# Patient Record
Sex: MALE | Race: WHITE | Employment: OTHER | URBAN - METROPOLITAN AREA
[De-identification: names, ages, dates, MRNs, and addresses within clinical notes are randomized per-mention and may not be internally consistent; named-entity substitution may affect disease eponyms.]

---

## 2017-01-11 PROCEDURE — 81003 URINALYSIS AUTO W/O SCOPE: CPT | Performed by: INTERNAL MEDICINE

## 2017-02-03 PROBLEM — K21.9 GASTROESOPHAGEAL REFLUX DISEASE WITHOUT ESOPHAGITIS: Status: ACTIVE | Noted: 2017-02-03

## 2017-02-03 PROBLEM — M54.9 UPPER BACK PAIN: Status: ACTIVE | Noted: 2017-02-03

## 2017-06-23 PROBLEM — E11.9 CONTROLLED TYPE 2 DIABETES MELLITUS WITHOUT COMPLICATION, WITHOUT LONG-TERM CURRENT USE OF INSULIN (HCC): Status: ACTIVE | Noted: 2017-06-23

## 2017-06-23 PROBLEM — I82.409 RECURRENT DEEP VEIN THROMBOSIS (DVT) (HCC): Status: ACTIVE | Noted: 2017-06-23

## 2017-06-23 PROBLEM — K21.9 GASTROESOPHAGEAL REFLUX DISEASE WITHOUT ESOPHAGITIS: Status: RESOLVED | Noted: 2017-02-03 | Resolved: 2017-06-23

## 2017-10-17 PROBLEM — M75.101 ROTATOR CUFF SYNDROME, RIGHT: Status: ACTIVE | Noted: 2017-10-17

## 2017-10-17 PROBLEM — M54.16 LUMBAR RADICULOPATHY: Status: ACTIVE | Noted: 2017-10-17

## 2017-10-23 PROBLEM — M75.102 ROTATOR CUFF SYNDROME OF BOTH SHOULDERS: Status: ACTIVE | Noted: 2017-10-17

## 2017-10-23 PROBLEM — M75.101 ROTATOR CUFF SYNDROME OF BOTH SHOULDERS: Status: ACTIVE | Noted: 2017-10-17

## 2017-10-24 PROBLEM — M48.062 NEUROGENIC CLAUDICATION DUE TO LUMBAR SPINAL STENOSIS: Status: ACTIVE | Noted: 2017-10-24

## 2017-10-24 PROBLEM — M54.42 CHRONIC BILATERAL LOW BACK PAIN WITH BILATERAL SCIATICA: Status: ACTIVE | Noted: 2017-10-24

## 2017-10-24 PROBLEM — M54.41 CHRONIC BILATERAL LOW BACK PAIN WITH BILATERAL SCIATICA: Status: ACTIVE | Noted: 2017-10-24

## 2017-10-24 PROBLEM — G89.29 CHRONIC BILATERAL LOW BACK PAIN WITH BILATERAL SCIATICA: Status: ACTIVE | Noted: 2017-10-24

## 2018-02-07 RX ORDER — SODIUM CHLORIDE, SODIUM LACTATE, POTASSIUM CHLORIDE, CALCIUM CHLORIDE 600; 310; 30; 20 MG/100ML; MG/100ML; MG/100ML; MG/100ML
INJECTION, SOLUTION INTRAVENOUS CONTINUOUS
Status: CANCELLED | OUTPATIENT
Start: 2018-02-07

## 2018-02-15 PROCEDURE — 81003 URINALYSIS AUTO W/O SCOPE: CPT | Performed by: INTERNAL MEDICINE

## 2018-02-26 PROBLEM — N18.30 TYPE 2 DIABETES MELLITUS WITH STAGE 3 CHRONIC KIDNEY DISEASE, WITHOUT LONG-TERM CURRENT USE OF INSULIN (HCC): Status: ACTIVE | Noted: 2017-06-23

## 2018-02-26 PROBLEM — E11.22 TYPE 2 DIABETES MELLITUS WITH STAGE 3 CHRONIC KIDNEY DISEASE, WITHOUT LONG-TERM CURRENT USE OF INSULIN (HCC): Status: ACTIVE | Noted: 2017-06-23

## 2018-02-26 PROCEDURE — 87081 CULTURE SCREEN ONLY: CPT | Performed by: INTERNAL MEDICINE

## 2018-03-02 ENCOUNTER — ANESTHESIA EVENT (OUTPATIENT)
Dept: SURGERY | Facility: HOSPITAL | Age: 73
DRG: 520 | End: 2018-03-02
Payer: MEDICARE

## 2018-03-15 ENCOUNTER — SURGERY (OUTPATIENT)
Age: 73
End: 2018-03-15

## 2018-03-15 ENCOUNTER — APPOINTMENT (OUTPATIENT)
Dept: GENERAL RADIOLOGY | Facility: HOSPITAL | Age: 73
DRG: 520 | End: 2018-03-15
Attending: ORTHOPAEDIC SURGERY
Payer: MEDICARE

## 2018-03-15 ENCOUNTER — ANESTHESIA (OUTPATIENT)
Dept: SURGERY | Facility: HOSPITAL | Age: 73
DRG: 520 | End: 2018-03-15
Payer: MEDICARE

## 2018-03-15 ENCOUNTER — HOSPITAL ENCOUNTER (INPATIENT)
Facility: HOSPITAL | Age: 73
LOS: 1 days | Discharge: HOME HEALTH | DRG: 520 | End: 2018-03-16
Attending: ORTHOPAEDIC SURGERY | Admitting: ORTHOPAEDIC SURGERY
Payer: MEDICARE

## 2018-03-15 DIAGNOSIS — M51.26 HNP (HERNIATED NUCLEUS PULPOSUS), LUMBAR: ICD-10-CM

## 2018-03-15 DIAGNOSIS — M48.062 SPINAL STENOSIS OF LUMBAR REGION WITH NEUROGENIC CLAUDICATION: ICD-10-CM

## 2018-03-15 LAB
ERYTHROCYTE [DISTWIDTH] IN BLOOD BY AUTOMATED COUNT: 14.3 % (ref 11.5–16)
EST. AVERAGE GLUCOSE BLD GHB EST-MCNC: 105 MG/DL (ref 68–126)
GLUCOSE BLD-MCNC: 125 MG/DL (ref 65–99)
GLUCOSE BLD-MCNC: 125 MG/DL (ref 65–99)
GLUCOSE BLD-MCNC: 162 MG/DL (ref 65–99)
GLUCOSE BLD-MCNC: 293 MG/DL (ref 65–99)
HBA1C MFR BLD HPLC: 5.3 % (ref ?–5.7)
HCT VFR BLD AUTO: 37.3 % (ref 37–53)
HGB BLD-MCNC: 13.5 G/DL (ref 13–17)
MCH RBC QN AUTO: 33.3 PG (ref 27–33.2)
MCHC RBC AUTO-ENTMCNC: 36.2 G/DL (ref 31–37)
MCV RBC AUTO: 92.1 FL (ref 80–99)
PLATELET # BLD AUTO: 80 10(3)UL (ref 150–450)
RBC # BLD AUTO: 4.05 X10(6)UL (ref 3.8–5.8)
RED CELL DISTRIBUTION WIDTH-SD: 48.3 FL (ref 35.1–46.3)
WBC # BLD AUTO: 5.6 X10(3) UL (ref 4–13)

## 2018-03-15 PROCEDURE — 82962 GLUCOSE BLOOD TEST: CPT

## 2018-03-15 PROCEDURE — 01NB0ZZ RELEASE LUMBAR NERVE, OPEN APPROACH: ICD-10-PCS | Performed by: ORTHOPAEDIC SURGERY

## 2018-03-15 PROCEDURE — 88304 TISSUE EXAM BY PATHOLOGIST: CPT | Performed by: ORTHOPAEDIC SURGERY

## 2018-03-15 PROCEDURE — 88311 DECALCIFY TISSUE: CPT | Performed by: ORTHOPAEDIC SURGERY

## 2018-03-15 PROCEDURE — 72020 X-RAY EXAM OF SPINE 1 VIEW: CPT | Performed by: ORTHOPAEDIC SURGERY

## 2018-03-15 PROCEDURE — 85027 COMPLETE CBC AUTOMATED: CPT | Performed by: PHYSICIAN ASSISTANT

## 2018-03-15 PROCEDURE — 00BT0ZZ EXCISION OF SPINAL MENINGES, OPEN APPROACH: ICD-10-PCS | Performed by: ORTHOPAEDIC SURGERY

## 2018-03-15 PROCEDURE — 83036 HEMOGLOBIN GLYCOSYLATED A1C: CPT | Performed by: HOSPITALIST

## 2018-03-15 PROCEDURE — 51701 INSERT BLADDER CATHETER: CPT

## 2018-03-15 PROCEDURE — 0SU00KZ SUPPLEMENT LUMBAR VERTEBRAL JOINT WITH NONAUTOLOGOUS TISSUE SUBSTITUTE, OPEN APPROACH: ICD-10-PCS | Performed by: ORTHOPAEDIC SURGERY

## 2018-03-15 DEVICE — DURAGENXS MATRIX DURAL REGENERATION MATRIX IS AN ABSORBABLE IMPLANT FOR REPAIR OF DURAL DEFECTS. DURAGENXS MATRIX IS AN EASY TO HANDLE, SOFT, WHITE, PLIABLE, NONFRIABLE, POROUS COLLAGEN MATRIX. DURAGENXS MATRIX IS SUPPLIED STERILE, NONPYROGENIC, FOR SINGLE USE IN DOUBLE PEEL PACKAGES IN A VARIETY OF SIZES.
Type: IMPLANTABLE DEVICE | Site: BACK | Status: FUNCTIONAL
Brand: DURAGEN XS™ DURAL REGENERATION MATRIX

## 2018-03-15 RX ORDER — DIPHENHYDRAMINE HCL 25 MG
25 CAPSULE ORAL EVERY 4 HOURS PRN
Status: DISCONTINUED | OUTPATIENT
Start: 2018-03-15 | End: 2018-03-16

## 2018-03-15 RX ORDER — DEXAMETHASONE SODIUM PHOSPHATE 4 MG/ML
4 VIAL (ML) INJECTION AS NEEDED
Status: DISCONTINUED | OUTPATIENT
Start: 2018-03-15 | End: 2018-03-15 | Stop reason: HOSPADM

## 2018-03-15 RX ORDER — ASPIRIN 81 MG/1
81 TABLET ORAL DAILY
COMMUNITY
End: 2018-03-16

## 2018-03-15 RX ORDER — ACETAMINOPHEN 500 MG
1000 TABLET ORAL ONCE
COMMUNITY
End: 2018-05-07 | Stop reason: CLARIF

## 2018-03-15 RX ORDER — DEXTROSE MONOHYDRATE 25 G/50ML
50 INJECTION, SOLUTION INTRAVENOUS
Status: DISCONTINUED | OUTPATIENT
Start: 2018-03-15 | End: 2018-03-15 | Stop reason: HOSPADM

## 2018-03-15 RX ORDER — ONDANSETRON 2 MG/ML
4 INJECTION INTRAMUSCULAR; INTRAVENOUS AS NEEDED
Status: DISCONTINUED | OUTPATIENT
Start: 2018-03-15 | End: 2018-03-15 | Stop reason: HOSPADM

## 2018-03-15 RX ORDER — LOSARTAN POTASSIUM AND HYDROCHLOROTHIAZIDE 12.5; 1 MG/1; MG/1
1 TABLET ORAL DAILY
COMMUNITY
End: 2019-01-23

## 2018-03-15 RX ORDER — CEFAZOLIN SODIUM 1 G/3ML
INJECTION, POWDER, FOR SOLUTION INTRAMUSCULAR; INTRAVENOUS
Status: DISCONTINUED | OUTPATIENT
Start: 2018-03-15 | End: 2018-03-15

## 2018-03-15 RX ORDER — ALLOPURINOL 100 MG/1
100 TABLET ORAL
Status: DISCONTINUED | OUTPATIENT
Start: 2018-03-15 | End: 2018-03-16

## 2018-03-15 RX ORDER — TIZANIDINE 4 MG/1
4 TABLET ORAL EVERY 6 HOURS PRN
Status: DISCONTINUED | OUTPATIENT
Start: 2018-03-15 | End: 2018-03-16

## 2018-03-15 RX ORDER — CEFAZOLIN SODIUM/WATER 2 G/20 ML
SYRINGE (ML) INTRAVENOUS
Status: DISPENSED
Start: 2018-03-15 | End: 2018-03-15

## 2018-03-15 RX ORDER — GABAPENTIN 600 MG/1
600 TABLET ORAL ONCE
Status: COMPLETED | OUTPATIENT
Start: 2018-03-15 | End: 2018-03-15

## 2018-03-15 RX ORDER — SODIUM CHLORIDE, SODIUM LACTATE, POTASSIUM CHLORIDE, CALCIUM CHLORIDE 600; 310; 30; 20 MG/100ML; MG/100ML; MG/100ML; MG/100ML
INJECTION, SOLUTION INTRAVENOUS CONTINUOUS
Status: DISCONTINUED | OUTPATIENT
Start: 2018-03-15 | End: 2018-03-15 | Stop reason: HOSPADM

## 2018-03-15 RX ORDER — LOSARTAN POTASSIUM 100 MG/1
100 TABLET ORAL DAILY
Status: DISCONTINUED | OUTPATIENT
Start: 2018-03-15 | End: 2018-03-16

## 2018-03-15 RX ORDER — HYDROCODONE BITARTRATE AND ACETAMINOPHEN 5; 325 MG/1; MG/1
2 TABLET ORAL AS NEEDED
Status: DISCONTINUED | OUTPATIENT
Start: 2018-03-15 | End: 2018-03-15 | Stop reason: HOSPADM

## 2018-03-15 RX ORDER — METOCLOPRAMIDE HYDROCHLORIDE 5 MG/ML
10 INJECTION INTRAMUSCULAR; INTRAVENOUS EVERY 6 HOURS PRN
Status: DISCONTINUED | OUTPATIENT
Start: 2018-03-15 | End: 2018-03-16

## 2018-03-15 RX ORDER — ONDANSETRON 2 MG/ML
4 INJECTION INTRAMUSCULAR; INTRAVENOUS EVERY 4 HOURS PRN
Status: ACTIVE | OUTPATIENT
Start: 2018-03-15 | End: 2018-03-16

## 2018-03-15 RX ORDER — CELECOXIB 200 MG/1
CAPSULE ORAL
Status: COMPLETED
Start: 2018-03-15 | End: 2018-03-15

## 2018-03-15 RX ORDER — BISACODYL 10 MG
10 SUPPOSITORY, RECTAL RECTAL
Status: DISCONTINUED | OUTPATIENT
Start: 2018-03-15 | End: 2018-03-16

## 2018-03-15 RX ORDER — HYDROCODONE BITARTRATE AND ACETAMINOPHEN 5; 325 MG/1; MG/1
1 TABLET ORAL AS NEEDED
Status: DISCONTINUED | OUTPATIENT
Start: 2018-03-15 | End: 2018-03-15 | Stop reason: HOSPADM

## 2018-03-15 RX ORDER — GABAPENTIN 600 MG/1
TABLET ORAL
Status: COMPLETED
Start: 2018-03-15 | End: 2018-03-15

## 2018-03-15 RX ORDER — MORPHINE SULFATE 4 MG/ML
2 INJECTION, SOLUTION INTRAMUSCULAR; INTRAVENOUS EVERY 2 HOUR PRN
Status: DISCONTINUED | OUTPATIENT
Start: 2018-03-15 | End: 2018-03-16

## 2018-03-15 RX ORDER — SODIUM CHLORIDE 9 MG/ML
INJECTION, SOLUTION INTRAVENOUS CONTINUOUS
Status: DISCONTINUED | OUTPATIENT
Start: 2018-03-15 | End: 2018-03-16

## 2018-03-15 RX ORDER — LABETALOL HYDROCHLORIDE 5 MG/ML
5 INJECTION, SOLUTION INTRAVENOUS EVERY 5 MIN PRN
Status: DISCONTINUED | OUTPATIENT
Start: 2018-03-15 | End: 2018-03-15 | Stop reason: HOSPADM

## 2018-03-15 RX ORDER — ONDANSETRON 2 MG/ML
4 INJECTION INTRAMUSCULAR; INTRAVENOUS ONCE
Status: COMPLETED | OUTPATIENT
Start: 2018-03-15 | End: 2018-03-15

## 2018-03-15 RX ORDER — ONDANSETRON 2 MG/ML
INJECTION INTRAMUSCULAR; INTRAVENOUS
Status: COMPLETED
Start: 2018-03-15 | End: 2018-03-15

## 2018-03-15 RX ORDER — CEFAZOLIN SODIUM/WATER 2 G/20 ML
2 SYRINGE (ML) INTRAVENOUS EVERY 8 HOURS
Status: COMPLETED | OUTPATIENT
Start: 2018-03-15 | End: 2018-03-16

## 2018-03-15 RX ORDER — INSULIN ASPART 100 [IU]/ML
INJECTION, SOLUTION INTRAVENOUS; SUBCUTANEOUS ONCE
Status: DISCONTINUED | OUTPATIENT
Start: 2018-03-15 | End: 2018-03-15 | Stop reason: HOSPADM

## 2018-03-15 RX ORDER — ACETAMINOPHEN 500 MG
1000 TABLET ORAL ONCE AS NEEDED
Status: DISCONTINUED | OUTPATIENT
Start: 2018-03-15 | End: 2018-03-15 | Stop reason: HOSPADM

## 2018-03-15 RX ORDER — SODIUM CHLORIDE, SODIUM LACTATE, POTASSIUM CHLORIDE, CALCIUM CHLORIDE 600; 310; 30; 20 MG/100ML; MG/100ML; MG/100ML; MG/100ML
INJECTION, SOLUTION INTRAVENOUS CONTINUOUS
Status: DISCONTINUED | OUTPATIENT
Start: 2018-03-15 | End: 2018-03-16

## 2018-03-15 RX ORDER — AMLODIPINE BESYLATE 5 MG/1
5 TABLET ORAL DAILY
Status: DISCONTINUED | OUTPATIENT
Start: 2018-03-15 | End: 2018-03-16

## 2018-03-15 RX ORDER — GLIMEPIRIDE 2 MG/1
2 TABLET ORAL 2 TIMES DAILY
COMMUNITY
End: 2018-12-04

## 2018-03-15 RX ORDER — CEFAZOLIN SODIUM/WATER 2 G/20 ML
2 SYRINGE (ML) INTRAVENOUS ONCE
Status: DISCONTINUED | OUTPATIENT
Start: 2018-03-15 | End: 2018-03-15 | Stop reason: HOSPADM

## 2018-03-15 RX ORDER — DOCUSATE SODIUM 100 MG/1
100 CAPSULE, LIQUID FILLED ORAL 2 TIMES DAILY
Status: DISCONTINUED | OUTPATIENT
Start: 2018-03-15 | End: 2018-03-16

## 2018-03-15 RX ORDER — BUPIVACAINE HYDROCHLORIDE AND EPINEPHRINE 5; 5 MG/ML; UG/ML
INJECTION, SOLUTION EPIDURAL; INTRACAUDAL; PERINEURAL AS NEEDED
Status: DISCONTINUED | OUTPATIENT
Start: 2018-03-15 | End: 2018-03-15

## 2018-03-15 RX ORDER — DEXTROSE MONOHYDRATE 25 G/50ML
50 INJECTION, SOLUTION INTRAVENOUS
Status: DISCONTINUED | OUTPATIENT
Start: 2018-03-15 | End: 2018-03-16

## 2018-03-15 RX ORDER — MORPHINE SULFATE 2 MG/ML
2 INJECTION, SOLUTION INTRAMUSCULAR; INTRAVENOUS EVERY 5 MIN PRN
Status: DISCONTINUED | OUTPATIENT
Start: 2018-03-15 | End: 2018-03-15 | Stop reason: HOSPADM

## 2018-03-15 RX ORDER — SODIUM PHOSPHATE, DIBASIC AND SODIUM PHOSPHATE, MONOBASIC 7; 19 G/133ML; G/133ML
1 ENEMA RECTAL ONCE AS NEEDED
Status: DISCONTINUED | OUTPATIENT
Start: 2018-03-15 | End: 2018-03-16

## 2018-03-15 RX ORDER — MIDAZOLAM HYDROCHLORIDE 1 MG/ML
1 INJECTION INTRAMUSCULAR; INTRAVENOUS EVERY 5 MIN PRN
Status: DISCONTINUED | OUTPATIENT
Start: 2018-03-15 | End: 2018-03-15 | Stop reason: HOSPADM

## 2018-03-15 RX ORDER — NALOXONE HYDROCHLORIDE 0.4 MG/ML
80 INJECTION, SOLUTION INTRAMUSCULAR; INTRAVENOUS; SUBCUTANEOUS AS NEEDED
Status: DISCONTINUED | OUTPATIENT
Start: 2018-03-15 | End: 2018-03-15 | Stop reason: HOSPADM

## 2018-03-15 RX ORDER — MORPHINE SULFATE 15 MG/1
15 TABLET ORAL EVERY 4 HOURS PRN
Status: DISCONTINUED | OUTPATIENT
Start: 2018-03-15 | End: 2018-03-16

## 2018-03-15 RX ORDER — METOCLOPRAMIDE HYDROCHLORIDE 5 MG/ML
10 INJECTION INTRAMUSCULAR; INTRAVENOUS AS NEEDED
Status: DISCONTINUED | OUTPATIENT
Start: 2018-03-15 | End: 2018-03-15 | Stop reason: HOSPADM

## 2018-03-15 RX ORDER — DIPHENHYDRAMINE HYDROCHLORIDE 50 MG/ML
25 INJECTION INTRAMUSCULAR; INTRAVENOUS EVERY 4 HOURS PRN
Status: DISCONTINUED | OUTPATIENT
Start: 2018-03-15 | End: 2018-03-16

## 2018-03-15 RX ORDER — MEPERIDINE HYDROCHLORIDE 25 MG/ML
12.5 INJECTION INTRAMUSCULAR; INTRAVENOUS; SUBCUTANEOUS AS NEEDED
Status: DISCONTINUED | OUTPATIENT
Start: 2018-03-15 | End: 2018-03-15 | Stop reason: HOSPADM

## 2018-03-15 RX ORDER — POLYETHYLENE GLYCOL 3350 17 G/17G
17 POWDER, FOR SOLUTION ORAL DAILY PRN
Status: DISCONTINUED | OUTPATIENT
Start: 2018-03-15 | End: 2018-03-16

## 2018-03-15 RX ORDER — CELECOXIB 200 MG/1
200 CAPSULE ORAL ONCE
Status: COMPLETED | OUTPATIENT
Start: 2018-03-15 | End: 2018-03-15

## 2018-03-15 RX ORDER — OXYCODONE HYDROCHLORIDE 10 MG/1
10 TABLET ORAL EVERY 4 HOURS PRN
Status: DISCONTINUED | OUTPATIENT
Start: 2018-03-15 | End: 2018-03-16

## 2018-03-15 RX ORDER — MORPHINE SULFATE 4 MG/ML
4 INJECTION, SOLUTION INTRAMUSCULAR; INTRAVENOUS EVERY 2 HOUR PRN
Status: DISCONTINUED | OUTPATIENT
Start: 2018-03-15 | End: 2018-03-16

## 2018-03-15 RX ORDER — BACITRACIN 50000 [USP'U]/1
INJECTION, POWDER, LYOPHILIZED, FOR SOLUTION INTRAMUSCULAR AS NEEDED
Status: DISCONTINUED | OUTPATIENT
Start: 2018-03-15 | End: 2018-03-15

## 2018-03-15 RX ORDER — MORPHINE SULFATE 4 MG/ML
1 INJECTION, SOLUTION INTRAMUSCULAR; INTRAVENOUS EVERY 2 HOUR PRN
Status: DISCONTINUED | OUTPATIENT
Start: 2018-03-15 | End: 2018-03-16

## 2018-03-15 RX ORDER — OXYCODONE HYDROCHLORIDE 5 MG/1
5 TABLET ORAL EVERY 4 HOURS PRN
Status: DISCONTINUED | OUTPATIENT
Start: 2018-03-15 | End: 2018-03-16

## 2018-03-15 NOTE — ANESTHESIA POSTPROCEDURE EVALUATION
[address] [facility],[address] Patient Status:  Inpatient   Age/Gender 71year old male MRN [id number]   [facility] 3SW-A Attending Madisyn Martin MD   Hosp Day # 0 PCP Louie Crum MD       Anesthesia Post-op Note    Procedure(s):  lum

## 2018-03-15 NOTE — BRIEF OP NOTE
Pre-Operative Diagnosis: Spinal stenosis of lumbar region with neurogenic claudication [M48.062]  HNP (herniated nucleus pulposus), lumbar [M51.26]     Post-Operative Diagnosis: Spinal stenosis of lumbar region with neurogenic claudication [M48.062]HNP

## 2018-03-15 NOTE — PLAN OF CARE
Pt dressing leaking on L side. Reinforced dressing with foam tape twice. Paged Pool SALMERON, awaiting response. Will monitor. 1808: Discussed with Holly SALMERON.  If dressing leaks for 3rd time, change dressing and replace with 4x4s, abd pad, and foam t

## 2018-03-15 NOTE — PAYOR COMM NOTE
--------------  Order Requisition   Admit to inpatient Once (Order #[id number]) on 3/15/18        ADMISSION REVIEW     Payor: Hebert Stephen [place] #:  [id number]  Authorization Number: N/A    Admit date: 3/15/18  Admit time: 1014       Dire MD Shaheen      CeFAZolin Sodium (ANCEF) 1 g injection     Date Action Dose Route User    3/15/2018 0745 Given 2 g Intravenous eLslie Shanks MD      celecoxib (CeleBREX) cap 200 mg     Date Action Dose Route User    3/15/2018 7836 Given 200 mg Oral Labak

## 2018-03-15 NOTE — H&P
Uyen Bauer is a 71year old male. Patient presents with:  Pre-Op Exam: back surgery on 3/15/18 @ [facility] by Dr. Lisa Toledo      HPI:  He hAs had low back pain and will have surgery by Lisa Toledo. No hx of heart disease and had a stress test last year.   He ORDER FOR IV ANT*      Comment: Procedure: ESOPHAGOGASTRODUODENOSCOPY,                COLONOSCOPY, POSSIBLE BIOPSY, POSSIBLE                POLYPECTOMY 21845,84605;  Surgeon: Walter Silva MD;  Location: [address] [place] [address] date: T Rfl:       ROS:  GENERAL HEALTH: otherwise feels well  SKIN: denies any unusual skin lesions or rashes  EYES: no visual complaints or deficits  HEENT: denies nasal congestion or sore throat   RESPIRATORY: denies shortness of breath, wheezing or cough   CAR above referenced H&P was reviewed by Soledad Best MD on 3/15/2018, and no significant changes have occurred in the patient's condition since the H&P was performed. Risks and benefits discussed in detail.   Risks including but not limited to bleeding

## 2018-03-15 NOTE — ANESTHESIA PREPROCEDURE EVALUATION
PRE-OP EVALUATION    Patient Name: Karena Kennedy    Pre-op Diagnosis: Spinal stenosis of lumbar region with neurogenic claudication [M48.062]  HNP (herniated nucleus pulposus), lumbar [M51.26]    Procedure(s):  lumbar 3- lumbar 4, lumbar 4- lumbar 5 decompr daily.   Disp:  Rfl:    allopurinol 100 MG Oral Tab Take 1 tablet (100 mg total) by mouth once daily. Disp: 90 tablet Rfl: 3   Probiotic Product (ALIGN) 4 MG Oral Cap Take  by mouth. Disp:  Rfl:        Allergies: Patient has no known allergies.       Anesth POSSIBLE                POLYPECTOMY 37511,55677;  Surgeon: Amira Madrid MD;  Location: [address] [place] [address]  No date: TONSILLECTOMY  3/8/13= Gastritis: UPPER GI ENDOSCOPY PERFORMED      Comment: Normal gastric/duodenal Bx  3/5/2013: UP

## 2018-03-15 NOTE — PROGRESS NOTES
S: Minimal back pain with no leg pain. Patient has no numbness. No weakness. No headaches. No other concerns. Inspection:  Awake alert No acute distress.  No difficulty breathing     Blood pressure 150/77, pulse 80, temperature 97.6 °F (36.4 °C), t

## 2018-03-15 NOTE — PLAN OF CARE
Pt arrived from PACU on bed. A/Ox4. VSS. Denies pain or nausea. Due to void. Oriented to room and call light system. Educated on fall precautions. Paged Dr. Trent Foster for new consult. Will monitor. 1105: Dr. Trent Foster will see pt.

## 2018-03-15 NOTE — PLAN OF CARE
Pt reports that it feels like he is not emptying bladder completely. Bladder scan completed = 740cc. Straight cath'd per protocol = 750cc out. Will monitor.

## 2018-03-15 NOTE — CONSULTS
[facility] hospitalist initial consult note  PCP;Naldo Torre MD  Consulted at the request of Dr. Kay Ruth  Reason for consult medical co-management  HPi 72 yo male with hx of HTn, HL, DM2, gastritis, Spinal stenosis of lumbar region with neurogenic claudication Adria Fabry, MD;  Location: [address] [place] [address]  3/5/2013: [facility] PREOPERATIVE ORDER FOR IV ANT*      Comment: Procedure: ESOPHAGOGASTRODUODENOSCOPY,                COLONOSCOPY, POSSIBLE BIOPSY, POSSIBLE                POLYPECTOMY 4 POTASSIUM-HCTZ 100-12.5 MG Oral Tab TAKE 1 TABLET BY MOUTH  DAILY IN THE MORNING Disp: 90 tablet Rfl: 3   ACCU-CHEK FASTCLIX LANCETS Does not apply Misc Test once daily Disp: 100 each Rfl: 4   ACCU-CHEK SMARTVIEW In Vitro Strip Test once daily Disp: [address] st

## 2018-03-16 VITALS
TEMPERATURE: 98 F | SYSTOLIC BLOOD PRESSURE: 123 MMHG | RESPIRATION RATE: 20 BRPM | WEIGHT: 203 LBS | HEART RATE: 80 BPM | HEIGHT: 70 IN | OXYGEN SATURATION: 98 % | BODY MASS INDEX: 29.06 KG/M2 | DIASTOLIC BLOOD PRESSURE: 77 MMHG

## 2018-03-16 LAB
BASOPHILS # BLD AUTO: 0.01 X10(3) UL (ref 0–0.1)
BASOPHILS NFR BLD AUTO: 0.1 %
BUN BLD-MCNC: 14 MG/DL (ref 8–20)
CALCIUM BLD-MCNC: 8.9 MG/DL (ref 8.3–10.3)
CHLORIDE: 102 MMOL/L (ref 101–111)
CO2: 29 MMOL/L (ref 22–32)
CREAT BLD-MCNC: 1.35 MG/DL (ref 0.7–1.3)
EOSINOPHIL # BLD AUTO: 0.01 X10(3) UL (ref 0–0.3)
EOSINOPHIL NFR BLD AUTO: 0.1 %
ERYTHROCYTE [DISTWIDTH] IN BLOOD BY AUTOMATED COUNT: 14.3 % (ref 11.5–16)
GLUCOSE BLD-MCNC: 118 MG/DL (ref 65–99)
GLUCOSE BLD-MCNC: 119 MG/DL (ref 70–99)
GLUCOSE BLD-MCNC: 214 MG/DL (ref 65–99)
HCT VFR BLD AUTO: 36.4 % (ref 37–53)
HGB BLD-MCNC: 12.7 G/DL (ref 13–17)
IMMATURE GRANULOCYTE COUNT: 0.05 X10(3) UL (ref 0–1)
IMMATURE GRANULOCYTE RATIO %: 0.7 %
LYMPHOCYTES # BLD AUTO: 1.02 X10(3) UL (ref 0.9–4)
LYMPHOCYTES NFR BLD AUTO: 14.4 %
MCH RBC QN AUTO: 32.5 PG (ref 27–33.2)
MCHC RBC AUTO-ENTMCNC: 34.9 G/DL (ref 31–37)
MCV RBC AUTO: 93.1 FL (ref 80–99)
MONOCYTES # BLD AUTO: 0.66 X10(3) UL (ref 0.1–1)
MONOCYTES NFR BLD AUTO: 9.3 %
NEUTROPHIL ABS PRELIM: 5.31 X10 (3) UL (ref 1.3–6.7)
NEUTROPHILS # BLD AUTO: 5.31 X10(3) UL (ref 1.3–6.7)
NEUTROPHILS NFR BLD AUTO: 75.4 %
PLATELET # BLD AUTO: 92 10(3)UL (ref 150–450)
POTASSIUM SERPL-SCNC: 4.5 MMOL/L (ref 3.6–5.1)
RBC # BLD AUTO: 3.91 X10(6)UL (ref 3.8–5.8)
RED CELL DISTRIBUTION WIDTH-SD: 49 FL (ref 35.1–46.3)
SODIUM SERPL-SCNC: 137 MMOL/L (ref 136–144)
WBC # BLD AUTO: 7.1 X10(3) UL (ref 4–13)

## 2018-03-16 PROCEDURE — 85025 COMPLETE CBC W/AUTO DIFF WBC: CPT | Performed by: HOSPITALIST

## 2018-03-16 PROCEDURE — 97161 PT EVAL LOW COMPLEX 20 MIN: CPT

## 2018-03-16 PROCEDURE — 82962 GLUCOSE BLOOD TEST: CPT

## 2018-03-16 PROCEDURE — 80048 BASIC METABOLIC PNL TOTAL CA: CPT | Performed by: HOSPITALIST

## 2018-03-16 PROCEDURE — 97165 OT EVAL LOW COMPLEX 30 MIN: CPT

## 2018-03-16 PROCEDURE — 97530 THERAPEUTIC ACTIVITIES: CPT

## 2018-03-16 PROCEDURE — 97535 SELF CARE MNGMENT TRAINING: CPT

## 2018-03-16 RX ORDER — HYDROCODONE BITARTRATE AND ACETAMINOPHEN 10; 325 MG/1; MG/1
1 TABLET ORAL EVERY 6 HOURS PRN
Status: DISCONTINUED | OUTPATIENT
Start: 2018-03-16 | End: 2018-03-16

## 2018-03-16 NOTE — HOME CARE LIAISON
MET WITH PTNT TO DISCUSS HOME HEALTH SERVICES AND COVERAGE CRITERIA. PTNT AGREEABLE TO Andreas Espana. PTNT GIVEN RESIDENTIAL BROCHURE. RESIDENTIAL WITH PROVIDE SN/PT ON DISCHARGE.     Thank you for this referral,   Galindo Weaver

## 2018-03-16 NOTE — PLAN OF CARE
Pt reports that home health care will not be there to change dressing until Sunday- Per Kennedy SALMERON- every other day dressing changes should be done by home health care. Will change dressing prior to DC.    1615: Old dressing removed.  Still small amount of lakshmi

## 2018-03-16 NOTE — OCCUPATIONAL THERAPY NOTE
OCCUPATIONAL THERAPY QUICK EVALUATION - INPATIENT    Room Number: 380/380-A  Evaluation Date: 3/16/2018     Type of Evaluation: Quick Eval  Presenting Problem: s/p L3-L5 decompression fusion 3/15/18    Physician Order: IP Consult to Occupational Therapy  R DOCUMENTED NOT TO HAVE EXPERIENCED ANY*      Comment: Procedure: ESOPHAGOGASTRODUODENOSCOPY,                COLONOSCOPY, POSSIBLE BIOPSY, POSSIBLE                POLYPECTOMY 32152,61533;  Surgeon: Gloria Mcbride MD;  Location: [facility] techniques;Relaxation;Repositioning    COGNITION  WFL    RANGE OF MOTION AND STRENGTH ASSESSMENT  Upper extremity ROM is within functional limits     Upper extremity strength is within functional limits     NEUROLOGICAL FINDINGS  Neurological Findings: Non concerns addressed; Ice applied; Other (Comment) (refusing SCDs)    ASSESSMENT   Patient is a 71year old male admitted 3/15/2018 for L3-L5 decompression fusion 3/15/18. Patient seen this am for OT evauation.  In this OT eval patient performed all ADL tasks,

## 2018-03-16 NOTE — PHYSICAL THERAPY NOTE
PHYSICAL THERAPY QUICK EVALUATION - INPATIENT    Room Number: 380/380-A  Evaluation Date: 3/16/2018  Presenting Problem: S/p L3-L4,L4-L5 Decompression, Uninstrumented  Physician Order: PT Eval and Treat    Problem List  Active Problems:    HNP (herniated COLONOSCOPY, POSSIBLE BIOPSY, POSSIBLE                POLYPECTOMY 38162,78380;  Surgeon: Donya Edgar MD;  Location: [address] [place] [address]  No date: TONSILLECTOMY  3/8/13= Gastritis: UPPER GI ENDOSCOPY PERFORMED      Comment: Normal g left with end range limitation of ROM.     Lower extremity ROM is within functional limits     Lower extremity strength is within functional limits     NEUROLOGICAL FINDINGS  Neurological Findings: None                   AM-PAC '6-Clicks' INPATIENT SHORT FO year old male admitted on 3/15/2018 for S/p L3-L4,L4-L5 Decompression, Uninstrumented. Pertinent comorbidities and personal factors impacting therapy include H/o Right RC syndrome, HTN, DM type II,Gout.   Functional outcome measures completed include DEREK SALMERON

## 2018-03-16 NOTE — CERTIFICATION
**Certification    PHYSICIAN Certification of Need for Inpatient Hospitalization    Based on the his current state of illness, Opal Bradshaw requires inpatient hospitalization for his surgery.  Please see EPIC

## 2018-03-16 NOTE — PROGRESS NOTES
S: He has controlled back pain no leg pain. He had his dressing re-enforced    Inspection:  Awake alert No acute distress. No difficulty breathing     Blood pressure 136/73, pulse 70, temperature 98.6 °F (37 °C), resp.  rate 20, height 5' 10\" (1.778 m), w

## 2018-03-16 NOTE — PLAN OF CARE
Pt informs RN that he will be using [facility] for dressing changes and hiring [facility] staff to help change dressings on the alternate days. First visit will still be on Sunday but pt should be able to have daily dressing changes from then on.     Pt discharg

## 2018-03-16 NOTE — CM/SW NOTE
03/16/18 1600   CM/SW Referral Data   Referral Source Physician   Reason for Referral Discharge planning   Informant Patient;Edward Staff   Pertinent Medical Hx   Primary Care Physician Name KATHERIN Torre MD   Patient Info   Patient's Mental Status Aler

## 2018-03-16 NOTE — PLAN OF CARE
Diabetes/Glucose Control    • Glucose maintained within prescribed range Adequate for Discharge        Impaired Activities of Daily Living    • Achieve highest/safest level of independence in self care Adequate for Discharge        PAIN - ADULT    • Verbal

## 2018-03-16 NOTE — PROGRESS NOTES
[facility] hospitalist daily note  Patient was seen/examined on 3/16/18    S; no chest pain, no SOB, no abd pain, no nausea, patient feels ready for discharge.  I touched base with social  Work and they are coming to speak with the patient regarding health service

## 2018-03-19 NOTE — CM/SW NOTE
03/19/18 0800   Discharge disposition   Discharged to: Home-Health   Name of Facillity/Home Care/Hospice Residential   Discharge transportation Private car   3/16/18

## 2018-03-22 NOTE — OPERATIVE REPORT
[facility]    PATIENT'S NAME: Abhay Roldan   ATTENDING PHYSICIAN: Jenny Reynolds M.D. OPERATING PHYSICIAN: Jenny Reynolds M.D.    PATIENT ACCOUNT#:   [de-identified]    LOCATION:  [address] [place] [address]  MEDICAL RECORD #:   [id number]       DATE OF BIRTH:  [birth year] level of the fascia. Subperiosteal dissection was taken at the level of the facet joints but keeping the facet capsules entirely intact. Self-retaining retractors were applied.     We first began at the proximal level of our decompression and removed port sharp curettes, Kerrisons, and a pituitary. It was sent to Pathology for analysis. At the conclusion of the decompression, all areas were free of neurologic compression.     Attention then turned to the L4-L5 level where the cranial and caudal nerve roots irrigated away. Valsalva maneuver confirmed that there was no spinal leak. Marea Roland was applied, subfascial drain was applied.   The fascia was closed in water-tight fashion with figure-of-eight 1-0 Vicryl; 2-0 Vicryl was used for the subcutaneous tissues,

## 2018-04-23 PROBLEM — Z98.890 S/P LUMBAR LAMINECTOMY: Status: ACTIVE | Noted: 2018-04-23

## 2018-04-23 PROBLEM — Z98.1 S/P LUMBAR FUSION: Status: ACTIVE | Noted: 2018-04-23

## 2018-04-23 PROBLEM — M48.02 SPINAL STENOSIS IN CERVICAL REGION: Status: ACTIVE | Noted: 2018-04-23

## 2018-04-23 PROBLEM — M54.12 BRACHIAL (CERVICAL) NEURITIS: Status: ACTIVE | Noted: 2018-04-23

## 2018-05-07 RX ORDER — PREDNISONE 20 MG/1
20 TABLET ORAL NIGHTLY PRN
Status: ON HOLD | COMMUNITY
End: 2018-05-29

## 2018-05-12 PROCEDURE — 81003 URINALYSIS AUTO W/O SCOPE: CPT | Performed by: INTERNAL MEDICINE

## 2018-05-16 PROCEDURE — 87081 CULTURE SCREEN ONLY: CPT | Performed by: INTERNAL MEDICINE

## 2018-05-22 ENCOUNTER — APPOINTMENT (OUTPATIENT)
Dept: LAB | Facility: HOSPITAL | Age: 73
DRG: 472 | End: 2018-05-22
Payer: MEDICARE

## 2018-05-22 DIAGNOSIS — M48.02 SPINAL STENOSIS IN CERVICAL REGION: ICD-10-CM

## 2018-05-22 PROCEDURE — 93010 ELECTROCARDIOGRAM REPORT: CPT | Performed by: INTERNAL MEDICINE

## 2018-05-22 PROCEDURE — 93005 ELECTROCARDIOGRAM TRACING: CPT

## 2018-05-23 ENCOUNTER — ANESTHESIA EVENT (OUTPATIENT)
Dept: SURGERY | Facility: HOSPITAL | Age: 73
DRG: 472 | End: 2018-05-23
Payer: MEDICARE

## 2018-05-24 ENCOUNTER — HOSPITAL ENCOUNTER (INPATIENT)
Facility: HOSPITAL | Age: 73
LOS: 1 days | Discharge: HOME | DRG: 472 | End: 2018-05-25
Attending: ORTHOPAEDIC SURGERY | Admitting: ORTHOPAEDIC SURGERY
Payer: MEDICARE

## 2018-05-24 ENCOUNTER — ANESTHESIA (OUTPATIENT)
Dept: SURGERY | Facility: HOSPITAL | Age: 73
DRG: 472 | End: 2018-05-24
Payer: MEDICARE

## 2018-05-24 ENCOUNTER — APPOINTMENT (OUTPATIENT)
Dept: GENERAL RADIOLOGY | Facility: HOSPITAL | Age: 73
DRG: 472 | End: 2018-05-24
Attending: ORTHOPAEDIC SURGERY
Payer: MEDICARE

## 2018-05-24 ENCOUNTER — SURGERY (OUTPATIENT)
Age: 73
End: 2018-05-24

## 2018-05-24 DIAGNOSIS — M48.02 SPINAL STENOSIS IN CERVICAL REGION: Primary | ICD-10-CM

## 2018-05-24 DIAGNOSIS — M48.02 CERVICAL SPINAL STENOSIS: ICD-10-CM

## 2018-05-24 PROCEDURE — 4A11X4G MONITORING OF PERIPHERAL NERVOUS ELECTRICAL ACTIVITY, INTRAOPERATIVE, EXTERNAL APPROACH: ICD-10-PCS | Performed by: ORTHOPAEDIC SURGERY

## 2018-05-24 PROCEDURE — 95940 IONM IN OPERATNG ROOM 15 MIN: CPT | Performed by: ORTHOPAEDIC SURGERY

## 2018-05-24 PROCEDURE — 0RG20A0 FUSION OF 2 OR MORE CERVICAL VERTEBRAL JOINTS WITH INTERBODY FUSION DEVICE, ANTERIOR APPROACH, ANTERIOR COLUMN, OPEN APPROACH: ICD-10-PCS | Performed by: ORTHOPAEDIC SURGERY

## 2018-05-24 PROCEDURE — 82962 GLUCOSE BLOOD TEST: CPT

## 2018-05-24 PROCEDURE — 83036 HEMOGLOBIN GLYCOSYLATED A1C: CPT | Performed by: INTERNAL MEDICINE

## 2018-05-24 PROCEDURE — 95939 C MOTOR EVOKED UPR&LWR LIMBS: CPT | Performed by: ORTHOPAEDIC SURGERY

## 2018-05-24 PROCEDURE — 0RB30ZZ EXCISION OF CERVICAL VERTEBRAL DISC, OPEN APPROACH: ICD-10-PCS | Performed by: ORTHOPAEDIC SURGERY

## 2018-05-24 PROCEDURE — 00NW0ZZ RELEASE CERVICAL SPINAL CORD, OPEN APPROACH: ICD-10-PCS | Performed by: ORTHOPAEDIC SURGERY

## 2018-05-24 PROCEDURE — 76001 XR FLUOROSCOPE EXAM >1 HR EXTENSIVE (CPT=76001): CPT | Performed by: ORTHOPAEDIC SURGERY

## 2018-05-24 PROCEDURE — 88304 TISSUE EXAM BY PATHOLOGIST: CPT | Performed by: ORTHOPAEDIC SURGERY

## 2018-05-24 PROCEDURE — 95938 SOMATOSENSORY TESTING: CPT | Performed by: ORTHOPAEDIC SURGERY

## 2018-05-24 PROCEDURE — 00BT0ZZ EXCISION OF SPINAL MENINGES, OPEN APPROACH: ICD-10-PCS | Performed by: ORTHOPAEDIC SURGERY

## 2018-05-24 PROCEDURE — 85027 COMPLETE CBC AUTOMATED: CPT | Performed by: PHYSICIAN ASSISTANT

## 2018-05-24 PROCEDURE — 88311 DECALCIFY TISSUE: CPT | Performed by: ORTHOPAEDIC SURGERY

## 2018-05-24 PROCEDURE — 95861 NEEDLE EMG 2 EXTREMITIES: CPT | Performed by: ORTHOPAEDIC SURGERY

## 2018-05-24 DEVICE — ARCHON PLATE 42NN 2-LEVEL: Type: IMPLANTABLE DEVICE | Site: NECK | Status: FUNCTIONAL

## 2018-05-24 DEVICE — ARCHON SCRW 4.5X15 SLF-TP VAR: Type: IMPLANTABLE DEVICE | Site: NECK | Status: FUNCTIONAL

## 2018-05-24 DEVICE — COROENT ACR MRKR 8X17X14 10DEG: Type: IMPLANTABLE DEVICE | Site: NECK | Status: FUNCTIONAL

## 2018-05-24 DEVICE — ARCHON SCRW 4.0X15 SLF-TP VAR: Type: IMPLANTABLE DEVICE | Site: NECK | Status: FUNCTIONAL

## 2018-05-24 DEVICE — OSTEOCEL PRO BONE MATRIX SM: Type: IMPLANTABLE DEVICE | Site: NECK | Status: FUNCTIONAL

## 2018-05-24 RX ORDER — SODIUM CHLORIDE, SODIUM LACTATE, POTASSIUM CHLORIDE, CALCIUM CHLORIDE 600; 310; 30; 20 MG/100ML; MG/100ML; MG/100ML; MG/100ML
INJECTION, SOLUTION INTRAVENOUS CONTINUOUS
Status: DISCONTINUED | OUTPATIENT
Start: 2018-05-24 | End: 2018-05-24 | Stop reason: HOSPADM

## 2018-05-24 RX ORDER — BISACODYL 10 MG
10 SUPPOSITORY, RECTAL RECTAL
Status: DISCONTINUED | OUTPATIENT
Start: 2018-05-24 | End: 2018-05-25

## 2018-05-24 RX ORDER — ONDANSETRON 2 MG/ML
4 INJECTION INTRAMUSCULAR; INTRAVENOUS AS NEEDED
Status: DISCONTINUED | OUTPATIENT
Start: 2018-05-24 | End: 2018-05-24 | Stop reason: HOSPADM

## 2018-05-24 RX ORDER — DOCUSATE SODIUM 100 MG/1
100 CAPSULE, LIQUID FILLED ORAL 2 TIMES DAILY
Status: DISCONTINUED | OUTPATIENT
Start: 2018-05-24 | End: 2018-05-25

## 2018-05-24 RX ORDER — CEFAZOLIN SODIUM/WATER 2 G/20 ML
2 SYRINGE (ML) INTRAVENOUS ONCE
Status: COMPLETED | OUTPATIENT
Start: 2018-05-24 | End: 2018-05-24

## 2018-05-24 RX ORDER — OXYCODONE HYDROCHLORIDE 5 MG/1
5 TABLET ORAL EVERY 4 HOURS PRN
Status: DISCONTINUED | OUTPATIENT
Start: 2018-05-24 | End: 2018-05-25

## 2018-05-24 RX ORDER — MIDAZOLAM HYDROCHLORIDE 1 MG/ML
1 INJECTION INTRAMUSCULAR; INTRAVENOUS EVERY 5 MIN PRN
Status: DISCONTINUED | OUTPATIENT
Start: 2018-05-24 | End: 2018-05-24 | Stop reason: HOSPADM

## 2018-05-24 RX ORDER — HYDROCODONE BITARTRATE AND ACETAMINOPHEN 5; 325 MG/1; MG/1
1 TABLET ORAL AS NEEDED
Status: DISCONTINUED | OUTPATIENT
Start: 2018-05-24 | End: 2018-05-24 | Stop reason: HOSPADM

## 2018-05-24 RX ORDER — OXYCODONE HYDROCHLORIDE 10 MG/1
10 TABLET ORAL EVERY 4 HOURS PRN
Status: DISCONTINUED | OUTPATIENT
Start: 2018-05-24 | End: 2018-05-25

## 2018-05-24 RX ORDER — HYDROCODONE BITARTRATE AND ACETAMINOPHEN 5; 325 MG/1; MG/1
2 TABLET ORAL AS NEEDED
Status: DISCONTINUED | OUTPATIENT
Start: 2018-05-24 | End: 2018-05-24 | Stop reason: HOSPADM

## 2018-05-24 RX ORDER — DEXTROSE MONOHYDRATE 25 G/50ML
50 INJECTION, SOLUTION INTRAVENOUS
Status: DISCONTINUED | OUTPATIENT
Start: 2018-05-24 | End: 2018-05-24 | Stop reason: HOSPADM

## 2018-05-24 RX ORDER — MEPERIDINE HYDROCHLORIDE 25 MG/ML
12.5 INJECTION INTRAMUSCULAR; INTRAVENOUS; SUBCUTANEOUS AS NEEDED
Status: DISCONTINUED | OUTPATIENT
Start: 2018-05-24 | End: 2018-05-24 | Stop reason: HOSPADM

## 2018-05-24 RX ORDER — CEFAZOLIN SODIUM/WATER 2 G/20 ML
SYRINGE (ML) INTRAVENOUS
Status: DISPENSED
Start: 2018-05-24 | End: 2018-05-24

## 2018-05-24 RX ORDER — ONDANSETRON 2 MG/ML
4 INJECTION INTRAMUSCULAR; INTRAVENOUS EVERY 4 HOURS PRN
Status: DISCONTINUED | OUTPATIENT
Start: 2018-05-24 | End: 2018-05-25

## 2018-05-24 RX ORDER — GABAPENTIN 600 MG/1
TABLET ORAL
Status: COMPLETED
Start: 2018-05-24 | End: 2018-05-24

## 2018-05-24 RX ORDER — HYDROMORPHONE HYDROCHLORIDE 1 MG/ML
0.4 INJECTION, SOLUTION INTRAMUSCULAR; INTRAVENOUS; SUBCUTANEOUS EVERY 5 MIN PRN
Status: DISCONTINUED | OUTPATIENT
Start: 2018-05-24 | End: 2018-05-24

## 2018-05-24 RX ORDER — HYDROMORPHONE HYDROCHLORIDE 1 MG/ML
0.4 INJECTION, SOLUTION INTRAMUSCULAR; INTRAVENOUS; SUBCUTANEOUS EVERY 2 HOUR PRN
Status: DISCONTINUED | OUTPATIENT
Start: 2018-05-24 | End: 2018-05-25

## 2018-05-24 RX ORDER — DIAZEPAM 5 MG/1
5 TABLET ORAL EVERY 6 HOURS PRN
Status: DISCONTINUED | OUTPATIENT
Start: 2018-05-24 | End: 2018-05-25

## 2018-05-24 RX ORDER — INSULIN ASPART 100 [IU]/ML
INJECTION, SOLUTION INTRAVENOUS; SUBCUTANEOUS ONCE
Status: DISCONTINUED | OUTPATIENT
Start: 2018-05-24 | End: 2018-05-24 | Stop reason: HOSPADM

## 2018-05-24 RX ORDER — GABAPENTIN 600 MG/1
600 TABLET ORAL ONCE
Status: COMPLETED | OUTPATIENT
Start: 2018-05-24 | End: 2018-05-24

## 2018-05-24 RX ORDER — MORPHINE SULFATE 15 MG/1
15 TABLET ORAL EVERY 4 HOURS PRN
Status: DISCONTINUED | OUTPATIENT
Start: 2018-05-24 | End: 2018-05-25

## 2018-05-24 RX ORDER — HYDROMORPHONE HYDROCHLORIDE 1 MG/ML
0.2 INJECTION, SOLUTION INTRAMUSCULAR; INTRAVENOUS; SUBCUTANEOUS EVERY 2 HOUR PRN
Status: DISCONTINUED | OUTPATIENT
Start: 2018-05-24 | End: 2018-05-25

## 2018-05-24 RX ORDER — DIPHENHYDRAMINE HYDROCHLORIDE 50 MG/ML
25 INJECTION INTRAMUSCULAR; INTRAVENOUS EVERY 4 HOURS PRN
Status: DISCONTINUED | OUTPATIENT
Start: 2018-05-24 | End: 2018-05-25

## 2018-05-24 RX ORDER — ONDANSETRON 2 MG/ML
INJECTION INTRAMUSCULAR; INTRAVENOUS
Status: COMPLETED
Start: 2018-05-24 | End: 2018-05-24

## 2018-05-24 RX ORDER — CYCLOBENZAPRINE HCL 5 MG
5 TABLET ORAL 3 TIMES DAILY PRN
Status: DISCONTINUED | OUTPATIENT
Start: 2018-05-24 | End: 2018-05-25

## 2018-05-24 RX ORDER — AMLODIPINE BESYLATE 5 MG/1
5 TABLET ORAL DAILY
Status: DISCONTINUED | OUTPATIENT
Start: 2018-05-24 | End: 2018-05-25

## 2018-05-24 RX ORDER — NALOXONE HYDROCHLORIDE 0.4 MG/ML
80 INJECTION, SOLUTION INTRAMUSCULAR; INTRAVENOUS; SUBCUTANEOUS AS NEEDED
Status: DISCONTINUED | OUTPATIENT
Start: 2018-05-24 | End: 2018-05-24 | Stop reason: HOSPADM

## 2018-05-24 RX ORDER — CEFAZOLIN SODIUM/WATER 2 G/20 ML
2 SYRINGE (ML) INTRAVENOUS EVERY 8 HOURS
Status: COMPLETED | OUTPATIENT
Start: 2018-05-24 | End: 2018-05-25

## 2018-05-24 RX ORDER — METOCLOPRAMIDE HYDROCHLORIDE 5 MG/ML
10 INJECTION INTRAMUSCULAR; INTRAVENOUS EVERY 6 HOURS PRN
Status: DISCONTINUED | OUTPATIENT
Start: 2018-05-24 | End: 2018-05-25

## 2018-05-24 RX ORDER — DEXTROSE MONOHYDRATE 25 G/50ML
50 INJECTION, SOLUTION INTRAVENOUS
Status: DISCONTINUED | OUTPATIENT
Start: 2018-05-24 | End: 2018-05-25

## 2018-05-24 RX ORDER — HYDROMORPHONE HYDROCHLORIDE 1 MG/ML
0.8 INJECTION, SOLUTION INTRAMUSCULAR; INTRAVENOUS; SUBCUTANEOUS EVERY 2 HOUR PRN
Status: DISCONTINUED | OUTPATIENT
Start: 2018-05-24 | End: 2018-05-25

## 2018-05-24 RX ORDER — BACITRACIN 50000 [USP'U]/1
INJECTION, POWDER, LYOPHILIZED, FOR SOLUTION INTRAMUSCULAR AS NEEDED
Status: DISCONTINUED | OUTPATIENT
Start: 2018-05-24 | End: 2018-05-24 | Stop reason: HOSPADM

## 2018-05-24 RX ORDER — DEXTROSE MONOHYDRATE 25 G/50ML
50 INJECTION, SOLUTION INTRAVENOUS
Status: DISCONTINUED | OUTPATIENT
Start: 2018-05-24 | End: 2018-05-24

## 2018-05-24 RX ORDER — SENNOSIDES 8.6 MG
17.2 TABLET ORAL NIGHTLY
Status: DISCONTINUED | OUTPATIENT
Start: 2018-05-24 | End: 2018-05-25

## 2018-05-24 RX ORDER — DIPHENHYDRAMINE HCL 25 MG
25 CAPSULE ORAL EVERY 4 HOURS PRN
Status: DISCONTINUED | OUTPATIENT
Start: 2018-05-24 | End: 2018-05-25

## 2018-05-24 RX ORDER — CELECOXIB 200 MG/1
200 CAPSULE ORAL ONCE
Status: COMPLETED | OUTPATIENT
Start: 2018-05-24 | End: 2018-05-24

## 2018-05-24 RX ORDER — HYDROMORPHONE HYDROCHLORIDE 1 MG/ML
0.5 INJECTION, SOLUTION INTRAMUSCULAR; INTRAVENOUS; SUBCUTANEOUS EVERY 5 MIN PRN
Status: DISCONTINUED | OUTPATIENT
Start: 2018-05-24 | End: 2018-05-24 | Stop reason: HOSPADM

## 2018-05-24 RX ORDER — ONDANSETRON 2 MG/ML
4 INJECTION INTRAMUSCULAR; INTRAVENOUS ONCE
Status: COMPLETED | OUTPATIENT
Start: 2018-05-24 | End: 2018-05-24

## 2018-05-24 RX ORDER — DEXAMETHASONE SODIUM PHOSPHATE 10 MG/ML
10 INJECTION, SOLUTION INTRAMUSCULAR; INTRAVENOUS EVERY 8 HOURS
Status: COMPLETED | OUTPATIENT
Start: 2018-05-24 | End: 2018-05-25

## 2018-05-24 RX ORDER — CELECOXIB 200 MG/1
CAPSULE ORAL
Status: COMPLETED
Start: 2018-05-24 | End: 2018-05-24

## 2018-05-24 RX ORDER — SODIUM CHLORIDE 9 MG/ML
INJECTION, SOLUTION INTRAVENOUS CONTINUOUS
Status: DISCONTINUED | OUTPATIENT
Start: 2018-05-24 | End: 2018-05-25

## 2018-05-24 RX ORDER — SODIUM CHLORIDE, SODIUM LACTATE, POTASSIUM CHLORIDE, CALCIUM CHLORIDE 600; 310; 30; 20 MG/100ML; MG/100ML; MG/100ML; MG/100ML
INJECTION, SOLUTION INTRAVENOUS CONTINUOUS
Status: DISCONTINUED | OUTPATIENT
Start: 2018-05-24 | End: 2018-05-25

## 2018-05-24 RX ORDER — SODIUM PHOSPHATE, DIBASIC AND SODIUM PHOSPHATE, MONOBASIC 7; 19 G/133ML; G/133ML
1 ENEMA RECTAL ONCE AS NEEDED
Status: DISCONTINUED | OUTPATIENT
Start: 2018-05-24 | End: 2018-05-25

## 2018-05-24 RX ORDER — POLYETHYLENE GLYCOL 3350 17 G/17G
17 POWDER, FOR SOLUTION ORAL DAILY PRN
Status: DISCONTINUED | OUTPATIENT
Start: 2018-05-24 | End: 2018-05-25

## 2018-05-24 RX ORDER — MEPERIDINE HYDROCHLORIDE 50 MG/ML
INJECTION INTRAMUSCULAR; INTRAVENOUS; SUBCUTANEOUS
Status: COMPLETED
Start: 2018-05-24 | End: 2018-05-24

## 2018-05-24 NOTE — PROGRESS NOTES
S:   Denies difficulty breathing or swallowing  He denies numbness or arm pain    Inspection: Awake Alert  No acute distress. Blood pressure 152/86, pulse 63, temperature 98.3 °F (36.8 °C), temperature source Oral, resp.  rate 14, height 5' 10\" (1.778

## 2018-05-24 NOTE — INTERVAL H&P NOTE
Pre-op Diagnosis: Cervical spinal stenosis [M48.02]    The above referenced H&P was reviewed by Isabel Love MD on 5/24/2018, the patient was examined and no significant changes have occurred in the patient's condition since the H&P was performed.   I dis

## 2018-05-24 NOTE — PROGRESS NOTES
NURSING ADMISSION NOTE      Patient admitted via bed from PACU post ACDF C5-7  Oriented to room. Patient received awake, alert and oriented x 4. Safety precautions initiated. Bed in low position.   Call light in reach, instructed to call for assistan

## 2018-05-24 NOTE — ANESTHESIA PREPROCEDURE EVALUATION
PRE-OP EVALUATION    Patient Name: Kari Serna    Pre-op Diagnosis: Cervical spinal stenosis [M48.02]    Procedure(s):  cervical 5-cervical 6, cervical 6-cervical 7 anterior cervical discectomy fusion       Surgeon(s) and Role:     Lisbet Pena MD - Surgeon: Marvin Pulliam MD;  Location: [address] [place] [address]  03/15/2018: BACK SURGERY      Comment: L3-L5 Decompression, L4-L5 Discectomy                uninstrumented fusion   3/10: COLONOSCOPY      Comment: polyps, due Results  Component Value Date   WBC 7.20 05/12/2018   RBC 4.39 05/12/2018   HGB 14.3 05/12/2018   HCT 40.9 05/12/2018   MCV 93.2 05/12/2018   MCH 32.6 05/12/2018   MCHC 35.0 05/12/2018   RDW 14.8 05/12/2018    (L) 05/12/2018       Lab Results  [place]

## 2018-05-24 NOTE — BRIEF OP NOTE
Pre-Operative Diagnosis: Cervical spinal stenosis [M48.02]     Post-Operative Diagnosis: Cervical spinal stenosis [M48.02]      Procedure Performed:   Procedure(s):  cervical 5-cervical 6, cervical 6-cervical 7 anterior cervical discectomy fusion       Jacqueline

## 2018-05-24 NOTE — ANESTHESIA POSTPROCEDURE EVALUATION
[address] [facility],[address] Patient Status:  Surgery Admit   Age/Gender 71year old male MRN [id number]   Location [address] [facility] Attending Ishan Robison MD   Hosp Day # 0 PCP Lizeth Pringle MD       Anesthesia Post-op No

## 2018-05-24 NOTE — CONSULTS
No chief complaint on file. PCP: Noy Saucedo MD      History of Present Illness: Patient is a 71year old male with PMH sig for DM, hx of DVT (no longer on AC), HTN, HLD, here for scheduled surgery. Pt is s/p ACDF C5-7. Post op doing well. ANESTHESIA, RECTAL;                 Surgeon: Atilio Manzano MD;  Location: [address] [place] [address]  03/15/2018: BACK SURGERY      Comment: L3-L5 Decompression, L4-L5 Discectomy                uninstrumented fusion   3/10: COLONOSCOPY of Onset   • Other [OTHER] Mother      scleroderma   • Diabetes Neg    • Cancer Neg    • Heart Disorder Neg    • Hypertension Neg    • Lipids Neg        Review of Systems  Comprehensive ROS reviewed and negative except for what is stated in HPI.       Randall Delcid

## 2018-05-25 VITALS
BODY MASS INDEX: 28.92 KG/M2 | RESPIRATION RATE: 18 BRPM | HEIGHT: 70 IN | HEART RATE: 75 BPM | TEMPERATURE: 98 F | SYSTOLIC BLOOD PRESSURE: 148 MMHG | OXYGEN SATURATION: 97 % | WEIGHT: 202 LBS | DIASTOLIC BLOOD PRESSURE: 83 MMHG

## 2018-05-25 PROCEDURE — 97535 SELF CARE MNGMENT TRAINING: CPT

## 2018-05-25 PROCEDURE — 97161 PT EVAL LOW COMPLEX 20 MIN: CPT

## 2018-05-25 PROCEDURE — 97165 OT EVAL LOW COMPLEX 30 MIN: CPT

## 2018-05-25 PROCEDURE — 82962 GLUCOSE BLOOD TEST: CPT

## 2018-05-25 PROCEDURE — 85027 COMPLETE CBC AUTOMATED: CPT | Performed by: PHYSICIAN ASSISTANT

## 2018-05-25 RX ORDER — HYDROCODONE BITARTRATE AND ACETAMINOPHEN 10; 325 MG/1; MG/1
1 TABLET ORAL EVERY 4 HOURS PRN
Status: DISCONTINUED | OUTPATIENT
Start: 2018-05-25 | End: 2018-05-25

## 2018-05-25 RX ORDER — HYDROCODONE BITARTRATE AND ACETAMINOPHEN 10; 325 MG/1; MG/1
2 TABLET ORAL EVERY 6 HOURS PRN
Status: DISCONTINUED | OUTPATIENT
Start: 2018-05-25 | End: 2018-05-25

## 2018-05-25 NOTE — PHYSICAL THERAPY NOTE
PHYSICAL THERAPY QUICK EVALUATION - INPATIENT    Room Number: 366/366-A  Evaluation Date: 5/25/2018  Presenting Problem: s/p C5-7 ACDF 5/24/18  Physician Order: PT Eval and Treat  History Related to Present Admit:  Pt admitted 5/24/18 for elective C5-7 A ESOPHAGOGASTRODUODENOSCOPY,                COLONOSCOPY, POSSIBLE BIOPSY, POSSIBLE                POLYPECTOMY 70082,10433;  Surgeon: Beltran Lawson MD;  Location: [address] [place] [address]  3/5/2013: PATIENT [place] PREOPERATIVE ORDER FOR IV (including adjusting bedclothes, sheets and blankets)?: None   -   Sitting down on and standing up from a chair with arms (e.g., wheelchair, bedside commode, etc.): None   -   Moving from lying on back to sitting on the side of the bed?: None   How much he Results of the AM-PAC \"6 clicks\" Inpatient Daily Mobility Short Form for the patient is 0% degree of basic mobility impairment. Research supports that patients with this level of impairment often benefit from home.     Based on this evaluation, patient's

## 2018-05-25 NOTE — OCCUPATIONAL THERAPY NOTE
OCCUPATIONAL THERAPY QUICK EVALUATION - INPATIENT    Room Number: 366/366-A  Evaluation Date: 5/25/2018     Type of Evaluation: Quick Eval  Presenting Problem: s/p C5-C7 ACDF 5/24/18    Physician Order: Diana Sampson Consult to Occupational Therapy  Reason for [employer] Leonidas Hand MD;  Location: [address] [place] [address]  No date: OTHER SURGICAL HISTORY      Comment: pilonidal cyst  No date: OTHER SURGICAL HISTORY      Comment: hammertoes and bunionb/l   3/5/2013: PATIENT DOCUMENTED NOT TO HAVE [address]  Location: \"sore throat\"  Management Techniques: Activity promotion; Body mechanics;Breathing techniques;Relaxation;Repositioning    COGNITION  WFL    RANGE OF MOTION AND STRENGTH ASSESSMENT  Upper extremity ROM is within functional limits     Upper ext transfers (simulated Mod I) with good verbal understanding and reports no further questions. Patient End of Session: Up in chair;Needs met;Call light within reach;RN aware of session/findings; All patient questions and concerns addressed    ASSESSMENT

## 2018-05-25 NOTE — DISCHARGE SUMMARY
General Medicine Discharge Summary     Patient ID:  Tone Williamson  71year old  7/15/1945    Admit date: 5/24/2018    Discharge date and time: No discharge date for patient encounter.      Attending Physic EXTRA STRENGTH) 500 MG Oral Cap  Take 1,000 mg by mouth one time. Cyanocobalamin (B-12 OR)  Take by mouth daily. CINNAMON OR  Take by mouth daily. glimepiride 2 MG Oral Tab  Take 2 mg by mouth 2 (two) times daily.     Losartan Potassium-HCTZ 100-12

## 2018-05-25 NOTE — PROGRESS NOTES
S:   Denies difficulty breathing or swallowing  He denies numbness he feels very good. He has no significant pain. He has been eating w/o issues. He wants to go home    Inspection: Awake Alert  No acute distress.      Blood pressure 148/83, pulse 75, tem

## 2018-05-25 NOTE — CM/SW NOTE
Pt is sp ACDF. Post op order for discharge planning. PT is recommending pt is safe for home discharge. Patient was screened during rounds and no home nursing needs are identified at this time. RN to contact SW/CM if needs arise.

## 2018-05-25 NOTE — PROGRESS NOTES
Patient discharge to home this afternoon patient to follow up with his PCP in 1 week and with Dr Salvador Clemons in 2 weeks. Reviewed all discharge materials at bedside with patient, verbalizes understanding.  All questions answered at this time   Provided Cdiff infor

## 2018-05-27 ENCOUNTER — HOSPITAL ENCOUNTER (INPATIENT)
Facility: HOSPITAL | Age: 73
LOS: 2 days | Discharge: HOME | DRG: 356 | End: 2018-05-29
Attending: INTERNAL MEDICINE | Admitting: INTERNAL MEDICINE
Payer: MEDICARE

## 2018-05-27 ENCOUNTER — APPOINTMENT (OUTPATIENT)
Dept: INTERVENTIONAL RADIOLOGY/VASCULAR | Facility: HOSPITAL | Age: 73
DRG: 356 | End: 2018-05-27
Attending: ORTHOPAEDIC SURGERY
Payer: MEDICARE

## 2018-05-27 PROBLEM — I26.99 PULMONARY EMBOLISM (HCC): Status: ACTIVE | Noted: 2018-05-27

## 2018-05-27 PROCEDURE — 06H03DZ INSERTION OF INTRALUMINAL DEVICE INTO INFERIOR VENA CAVA, PERCUTANEOUS APPROACH: ICD-10-PCS | Performed by: RADIOLOGY

## 2018-05-27 PROCEDURE — 82962 GLUCOSE BLOOD TEST: CPT

## 2018-05-27 PROCEDURE — 37191 INS ENDOVAS VENA CAVA FILTR: CPT

## 2018-05-27 PROCEDURE — B519YZZ FLUOROSCOPY OF INFERIOR VENA CAVA USING OTHER CONTRAST: ICD-10-PCS | Performed by: RADIOLOGY

## 2018-05-27 RX ORDER — DEXTROSE MONOHYDRATE 25 G/50ML
50 INJECTION, SOLUTION INTRAVENOUS
Status: DISCONTINUED | OUTPATIENT
Start: 2018-05-27 | End: 2018-05-29

## 2018-05-27 RX ORDER — ONDANSETRON 2 MG/ML
4 INJECTION INTRAMUSCULAR; INTRAVENOUS EVERY 6 HOURS PRN
Status: DISCONTINUED | OUTPATIENT
Start: 2018-05-27 | End: 2018-05-29

## 2018-05-27 RX ORDER — SODIUM CHLORIDE 9 MG/ML
INJECTION, SOLUTION INTRAVENOUS CONTINUOUS
Status: DISCONTINUED | OUTPATIENT
Start: 2018-05-27 | End: 2018-05-27

## 2018-05-27 RX ORDER — DOCUSATE SODIUM 100 MG/1
100 CAPSULE, LIQUID FILLED ORAL 2 TIMES DAILY
Status: DISCONTINUED | OUTPATIENT
Start: 2018-05-27 | End: 2018-05-29

## 2018-05-27 RX ORDER — DEXTROSE, SODIUM CHLORIDE, SODIUM LACTATE, POTASSIUM CHLORIDE, AND CALCIUM CHLORIDE 5; .6; .31; .03; .02 G/100ML; G/100ML; G/100ML; G/100ML; G/100ML
INJECTION, SOLUTION INTRAVENOUS CONTINUOUS
Status: DISCONTINUED | OUTPATIENT
Start: 2018-05-27 | End: 2018-05-28

## 2018-05-27 RX ORDER — HYDROMORPHONE HYDROCHLORIDE 1 MG/ML
0.5 INJECTION, SOLUTION INTRAMUSCULAR; INTRAVENOUS; SUBCUTANEOUS EVERY 2 HOUR PRN
Status: DISCONTINUED | OUTPATIENT
Start: 2018-05-27 | End: 2018-05-29

## 2018-05-27 RX ORDER — METOCLOPRAMIDE HYDROCHLORIDE 5 MG/ML
10 INJECTION INTRAMUSCULAR; INTRAVENOUS EVERY 8 HOURS PRN
Status: DISCONTINUED | OUTPATIENT
Start: 2018-05-27 | End: 2018-05-29

## 2018-05-27 RX ORDER — HYDRALAZINE HYDROCHLORIDE 20 MG/ML
10 INJECTION INTRAMUSCULAR; INTRAVENOUS EVERY 6 HOURS PRN
Status: DISCONTINUED | OUTPATIENT
Start: 2018-05-27 | End: 2018-05-29

## 2018-05-27 RX ORDER — HYDROMORPHONE HYDROCHLORIDE 1 MG/ML
1 INJECTION, SOLUTION INTRAMUSCULAR; INTRAVENOUS; SUBCUTANEOUS EVERY 2 HOUR PRN
Status: DISCONTINUED | OUTPATIENT
Start: 2018-05-27 | End: 2018-05-29

## 2018-05-27 RX ORDER — HYDROCODONE BITARTRATE AND ACETAMINOPHEN 5; 325 MG/1; MG/1
1 TABLET ORAL EVERY 4 HOURS PRN
Status: DISCONTINUED | OUTPATIENT
Start: 2018-05-27 | End: 2018-05-29

## 2018-05-27 RX ORDER — LIDOCAINE HYDROCHLORIDE 10 MG/ML
INJECTION, SOLUTION EPIDURAL; INFILTRATION; INTRACAUDAL; PERINEURAL
Status: COMPLETED
Start: 2018-05-27 | End: 2018-05-27

## 2018-05-27 RX ORDER — HEPARIN SODIUM 5000 [USP'U]/ML
INJECTION, SOLUTION INTRAVENOUS; SUBCUTANEOUS
Status: COMPLETED
Start: 2018-05-27 | End: 2018-05-27

## 2018-05-27 RX ORDER — MIDAZOLAM HYDROCHLORIDE 1 MG/ML
INJECTION INTRAMUSCULAR; INTRAVENOUS
Status: DISCONTINUED
Start: 2018-05-27 | End: 2018-05-27 | Stop reason: WASHOUT

## 2018-05-27 RX ORDER — HYDROCODONE BITARTRATE AND ACETAMINOPHEN 5; 325 MG/1; MG/1
2 TABLET ORAL EVERY 4 HOURS PRN
Status: DISCONTINUED | OUTPATIENT
Start: 2018-05-27 | End: 2018-05-29

## 2018-05-27 RX ORDER — ACETAMINOPHEN 325 MG/1
650 TABLET ORAL EVERY 4 HOURS PRN
Status: DISCONTINUED | OUTPATIENT
Start: 2018-05-27 | End: 2018-05-29

## 2018-05-27 NOTE — PROGRESS NOTES
NURSING ADMISSION NOTE      Patient direct admit from [facility]. Patient admitted for large PE. Recent spinal surgery with Dr. Fabiola Pérez. [facility] due to recent surgery. IVC filter to be placed tonight. Patient alert and oriented x4.  [place]ter [place]

## 2018-05-27 NOTE — H&P
[facility] Hospitalist History and Physical      No chief complaint on file. PCP: Sloane Peraza MD      History of Present Illness: Patient is a 71year old male with PMH sig for DM2, HL, HTN, h/o DVT who presents to ER with trouble swallowing.  Pt had a Comment: pilonidal cyst  No date: OTHER SURGICAL HISTORY      Comment: hammertoes and bunionb/l   3/5/2013: PATIENT DOCUMENTED NOT TO HAVE EXPERIENCED ANY*      Comment: Procedure: ESOPHAGOGASTRODUODENOSCOPY,                COLONOSCOPY, POSSIBLE BIOPSY, PO or nails. NEUROLOGIC:  No paresthesias, weakness, or numbness. PSYCHIATRIC:  No disorder of thought or mood. ENDOCRINE:  No heat or cold intolerance, polyuria or polydipsia. HEMATOLOGICAL:  No easy bruising or bleeding.      OBJECTIVE:  /82 (BP Lo Patent valleculae and piriform sinuses. Intact preglottic fat, epiglottis, and aryepiglottic folds. No glottic or supraglottic mass seen. Intact laryngeal and cricoid cartilage. NO mass seen in the visualized portions of the aerodigestive tract.  Extensive characteristics are identified. VASCULAR STRUCTURES: Mild mass effect on the LEFT common carotid artery, with lateral displacement. Near effacement of the LEFT internal jugular vein, withOUT evidence of thrombosis.  Mild bilateral carotid bifurcation calc the possibility of a viscous perforation of the proximal esophagus, but this could still represent recent postoperative changes. Recommend esophagram for further evaluation.  3. Thin small nondrainable rim-enhancing retropharyngeal fluid collection is seen provided during cervical spine fusion. Anterior cervical spine fusion of C5 through  C7 noted with interbody disc graft at C5-6 and C6-7.       CONCLUSION:  The images obtained and the fluoroscopy which was provided was for planning purposes at the time of patients current state of illness, I anticipate that, after discharge, patient will require TBD.

## 2018-05-27 NOTE — PROCEDURES
[address] [facility],[address] Patient Status:  Inpatient    7/15/1945 MRN [id number]   [facility] 7NE-A Attending Ofelia Duarte, DO   Hosp Day # 0 PCP Al Anthony MD         Brief Procedure Report    Pre-Operative Diagnosis: PE

## 2018-05-27 NOTE — PROCEDURES
[facility]  Pre-Procedure Note    Name: Roberto Carlos Blevins  MRN#: [id number]  : 7/15/1945    Procedure:  IVC filter    Indication: PE.  Recent postop cervical spine surgery. Difficulty swallowing secretions.   Contraindication for anticoagulation currentl

## 2018-05-28 ENCOUNTER — APPOINTMENT (OUTPATIENT)
Dept: ULTRASOUND IMAGING | Facility: HOSPITAL | Age: 73
DRG: 356 | End: 2018-05-28
Attending: INTERNAL MEDICINE
Payer: MEDICARE

## 2018-05-28 PROCEDURE — 93970 EXTREMITY STUDY: CPT | Performed by: INTERNAL MEDICINE

## 2018-05-28 PROCEDURE — 80048 BASIC METABOLIC PNL TOTAL CA: CPT | Performed by: INTERNAL MEDICINE

## 2018-05-28 PROCEDURE — 92610 EVALUATE SWALLOWING FUNCTION: CPT

## 2018-05-28 PROCEDURE — 82962 GLUCOSE BLOOD TEST: CPT

## 2018-05-28 PROCEDURE — 85025 COMPLETE CBC W/AUTO DIFF WBC: CPT | Performed by: INTERNAL MEDICINE

## 2018-05-28 RX ORDER — SODIUM CHLORIDE 9 MG/ML
INJECTION, SOLUTION INTRAVENOUS CONTINUOUS
Status: DISCONTINUED | OUTPATIENT
Start: 2018-05-28 | End: 2018-05-29

## 2018-05-28 RX ORDER — DEXAMETHASONE SODIUM PHOSPHATE 4 MG/ML
8 VIAL (ML) INJECTION EVERY 8 HOURS
Status: COMPLETED | OUTPATIENT
Start: 2018-05-28 | End: 2018-05-29

## 2018-05-28 RX ORDER — BISACODYL 10 MG
10 SUPPOSITORY, RECTAL RECTAL
Status: DISCONTINUED | OUTPATIENT
Start: 2018-05-28 | End: 2018-05-29

## 2018-05-28 NOTE — PROGRESS NOTES
S:  He was re-admitted after difficulty swallowing and increased phlegm production. He told me yesterday that he was having difficulty swallowing his secretions. I sent him to urgent care.   He was found to have a pulmonary emboli as well as his dysphagia

## 2018-05-28 NOTE — PLAN OF CARE
Assumed care at 1900. Alert and oriented x4. Telemetry monitor reading SR. IVF infusing assessed and maintained. Anterior neck dressing with small old drainage dry and intact. Rt groin dressing dry and intact, no bleeding, no hematoma.  Fall precautions ma

## 2018-05-28 NOTE — PROGRESS NOTES
[facility] Hospitalist Progress Note                                                                   [address] [facility],[address]  7/15/1945    SUBJECTIVE: pt feels swallowing much improved today. Denies pain.  Jose Angel Burt **OR** HYDROcodone-acetaminophen, ondansetron HCl, Metoclopramide HCl, HYDROmorphone HCl **OR** HYDROmorphone HCl, hydrALAzine HCl    Assessment/Plan:  Active Problems:    Pulmonary embolism (HCC)         #Post op dysphagia/odynophagia  -pain control with

## 2018-05-28 NOTE — OPERATIVE REPORT
[facility]    PATIENT'S NAME: Demi Lee   ATTENDING PHYSICIAN: Gurpreet London M.D. OPERATING PHYSICIAN: Gurpreet London M.D.    PATIENT ACCOUNT#:   [de-identified]    LOCATION:  [address] [place] [address]  MEDICAL RECORD #:   [id number]       DATE OF BIRTH:  [birth year] head was placed in the [place] headholder in neutral position. Fluoroscopy was wheeled in to leonarda an oblique incision at the medial border of the left sternocleidomastoid muscle. The neck was sterilely prepped and draped.     An incision was made with a joints. The appropriate hyperlordotic cage was subsequently utilized. A cystic mass adherent to the dura was identified and removed with sharp curettes, Kerrisons, and a pituitary. It was sent to Pathology for analysis.  The foramen were cleared bilatera Hemostasis was maintained. We contoured and applied a cervical plate and applied screws, which had appropriate purchase. The plate was deemed as flush, and then we applied locking caps to all screws as well.   X-ray confirmed appropriate reduction of lord

## 2018-05-28 NOTE — SLP NOTE
ADULT SWALLOWING EVALUATION    ASSESSMENT    ASSESSMENT/OVERALL IMPRESSION:  Patient seen for swallowing assessment as he developed difficulty swallowing post ACDF completed 5/24/18. Patient also with incidental finding of PE on imaging.   He has had a Gre SLP to reassess  Discharge Recommendations/Plan: Home    HISTORY   MEDICAL HISTORY  Reason for Referral: R/O aspiration (admitted with difficulty swallowing post ACDF 5/24/18)    Problem List  Active Problems:    Pulmonary embolism [facility])      Past Medical nondrainable rim-enhancing retropharyngeal fluid collection is seen on image 79,  series 4, measuring 3 x 28 x 30 mm, possibly representing postoperative hematoma or a developing  retropharyngeal abscess.  NO additional rim-enhancing fluid collections are s to reassess  Number of Visits to Meet Established Goals: 3  Follow Up Needed: Yes  SLP Follow-up Date: 05/29/18    Thank you for your referral.   If you have any questions, please contact Vida Ernst [phone]  Pager [phone]

## 2018-05-28 NOTE — PROGRESS NOTES
Hematology/Oncology Consult Note        NAME: Kvng Tomasz - ROOM: 6171/0611-[id number] - MRN: [id number] - Age: 71year old - : 7/15/1945    Reason for Consult:  DVT      Patient is a 71 y.o male who presents on this admission with history of recently noted inci ANY*      Comment: Procedure: ESOPHAGOGASTRODUODENOSCOPY,                COLONOSCOPY, POSSIBLE BIOPSY, POSSIBLE                POLYPECTOMY 79702,61874;  Surgeon: Sarah Beach MD;  Location: [address] [place] [address]  3/5/2013: Cindy Majano Neuro: denies headaches, no strokes or seizure history  Psyche: denies depression or anxiety  Hematologic: per HPI  Endocrine: denies thyroid history      Physical Exam:  /71 (BP Location: Right arm)   Pulse 69   Temp 97.9 °F (36.6 °C) (Oral)   Resp seen on image 79,  series 4, measuring 3 x 28 x 30 mm, possibly representing postoperative hematoma or a developing  retropharyngeal abscess. NO additional rim-enhancing fluid collections are seen.   4. Extensive thickening of the proximal esophagus suggest

## 2018-05-28 NOTE — PLAN OF CARE
Problem: Impaired Swallowing  Goal: Minimize aspiration risk  Interventions:  - NPO except ice chips  - Oral Care  Outcome: Progressing

## 2018-05-29 ENCOUNTER — APPOINTMENT (OUTPATIENT)
Dept: CV DIAGNOSTICS | Facility: HOSPITAL | Age: 73
DRG: 356 | End: 2018-05-29
Attending: INTERNAL MEDICINE
Payer: MEDICARE

## 2018-05-29 VITALS
HEART RATE: 85 BPM | RESPIRATION RATE: 18 BRPM | TEMPERATURE: 98 F | OXYGEN SATURATION: 98 % | DIASTOLIC BLOOD PRESSURE: 89 MMHG | HEIGHT: 70 IN | WEIGHT: 205 LBS | SYSTOLIC BLOOD PRESSURE: 153 MMHG | BODY MASS INDEX: 29.35 KG/M2

## 2018-05-29 PROCEDURE — 92610 EVALUATE SWALLOWING FUNCTION: CPT

## 2018-05-29 PROCEDURE — 93306 TTE W/DOPPLER COMPLETE: CPT | Performed by: INTERNAL MEDICINE

## 2018-05-29 PROCEDURE — 82962 GLUCOSE BLOOD TEST: CPT

## 2018-05-29 RX ORDER — METHYLPREDNISOLONE 4 MG/1
TABLET ORAL
Qty: 21 TABLET | Refills: 0 | Status: SHIPPED | OUTPATIENT
Start: 2018-05-29 | End: 2018-06-25 | Stop reason: ALTCHOICE

## 2018-05-29 NOTE — PLAN OF CARE
NURSING DISCHARGE NOTE    Discharged Home via Wheelchair. Accompanied by Support staff  Belongings Taken by patient/family.     Received pt A/Ox4, JUAN ANTONIO, NSR on tele at 0700  Pt had speech eval and cleared for regular diet, pt ate and felt comfortable a

## 2018-05-29 NOTE — PROGRESS NOTES
Pt currently undergoing U/S    He states is swallowing is much better. He states having eaten and not having difficulty.     He wants to go home    Exam limitted due to him actively undergoing a study    If cleared by all other services OK to d/c home fro

## 2018-05-29 NOTE — PLAN OF CARE
Assumed care at 1900. Pt A&Ox4, VSS on RA. SR/SB on tele, HR drops into the mid 40's with sleep. Denies any chest pain or sob. Tolerating ice chips, otherwise NPO. Speech therapy to reevaluate today. IVF infusing. Rt groin soft, dressing intact.  Neck incis

## 2018-05-29 NOTE — DISCHARGE SUMMARY
General Medicine Discharge Summary     Patient ID:  Ilana Wharton  71year old  7/15/1945    Admit date: 5/27/2018    Discharge date and time: 5/29/18    Attending Physician: DO Cassidy Hernandez PATHOLOGY    Operative Procedures:        Patient instructions:      Current Discharge Medication List    START taking these medications    methylPREDNISolone 4 MG Oral Tablet Therapy Pack  Use as directed for 6 days      CONTINUE these medications which h

## 2018-05-29 NOTE — PAYOR COMM NOTE
--------------  Order Requisition   Admit to inpatient Once (Order #[id number]) on 5/27/18        ADMISSION REVIEW     Payor: [facility] [place] #:  [id number]  Authorization Number: N/A    Admit date: 5/27/18  Admit time: 8849       VFGI multiple small foci of soft tissue air. Differential considerations would include postoperative changes versus soft tissue infection.  The presence of air raises the possibility of a viscous  perforation of the proximal esophagus, but this could still repre noted. Found to have PE on imaging incidentally.      Past Medical History:   • DIABETES    • DVT (deep venous thrombosis) ([facility] [address]     bilateral legs- different times though   • Gastritis 3/13    H pylori negative   • GOUT    • HYPERLIPIDEMIA    • HYPE Evaluation of the oral cavity is limited by dental artifact. The visualized portions of the oral tongue and floor of mouth are intact. No sublingual fluid collections.  SALIVARY GLANDS: The parotid and submandibular salivary glands have a symmetric appeara The skull base is intact. Minimal inferior maxillary sinus mucosal thickening. OSSEOUS STRUCTURES: Postoperative changes seen from recent anterior discectomy and cervical fusion C5, C6 and C7.  Interbody fusion devices are seen at C5-C6 and C6-C7, withOUT infectious esophagitis. Correlate with esophagram. 5. NO significant cervical lymphadenopathy is detected at this time. 6. Significant narrowing of the LEFT internal jugular vein, withOUT evidence of venous thrombosis at this time.  Notification of importan history DM, htn, hl, and recent cervical fusion on 5/24 now with PE noticed on workup for trouble swallowing.  No chest pain or shortness of breath      Lab  05/28/18   0511   RBC  3.44*   HGB  11.1*   HCT  31.4*   MCV  91.3   MCH  32.3   MCHC  35.4   RDW IMPRESSION:  Patient seen for swallowing assessment as he developed difficulty swallowing post ACDF completed 5/24/18. Patient also with incidental finding of PE on imaging. He has had a Santos filter placed. Patient provided history.   He reported h reassess swallow post medical treatment for dysphagia. Patient alert and up in bed, quite  and reports he feels he is swallowing much better.   He reports not needing suction anymore to aid in secretion management.      His oral mechanism exam was W status per guidelines for severe dysphagia requiring IVF to maintain hydration and IV steroids, Also with acute PE and DVT requiring emergent IVC filter placement.

## 2018-05-29 NOTE — PLAN OF CARE
Dr Anirudh Ramirez notified of bilateral le dopplers showing dvt in right leg. No further orders at this time. Will cont to monitor.

## 2018-05-29 NOTE — PROGRESS NOTES
Hematology/Oncology Consult Note        NAME: Nick Mendez - ROOM: 0195/6089-[id number] - MRN: [id number] - Age: 71year old - : 7/15/1945    Reason for Consult:  DEEP VEIN THROMBOSIS/PULMONARY EMBOLISM      He is doing well today      Past Medical History:   [employer]  3/5/2013: PATIENT [place] PREOPERATIVE ORDER FOR IV ANT*      Comment: Procedure: ESOPHAGOGASTRODUODENOSCOPY,                COLONOSCOPY, POSSIBLE BIOPSY, POSSIBLE                POLYPECTOMY 29115,13088;  Surgeon: Donya Edgar MD;  L (36.6 °C) (Oral)   Resp 18   Ht 1.778 m (5' 10\")   Wt 93 kg (205 lb 0.4 oz)   SpO2 98%   BMI 29.42 kg/m²   Physical Exam:   General: alert, cooperative, no respiratory distress. Head: Normocephalic, without obvious abnormality, atraumatic.    Throat: Lip inflammatory or infectious  esophagitis. Correlate with esophagram.  5. NO significant cervical lymphadenopathy is detected at this time. 6. Significant narrowing of the LEFT internal jugular vein, withOUT evidence of venous thrombosis at  this time.   Not

## 2018-05-29 NOTE — PLAN OF CARE
After multiple consults decision is clear for pt that he needs to stay for steriods and have swallow eval again in am. Pt agreeable to plan of care. Ok for ice chips. Will cont to monitor.

## 2018-05-29 NOTE — SLP NOTE
ADULT SWALLOWING EVALUATION    ASSESSMENT    ASSESSMENT/OVERALL IMPRESSION:  Patient seen to reassess swallow post medical treatment for dysphagia. Patient alert and up in bed, quite  and reports he feels he is swallowing much better.   He reports n thrombosis) ([facility]     bilateral legs- different times though   • Gastritis 3/13    H pylori negative   • GOUT    • High blood pressure    • HYPERLIPIDEMIA    • HYPERTENSION    • Neuropathy     shoulders arms and into the hands   • Other and unspecif to choose softer food items) consistency and thin liquids without overt signs or symptoms of aspiration with 100 % accuracy over 1-2 session(s).   In Progress   Goal #2 The patient/family/caregiver will demonstrate understanding and implementation of aspira

## 2018-06-08 PROCEDURE — 82607 VITAMIN B-12: CPT | Performed by: INTERNAL MEDICINE

## 2018-06-08 PROCEDURE — 82746 ASSAY OF FOLIC ACID SERUM: CPT | Performed by: INTERNAL MEDICINE

## 2018-06-20 PROCEDURE — 82570 ASSAY OF URINE CREATININE: CPT | Performed by: INTERNAL MEDICINE

## 2018-06-20 PROCEDURE — 82043 UR ALBUMIN QUANTITATIVE: CPT | Performed by: INTERNAL MEDICINE

## 2018-06-22 PROBLEM — I77.9 CAROTID DISEASE, BILATERAL: Status: ACTIVE | Noted: 2018-06-22

## 2018-06-22 PROBLEM — I77.9 CAROTID DISEASE, BILATERAL (HCC): Status: ACTIVE | Noted: 2018-06-22

## 2018-07-24 PROBLEM — M06.4 INFLAMMATORY POLYARTHRITIS (HCC): Status: ACTIVE | Noted: 2018-07-24

## 2018-07-24 PROCEDURE — 82085 ASSAY OF ALDOLASE: CPT | Performed by: INTERNAL MEDICINE

## 2018-07-24 PROCEDURE — 86480 TB TEST CELL IMMUN MEASURE: CPT | Performed by: INTERNAL MEDICINE

## 2018-07-24 PROCEDURE — 85610 PROTHROMBIN TIME: CPT | Performed by: INTERNAL MEDICINE

## 2018-07-24 PROCEDURE — 86704 HEP B CORE ANTIBODY TOTAL: CPT | Performed by: INTERNAL MEDICINE

## 2018-07-24 PROCEDURE — 85613 RUSSELL VIPER VENOM DILUTED: CPT | Performed by: INTERNAL MEDICINE

## 2018-07-24 PROCEDURE — 86334 IMMUNOFIX E-PHORESIS SERUM: CPT | Performed by: INTERNAL MEDICINE

## 2018-07-24 PROCEDURE — 82955 ASSAY OF G6PD ENZYME: CPT | Performed by: INTERNAL MEDICINE

## 2018-07-24 PROCEDURE — 83516 IMMUNOASSAY NONANTIBODY: CPT | Performed by: INTERNAL MEDICINE

## 2018-07-24 PROCEDURE — 85705 THROMBOPLASTIN INHIBITION: CPT | Performed by: INTERNAL MEDICINE

## 2018-07-24 PROCEDURE — 80074 ACUTE HEPATITIS PANEL: CPT | Performed by: INTERNAL MEDICINE

## 2018-07-24 PROCEDURE — 86038 ANTINUCLEAR ANTIBODIES: CPT | Performed by: INTERNAL MEDICINE

## 2018-07-24 PROCEDURE — 84165 PROTEIN E-PHORESIS SERUM: CPT | Performed by: INTERNAL MEDICINE

## 2018-07-24 PROCEDURE — 86200 CCP ANTIBODY: CPT | Performed by: INTERNAL MEDICINE

## 2018-07-24 PROCEDURE — 85732 THROMBOPLASTIN TIME PARTIAL: CPT | Performed by: INTERNAL MEDICINE

## 2018-07-24 PROCEDURE — 86812 HLA TYPING A B OR C: CPT | Performed by: INTERNAL MEDICINE

## 2018-07-24 PROCEDURE — 86147 CARDIOLIPIN ANTIBODY EA IG: CPT | Performed by: INTERNAL MEDICINE

## 2018-07-24 PROCEDURE — 86146 BETA-2 GLYCOPROTEIN ANTIBODY: CPT | Performed by: INTERNAL MEDICINE

## 2018-07-24 PROCEDURE — 86255 FLUORESCENT ANTIBODY SCREEN: CPT | Performed by: INTERNAL MEDICINE

## 2018-07-24 PROCEDURE — 83876 ASSAY MYELOPEROXIDASE: CPT | Performed by: INTERNAL MEDICINE

## 2018-07-24 PROCEDURE — 83883 ASSAY NEPHELOMETRY NOT SPEC: CPT | Performed by: INTERNAL MEDICINE

## 2018-09-12 DIAGNOSIS — I82.409 DVT (DEEP VENOUS THROMBOSIS) (HCC): Primary | ICD-10-CM

## 2018-09-12 PROCEDURE — 86235 NUCLEAR ANTIGEN ANTIBODY: CPT | Performed by: INTERNAL MEDICINE

## 2018-09-12 PROCEDURE — 82330 ASSAY OF CALCIUM: CPT | Performed by: INTERNAL MEDICINE

## 2018-09-18 PROBLEM — M11.20 CHONDROCALCINOSIS: Status: ACTIVE | Noted: 2018-09-18

## 2018-09-19 ENCOUNTER — HOSPITAL ENCOUNTER (OUTPATIENT)
Dept: INTERVENTIONAL RADIOLOGY/VASCULAR | Facility: HOSPITAL | Age: 73
Discharge: HOME | End: 2018-09-19
Attending: INTERNAL MEDICINE | Admitting: INTERNAL MEDICINE
Payer: MEDICARE

## 2018-09-19 VITALS
DIASTOLIC BLOOD PRESSURE: 74 MMHG | RESPIRATION RATE: 16 BRPM | HEART RATE: 83 BPM | OXYGEN SATURATION: 97 % | SYSTOLIC BLOOD PRESSURE: 135 MMHG

## 2018-09-19 DIAGNOSIS — I26.99 PULMONARY EMBOLISM WITHOUT ACUTE COR PULMONALE (HCC): ICD-10-CM

## 2018-09-19 DIAGNOSIS — Z86.718 HISTORY OF RECURRENT DEEP VEIN THROMBOSIS (DVT): ICD-10-CM

## 2018-09-19 LAB
GLUCOSE BLD-MCNC: 130 MG/DL (ref 65–99)
INR: 1 (ref 0.8–1.3)

## 2018-09-19 PROCEDURE — 37193 REM ENDOVAS VENA CAVA FILTER: CPT

## 2018-09-19 PROCEDURE — 82962 GLUCOSE BLOOD TEST: CPT

## 2018-09-19 PROCEDURE — 06PY3DZ REMOVAL OF INTRALUMINAL DEVICE FROM LOWER VEIN, PERCUTANEOUS APPROACH: ICD-10-PCS | Performed by: RADIOLOGY

## 2018-09-19 PROCEDURE — 99152 MOD SED SAME PHYS/QHP 5/>YRS: CPT

## 2018-09-19 RX ORDER — SODIUM CHLORIDE 9 MG/ML
INJECTION, SOLUTION INTRAVENOUS CONTINUOUS
Status: DISCONTINUED | OUTPATIENT
Start: 2018-09-19 | End: 2018-09-19

## 2018-09-19 RX ORDER — HEPARIN SODIUM 5000 [USP'U]/ML
INJECTION, SOLUTION INTRAVENOUS; SUBCUTANEOUS
Status: COMPLETED
Start: 2018-09-19 | End: 2018-09-19

## 2018-09-19 RX ORDER — MIDAZOLAM HYDROCHLORIDE 1 MG/ML
INJECTION INTRAMUSCULAR; INTRAVENOUS
Status: COMPLETED
Start: 2018-09-19 | End: 2018-09-19

## 2018-09-19 RX ORDER — LIDOCAINE HYDROCHLORIDE 10 MG/ML
INJECTION, SOLUTION EPIDURAL; INFILTRATION; INTRACAUDAL; PERINEURAL
Status: COMPLETED
Start: 2018-09-19 | End: 2018-09-19

## 2018-09-19 RX ADMIN — SODIUM CHLORIDE: 9 INJECTION, SOLUTION INTRAVENOUS at 10:30:00

## 2018-09-19 NOTE — H&P
[facility]   History & Physical    Uyen Mean Patient Status:  Outpatient in a Bed    7/15/1945 MRN [id number]   Location [address] [facility] Attending Jolanda Schilder, [address] [facility] Day # 0 PCP Louie Crum MD     Admitting POSSIBLE BIOPSY, POSSIBLE POLYPECTOMY 35154,37337;                 Surgeon: Karen Blackmon MD;  Location: [address] [place] [address],[address]  3/5/2013: ESOPHAGOGASTRODUODENOSCOPY, COLONOSCOPY, POSSIBLE BIOPSY,   POSSIBLE POLYPECTOMY 39 PERFORMED      Comment:  Normal gastric/duodenal Bx  3/5/2013: UPPER GI ENDOSCOPY,BIOPSY      Comment:  Procedure: ESOPHAGOGASTRODUODENOSCOPY, COLONOSCOPY,                POSSIBLE BIOPSY, POSSIBLE POLYPECTOMY 50348,29957;                 Surgeon: Nancy Michelle including risks, benefits and side effects related to the alternatives, and risks related to not receiving this procedure.       Recommendations: IVC Filter retrieval    Darrin Nascimento MD  9/19/2018  10:55 AM

## 2018-09-19 NOTE — PROGRESS NOTES
Rc'd pt from IR in stable condition. VSS. Manual dressing to right neck is soft, clean and dry. No bleeding or hematoma. Pt sleepy, but easy to arouse. Denies c/o pain or discomfort. 13:30: Pt awake and eating, tolerating well.  Dressing to right neck

## 2018-09-19 NOTE — PROCEDURES
[facility]  Procedure Note    Betsey Edmondson Patient Status:  Outpatient in a Bed    7/15/1945 MRN [id number]   Location [address] [facility] Attending Geovanna Del Rosario MD   Hosp Day # 0 PCP Isa Beck MD     Procedure: IVC

## 2018-11-05 PROCEDURE — 82330 ASSAY OF CALCIUM: CPT | Performed by: INTERNAL MEDICINE

## 2018-12-12 ENCOUNTER — HOSPITAL (OUTPATIENT)
Dept: OTHER | Age: 73
End: 2018-12-12
Attending: INTERNAL MEDICINE

## 2018-12-12 LAB
GLUCOSE BLDC GLUCOMTR-MCNC: 49 MG/DL (ref 65–99)
GLUCOSE BLDC GLUCOMTR-MCNC: 49 MG/DL (ref 65–99)
GLUCOSE BLDC GLUCOMTR-MCNC: 54 MG/DL (ref 65–99)
GLUCOSE BLDC GLUCOMTR-MCNC: 54 MG/DL (ref 65–99)
GLUCOSE BLDC GLUCOMTR-MCNC: 81 MG/DL (ref 65–99)
GLUCOSE BLDC GLUCOMTR-MCNC: 81 MG/DL (ref 65–99)
GLUCOSE BLDC GLUCOMTR-MCNC: ABNORMAL MG/DL
GLUCOSE BLDC GLUCOMTR-MCNC: NORMAL MG/DL
GLUCOSE BLDC GLUCOMTR-MCNC: NORMAL MG/DL

## 2018-12-13 ENCOUNTER — DIAGNOSTIC TRANS (OUTPATIENT)
Dept: OTHER | Age: 73
End: 2018-12-13

## 2018-12-13 LAB — PATHOLOGIST NAME: NORMAL

## 2018-12-18 PROBLEM — I73.9 PAD (PERIPHERAL ARTERY DISEASE): Status: ACTIVE | Noted: 2018-12-18

## 2018-12-18 PROBLEM — I73.9 PAD (PERIPHERAL ARTERY DISEASE) (HCC): Status: ACTIVE | Noted: 2018-12-18

## 2019-01-24 PROCEDURE — 87045 FECES CULTURE AEROBIC BACT: CPT | Performed by: INTERNAL MEDICINE

## 2019-01-24 PROCEDURE — 87329 GIARDIA AG IA: CPT | Performed by: INTERNAL MEDICINE

## 2019-01-24 PROCEDURE — 87046 STOOL CULTR AEROBIC BACT EA: CPT | Performed by: INTERNAL MEDICINE

## 2019-01-24 PROCEDURE — 87493 C DIFF AMPLIFIED PROBE: CPT | Performed by: INTERNAL MEDICINE

## 2019-01-24 PROCEDURE — 87272 CRYPTOSPORIDIUM AG IF: CPT | Performed by: INTERNAL MEDICINE

## 2019-01-24 PROCEDURE — 87427 SHIGA-LIKE TOXIN AG IA: CPT | Performed by: INTERNAL MEDICINE

## 2019-01-24 PROCEDURE — 87177 OVA AND PARASITES SMEARS: CPT | Performed by: INTERNAL MEDICINE

## 2019-01-24 PROCEDURE — 87209 SMEAR COMPLEX STAIN: CPT | Performed by: INTERNAL MEDICINE

## 2019-03-11 PROCEDURE — 88305 TISSUE EXAM BY PATHOLOGIST: CPT | Performed by: INTERNAL MEDICINE

## 2019-03-19 PROBLEM — N18.30 CKD (CHRONIC KIDNEY DISEASE) STAGE 3, GFR 30-59 ML/MIN (HCC): Status: ACTIVE | Noted: 2019-03-19

## 2019-05-29 PROBLEM — I48.0 PAROXYSMAL ATRIAL FIBRILLATION (HCC): Status: ACTIVE | Noted: 2019-05-29

## 2019-05-30 PROCEDURE — 81003 URINALYSIS AUTO W/O SCOPE: CPT | Performed by: INTERNAL MEDICINE

## 2019-05-31 PROBLEM — I48.0 PAROXYSMAL ATRIAL FIBRILLATION (HCC): Status: RESOLVED | Noted: 2019-05-29 | Resolved: 2019-05-31

## 2019-06-03 PROCEDURE — 83010 ASSAY OF HAPTOGLOBIN QUANT: CPT | Performed by: INTERNAL MEDICINE

## 2019-06-05 ENCOUNTER — HOSPITAL (OUTPATIENT)
Dept: OTHER | Age: 74
End: 2019-06-05

## 2019-06-05 LAB
ANALYZER ANC (IANC): ABNORMAL
ANION GAP SERPL CALC-SCNC: 14 MMOL/L (ref 10–20)
BASOPHILS # BLD: 0 K/MCL (ref 0–0.3)
BASOPHILS NFR BLD: 0 %
BUN SERPL-MCNC: 25 MG/DL (ref 6–20)
BUN/CREAT SERPL: 12 (ref 7–25)
CALCIUM SERPL-MCNC: 9.1 MG/DL (ref 8.4–10.2)
CHLORIDE SERPL-SCNC: 104 MMOL/L (ref 98–107)
CO2 SERPL-SCNC: 22 MMOL/L (ref 21–32)
CREAT SERPL-MCNC: 2.12 MG/DL (ref 0.67–1.17)
D DIMER PPP FEU-MCNC: 0.31 MG/L (FEU)
D DIMER PPP FEU-MCNC: NORMAL
DIFFERENTIAL METHOD BLD: ABNORMAL
EOSINOPHIL # BLD: 0 K/MCL (ref 0.1–0.5)
EOSINOPHIL NFR BLD: 0 %
ERYTHROCYTE [DISTWIDTH] IN BLOOD: 15.9 % (ref 11–15)
GLUCOSE SERPL-MCNC: 208 MG/DL (ref 65–99)
HCT VFR BLD CALC: 28.3 % (ref 39–51)
HGB BLD-MCNC: 8.7 G/DL (ref 13–17)
IMM GRANULOCYTES # BLD AUTO: 0.1 K/MCL (ref 0–0.2)
IMM GRANULOCYTES NFR BLD: 1 %
LYMPHOCYTES # BLD: 0.7 K/MCL (ref 1–4)
LYMPHOCYTES NFR BLD: 10 %
MAGNESIUM SERPL-MCNC: 1.9 MG/DL (ref 1.7–2.4)
MCH RBC QN AUTO: 24.8 PG (ref 26–34)
MCHC RBC AUTO-ENTMCNC: 30.7 G/DL (ref 32–36.5)
MCV RBC AUTO: 80.6 FL (ref 78–100)
MONOCYTES # BLD: 0.6 K/MCL (ref 0.3–0.9)
MONOCYTES NFR BLD: 9 %
NEUTROPHILS # BLD: 5.3 K/MCL (ref 1.8–7.7)
NEUTROPHILS NFR BLD: 80 %
NEUTS SEG NFR BLD: ABNORMAL %
NRBC (NRBCRE): 0 /100 WBC
PLATELET # BLD: 146 K/MCL (ref 140–450)
POTASSIUM SERPL-SCNC: 4.3 MMOL/L (ref 3.4–5.1)
RBC # BLD: 3.51 MIL/MCL (ref 4.5–5.9)
SODIUM SERPL-SCNC: 136 MMOL/L (ref 135–145)
TROPONIN I SERPL HS-MCNC: <0.02 NG/ML
WBC # BLD: 6.6 K/MCL (ref 4.2–11)

## 2019-06-20 PROBLEM — D50.0 IRON DEFICIENCY ANEMIA DUE TO CHRONIC BLOOD LOSS: Status: ACTIVE | Noted: 2019-06-20

## 2019-06-21 PROBLEM — D50.9 IRON DEFICIENCY ANEMIA: Status: ACTIVE | Noted: 2019-06-21

## 2019-06-25 ENCOUNTER — HOSPITAL (OUTPATIENT)
Dept: OTHER | Age: 74
End: 2019-06-25
Attending: INTERNAL MEDICINE

## 2019-06-26 LAB — PATHOLOGIST NAME: NORMAL

## 2019-08-02 PROBLEM — G95.9 DISEASE OF SPINAL CORD (HCC): Status: ACTIVE | Noted: 2019-08-02

## 2020-01-18 ENCOUNTER — HOSPITAL (OUTPATIENT)
Dept: OTHER | Age: 75
End: 2020-01-18

## 2020-01-18 ENCOUNTER — DIAGNOSTIC TRANS (OUTPATIENT)
Dept: OTHER | Age: 75
End: 2020-01-18

## 2020-01-18 LAB
ANALYZER ANC (IANC): ABNORMAL
ANION GAP SERPL CALC-SCNC: 13 MMOL/L (ref 10–20)
BASOPHILS # BLD: 0 K/MCL (ref 0–0.3)
BASOPHILS NFR BLD: 0 %
BUN SERPL-MCNC: 20 MG/DL (ref 6–20)
BUN/CREAT SERPL: 12 (ref 7–25)
CALCIUM SERPL-MCNC: 9.3 MG/DL (ref 8.4–10.2)
CHLORIDE SERPL-SCNC: 105 MMOL/L (ref 98–107)
CO2 SERPL-SCNC: 22 MMOL/L (ref 21–32)
CREAT SERPL-MCNC: 1.67 MG/DL (ref 0.67–1.17)
D DIMER PPP FEU-MCNC: 0.27 MG/L (FEU)
D DIMER PPP FEU-MCNC: NORMAL
DIFFERENTIAL METHOD BLD: ABNORMAL
EOSINOPHIL # BLD: 0 K/MCL (ref 0.1–0.5)
EOSINOPHIL NFR BLD: 0 %
ERYTHROCYTE [DISTWIDTH] IN BLOOD: 13.2 % (ref 11–15)
GLUCOSE BLDC GLUCOMTR-MCNC: 159 MG/DL (ref 70–99)
GLUCOSE BLDC GLUCOMTR-MCNC: ABNORMAL MG/DL
GLUCOSE SERPL-MCNC: 169 MG/DL (ref 65–99)
HCT VFR BLD CALC: 34.8 % (ref 39–51)
HGB BLD-MCNC: 11.7 G/DL (ref 13–17)
IMM GRANULOCYTES # BLD AUTO: 0.1 K/MCL (ref 0–0.2)
IMM GRANULOCYTES NFR BLD: 1 %
LYMPHOCYTES # BLD: 0.7 K/MCL (ref 1–4)
LYMPHOCYTES NFR BLD: 13 %
MCH RBC QN AUTO: 31.6 PG (ref 26–34)
MCHC RBC AUTO-ENTMCNC: 33.6 G/DL (ref 32–36.5)
MCV RBC AUTO: 94.1 FL (ref 78–100)
MONOCYTES # BLD: 0.5 K/MCL (ref 0.3–0.9)
MONOCYTES NFR BLD: 9 %
NEUTROPHILS # BLD: 4.4 K/MCL (ref 1.8–7.7)
NEUTROPHILS NFR BLD: 77 %
NEUTS SEG NFR BLD: ABNORMAL %
NRBC (NRBCRE): 0 /100 WBC
NT-PROBNP SERPL-MCNC: 130 PG/ML
PLATELET # BLD: 121 K/MCL (ref 140–450)
POTASSIUM SERPL-SCNC: 4.2 MMOL/L (ref 3.4–5.1)
RBC # BLD: 3.7 MIL/MCL (ref 4.5–5.9)
SODIUM SERPL-SCNC: 136 MMOL/L (ref 135–145)
TROPONIN I SERPL HS-MCNC: <0.02 NG/ML
WBC # BLD: 5.7 K/MCL (ref 4.2–11)

## 2020-01-23 PROBLEM — Z15.89 HLA B27 (HLA B27 POSITIVE): Status: ACTIVE | Noted: 2020-01-23

## 2020-06-16 PROBLEM — E11.40 DIABETIC NEUROPATHY WITH NEUROLOGIC COMPLICATION (HCC): Status: ACTIVE | Noted: 2020-06-16

## 2020-06-16 PROBLEM — G31.9 BRAIN ATROPHY: Status: ACTIVE | Noted: 2020-06-16

## 2020-06-16 PROBLEM — E11.49 DIABETIC NEUROPATHY WITH NEUROLOGIC COMPLICATION (HCC): Status: ACTIVE | Noted: 2020-06-16

## 2020-06-16 PROBLEM — G31.9 BRAIN ATROPHY (HCC): Status: ACTIVE | Noted: 2020-06-16

## 2021-01-13 PROBLEM — M79.671 FOOT PAIN, RIGHT: Status: ACTIVE | Noted: 2021-01-13

## 2021-02-08 PROBLEM — M79.671 RIGHT FOOT PAIN: Status: ACTIVE | Noted: 2021-01-13

## 2021-03-15 DIAGNOSIS — Z23 NEED FOR VACCINATION: ICD-10-CM

## 2021-03-16 PROBLEM — M17.0 PRIMARY OSTEOARTHRITIS OF BOTH KNEES: Status: ACTIVE | Noted: 2021-03-16

## 2021-04-11 DIAGNOSIS — Z23 NEED FOR VACCINATION: ICD-10-CM

## 2021-08-16 PROBLEM — Z79.899 IMMUNOCOMPROMISED STATE DUE TO DRUG THERAPY (HCC): Status: ACTIVE | Noted: 2021-08-16

## 2021-08-16 PROBLEM — G31.9 BRAIN ATROPHY (HCC): Status: RESOLVED | Noted: 2020-06-16 | Resolved: 2021-08-16

## 2021-08-16 PROBLEM — I65.23 ATHEROSCLEROSIS OF BOTH CAROTID ARTERIES: Status: ACTIVE | Noted: 2018-06-22

## 2021-08-16 PROBLEM — G31.9 BRAIN ATROPHY: Status: RESOLVED | Noted: 2020-06-16 | Resolved: 2021-08-16

## 2021-08-16 PROBLEM — Z79.899 IMMUNOCOMPROMISED STATE DUE TO DRUG THERAPY  (HCC): Status: ACTIVE | Noted: 2021-08-16

## 2021-08-16 PROBLEM — Z79.899 IMMUNOCOMPROMISED STATE DUE TO DRUG THERAPY: Status: ACTIVE | Noted: 2021-08-16

## 2021-08-16 PROBLEM — D84.821 IMMUNOCOMPROMISED STATE DUE TO DRUG THERAPY (HCC): Status: ACTIVE | Noted: 2021-08-16

## 2021-08-16 PROBLEM — G95.9 DISEASE OF SPINAL CORD (HCC): Status: RESOLVED | Noted: 2019-08-02 | Resolved: 2021-08-16

## 2021-08-16 PROBLEM — D84.821 IMMUNOCOMPROMISED STATE DUE TO DRUG THERAPY  (HCC): Status: ACTIVE | Noted: 2021-08-16

## 2021-08-16 PROBLEM — D84.821 IMMUNOCOMPROMISED STATE DUE TO DRUG THERAPY: Status: ACTIVE | Noted: 2021-08-16

## 2021-12-28 ENCOUNTER — OFFICE VISIT (OUTPATIENT)
Dept: PODIATRY CLINIC | Facility: CLINIC | Age: 76
End: 2021-12-28
Payer: COMMERCIAL

## 2021-12-28 DIAGNOSIS — L84 CALLUS OF FOOT: ICD-10-CM

## 2021-12-28 DIAGNOSIS — M21.6X1 PLANTAR FLEXED METATARSAL BONE OF RIGHT FOOT: ICD-10-CM

## 2021-12-28 DIAGNOSIS — Q72.891 BRACHYMETATARSIA OF RIGHT FOOT: Primary | ICD-10-CM

## 2021-12-28 DIAGNOSIS — M77.41 METATARSALGIA OF RIGHT FOOT: ICD-10-CM

## 2021-12-28 PROCEDURE — 99203 OFFICE O/P NEW LOW 30 MIN: CPT | Performed by: PODIATRIST

## 2021-12-28 RX ORDER — GABAPENTIN 300 MG/1
300 CAPSULE ORAL
COMMUNITY

## 2021-12-30 ENCOUNTER — TELEPHONE (OUTPATIENT)
Dept: PODIATRY CLINIC | Facility: CLINIC | Age: 76
End: 2021-12-30

## 2021-12-30 DIAGNOSIS — M77.41 METATARSALGIA OF RIGHT FOOT: ICD-10-CM

## 2021-12-30 DIAGNOSIS — Q72.891 BRACHYMETATARSIA OF RIGHT FOOT: Primary | ICD-10-CM

## 2021-12-30 DIAGNOSIS — L84 CALLUS OF FOOT: ICD-10-CM

## 2021-12-30 DIAGNOSIS — M21.6X1 PLANTAR FLEXED METATARSAL BONE OF RIGHT FOOT: ICD-10-CM

## 2021-12-30 NOTE — TELEPHONE ENCOUNTER
Called patient this date and discussed scheduling surgery. He is thinking to do this in April and requests a call back in about a month.

## 2021-12-30 NOTE — PROGRESS NOTES
Umberto Chacon is a 77year old male. Patient presents with:  Consult: Pt is here for DM last A1C 5.4 on 12/15/21, and also for callus under foot, pt states he is having pain with walking. rates pain 4/10 at this time.         HPI:   This pleasant gentleman p bilateral legs- different times though   • Gastritis 3/13    H pylori negative   • GOUT    • Gout    • High blood pressure    • High cholesterol    • HLA B27 (HLA B27 positive)    • HYPERLIPIDEMIA    • HYPERTENSION    • Neuropathy     shoulders arms and in ORDER FOR IV ANTIBIOTIC SURGICAL SITE INFECTION PROPHYLAXIS.  3/5/2013    Procedure: ESOPHAGOGASTRODUODENOSCOPY, COLONOSCOPY, POSSIBLE BIOPSY, POSSIBLE POLYPECTOMY 22310,48701;  Surgeon: Bre Rankin MD;  Location: [facility] pulses but weakened posterior tibial with good cap return to the pedal digits   3. Neurologic: Patient has intact sensorium   4.  Musculoskeletal: Patient has a brachiyametatarsis of the right foot and evidently this joint must be dislocated somewhat becsavi permanent numbness around the surgical site, guarantees or assurances were not offered. Questions were invited and answered to the best of my ability.   At this time the patient wished to proceed with the surgical procedure and we will try to get that sche

## 2021-12-30 NOTE — TELEPHONE ENCOUNTER
Procedure: Partial fourth metatarsal head resection right foot  CPT code: 94206  Length of Surgery: 45 minutes  Any Instruments: Mini power, mini fluoroscopy  Call patient: within 24 hours  Anesthesia: MAC  Location: [facility]  Assistance: none  Pacemaker: No

## 2021-12-30 NOTE — TELEPHONE ENCOUNTER
Procedure: Partial fourth metatarsal head resection, right foot  CPT code: 31015  Length of Surgery: 45-minute  Any Instruments: Mini power, mini fluoroscopy  Call patient: within 24 hours  Anesthesia: MAC  Location: [facility]  Assistance: none  Pacemaker: No

## 2022-01-03 NOTE — TELEPHONE ENCOUNTER
Called patient on 12/30/21 and he is thinking of surgery in a few months. Requests a call back end of January/beginning of February.

## 2022-01-10 ENCOUNTER — TELEPHONE (OUTPATIENT)
Dept: PODIATRY CLINIC | Facility: CLINIC | Age: 77
End: 2022-01-10

## 2022-01-10 NOTE — TELEPHONE ENCOUNTER
Patient had a callus filed and is feeling a shooting pain on occasion. Wondering if this could be related to the callus treatment and/or if there is a cream that could possibly help with the pain.

## 2022-01-11 NOTE — TELEPHONE ENCOUNTER
S/w pt and he states on 1/9 and 1/10 morning he had pain in the right foot, like a spasm sensation on the calluses area. He denies any injury, numbness tingling or swelling.  He states that this issue has resolved itself and didn't have any pain last night

## 2022-03-01 ENCOUNTER — OFFICE VISIT (OUTPATIENT)
Dept: PODIATRY CLINIC | Facility: CLINIC | Age: 77
End: 2022-03-01
Payer: COMMERCIAL

## 2022-03-01 DIAGNOSIS — L60.0 OC (ONYCHOCRYPTOSIS): ICD-10-CM

## 2022-03-01 DIAGNOSIS — M21.6X1 PLANTAR FLEXED METATARSAL BONE OF RIGHT FOOT: ICD-10-CM

## 2022-03-01 DIAGNOSIS — N18.30 STAGE 3 CHRONIC KIDNEY DISEASE, UNSPECIFIED WHETHER STAGE 3A OR 3B CKD (HCC): ICD-10-CM

## 2022-03-01 DIAGNOSIS — L84 CALLUS OF FOOT: ICD-10-CM

## 2022-03-01 DIAGNOSIS — B35.1 OM (ONYCHOMYCOSIS): ICD-10-CM

## 2022-03-01 DIAGNOSIS — M77.41 METATARSALGIA OF RIGHT FOOT: ICD-10-CM

## 2022-03-01 DIAGNOSIS — M79.671 RIGHT FOOT PAIN: ICD-10-CM

## 2022-03-01 DIAGNOSIS — E11.49 DIABETIC NEUROPATHY WITH NEUROLOGIC COMPLICATION (HCC): ICD-10-CM

## 2022-03-01 DIAGNOSIS — Q72.891 BRACHYMETATARSIA OF RIGHT FOOT: ICD-10-CM

## 2022-03-01 DIAGNOSIS — M72.2 PLANTAR FASCIITIS, BILATERAL: ICD-10-CM

## 2022-03-01 DIAGNOSIS — E11.40 DIABETIC NEUROPATHY WITH NEUROLOGIC COMPLICATION (HCC): ICD-10-CM

## 2022-03-01 PROCEDURE — 11721 DEBRIDE NAIL 6 OR MORE: CPT | Performed by: PODIATRIST

## 2022-05-03 ENCOUNTER — OFFICE VISIT (OUTPATIENT)
Dept: PODIATRY CLINIC | Facility: CLINIC | Age: 77
End: 2022-05-03
Payer: COMMERCIAL

## 2022-05-03 DIAGNOSIS — M77.41 METATARSALGIA OF RIGHT FOOT: ICD-10-CM

## 2022-05-03 DIAGNOSIS — L84 CALLUS OF FOOT: ICD-10-CM

## 2022-05-03 DIAGNOSIS — N18.30 STAGE 3 CHRONIC KIDNEY DISEASE, UNSPECIFIED WHETHER STAGE 3A OR 3B CKD (HCC): ICD-10-CM

## 2022-05-03 DIAGNOSIS — E11.49 DIABETIC NEUROPATHY WITH NEUROLOGIC COMPLICATION (HCC): ICD-10-CM

## 2022-05-03 DIAGNOSIS — M21.6X1 PLANTAR FLEXED METATARSAL BONE OF RIGHT FOOT: ICD-10-CM

## 2022-05-03 DIAGNOSIS — E11.40 DIABETIC NEUROPATHY WITH NEUROLOGIC COMPLICATION (HCC): ICD-10-CM

## 2022-05-03 DIAGNOSIS — L60.0 OC (ONYCHOCRYPTOSIS): ICD-10-CM

## 2022-05-03 DIAGNOSIS — B35.1 OM (ONYCHOMYCOSIS): ICD-10-CM

## 2022-05-03 PROCEDURE — 99213 OFFICE O/P EST LOW 20 MIN: CPT | Performed by: PODIATRIST

## 2022-07-05 ENCOUNTER — OFFICE VISIT (OUTPATIENT)
Dept: PODIATRY CLINIC | Facility: CLINIC | Age: 77
End: 2022-07-05
Payer: COMMERCIAL

## 2022-07-05 DIAGNOSIS — L60.0 OC (ONYCHOCRYPTOSIS): ICD-10-CM

## 2022-07-05 DIAGNOSIS — Q72.891 BRACHYMETATARSIA OF RIGHT FOOT: ICD-10-CM

## 2022-07-05 DIAGNOSIS — M79.671 RIGHT FOOT PAIN: ICD-10-CM

## 2022-07-05 DIAGNOSIS — M77.41 METATARSALGIA OF RIGHT FOOT: ICD-10-CM

## 2022-07-05 DIAGNOSIS — N18.30 STAGE 3 CHRONIC KIDNEY DISEASE, UNSPECIFIED WHETHER STAGE 3A OR 3B CKD (HCC): Primary | ICD-10-CM

## 2022-07-05 DIAGNOSIS — M21.6X1 PLANTAR FLEXED METATARSAL BONE OF RIGHT FOOT: ICD-10-CM

## 2022-07-05 DIAGNOSIS — L84 CALLUS OF FOOT: ICD-10-CM

## 2022-07-05 DIAGNOSIS — B35.1 OM (ONYCHOMYCOSIS): ICD-10-CM

## 2022-07-05 PROCEDURE — 11055 PARING/CUTG B9 HYPRKER LES 1: CPT | Performed by: PODIATRIST

## 2022-07-05 PROCEDURE — 11721 DEBRIDE NAIL 6 OR MORE: CPT | Performed by: PODIATRIST

## 2022-07-14 ENCOUNTER — TELEPHONE (OUTPATIENT)
Dept: PODIATRY CLINIC | Facility: CLINIC | Age: 77
End: 2022-07-14

## 2022-07-14 ENCOUNTER — OFFICE VISIT (OUTPATIENT)
Dept: PODIATRY CLINIC | Facility: CLINIC | Age: 77
End: 2022-07-14
Payer: COMMERCIAL

## 2022-07-14 DIAGNOSIS — L97.511 ULCER OF FOOT, RIGHT, LIMITED TO BREAKDOWN OF SKIN (HCC): Primary | ICD-10-CM

## 2022-07-14 DIAGNOSIS — N18.30 STAGE 3 CHRONIC KIDNEY DISEASE, UNSPECIFIED WHETHER STAGE 3A OR 3B CKD (HCC): ICD-10-CM

## 2022-07-14 DIAGNOSIS — L03.119 CELLULITIS OF FOOT: ICD-10-CM

## 2022-07-14 DIAGNOSIS — L84 CALLUS OF FOOT: ICD-10-CM

## 2022-07-14 PROCEDURE — 1125F AMNT PAIN NOTED PAIN PRSNT: CPT | Performed by: PODIATRIST

## 2022-07-14 PROCEDURE — 99213 OFFICE O/P EST LOW 20 MIN: CPT | Performed by: PODIATRIST

## 2022-07-14 PROCEDURE — L3260 AMBULATORY SURGICAL BOOT EAC: HCPCS | Performed by: PODIATRIST

## 2022-07-14 RX ORDER — AMOXICILLIN AND CLAVULANATE POTASSIUM 875; 125 MG/1; MG/1
1 TABLET, FILM COATED ORAL 2 TIMES DAILY
Qty: 14 TABLET | Refills: 0 | Status: SHIPPED | OUTPATIENT
Start: 2022-07-14

## 2022-07-14 NOTE — TELEPHONE ENCOUNTER
I spoke with patient. He underwent nail trim and callus trim on 7/5. He is now experiencing generalized swelling of foot and increased pain of toe where callus was debrided. Denies open wounds, bleeding or drainage. Skin is cool and dry. Difficulty stepping down on foot. Acute follow up booked today. Dr. Shadia Roman.

## 2022-07-14 NOTE — TELEPHONE ENCOUNTER
Pt called stating pt had an appointment on 7-5-22. Pt's right foot is now swollen and sore.   Please call pt

## 2022-07-16 ENCOUNTER — TELEPHONE (OUTPATIENT)
Dept: PODIATRY CLINIC | Facility: CLINIC | Age: 77
End: 2022-07-16

## 2022-07-16 RX ORDER — SULFAMETHOXAZOLE AND TRIMETHOPRIM 800; 160 MG/1; MG/1
1 TABLET ORAL 2 TIMES DAILY
Qty: 20 TABLET | Refills: 0 | Status: SHIPPED | OUTPATIENT
Start: 2022-07-16

## 2022-07-20 ENCOUNTER — OFFICE VISIT (OUTPATIENT)
Dept: PODIATRY CLINIC | Facility: CLINIC | Age: 77
End: 2022-07-20
Payer: COMMERCIAL

## 2022-07-20 DIAGNOSIS — L97.511 ULCER OF FOOT, RIGHT, LIMITED TO BREAKDOWN OF SKIN (HCC): Primary | ICD-10-CM

## 2022-07-20 DIAGNOSIS — L03.119 CELLULITIS OF FOOT: ICD-10-CM

## 2022-07-20 PROCEDURE — 1125F AMNT PAIN NOTED PAIN PRSNT: CPT | Performed by: PODIATRIST

## 2022-07-20 PROCEDURE — 99213 OFFICE O/P EST LOW 20 MIN: CPT | Performed by: PODIATRIST

## 2022-07-27 ENCOUNTER — OFFICE VISIT (OUTPATIENT)
Dept: PODIATRY CLINIC | Facility: CLINIC | Age: 77
End: 2022-07-27
Payer: COMMERCIAL

## 2022-07-27 DIAGNOSIS — L97.511 ULCER OF FOOT, RIGHT, LIMITED TO BREAKDOWN OF SKIN (HCC): ICD-10-CM

## 2022-07-27 DIAGNOSIS — M71.071 ABSCESS OF BURSA OF RIGHT FOOT: Primary | ICD-10-CM

## 2022-07-27 DIAGNOSIS — M86.9 OSTEOMYELITIS OF METATARSAL (HCC): ICD-10-CM

## 2022-07-27 PROCEDURE — 99213 OFFICE O/P EST LOW 20 MIN: CPT | Performed by: PODIATRIST

## 2022-07-27 RX ORDER — ASPIRIN 81 MG/1
81 TABLET ORAL DAILY
COMMUNITY
Start: 2022-07-12

## 2022-07-27 RX ORDER — VIT A/VIT C/VIT E/ZINC/COPPER 2148-113
TABLET ORAL
COMMUNITY

## 2022-07-27 RX ORDER — GLIPIZIDE 2.5 MG/1
TABLET, EXTENDED RELEASE ORAL
COMMUNITY
Start: 2022-06-28

## 2022-07-28 ENCOUNTER — TELEPHONE (OUTPATIENT)
Dept: PODIATRY CLINIC | Facility: CLINIC | Age: 77
End: 2022-07-28

## 2022-07-28 NOTE — TELEPHONE ENCOUNTER
Patient states doctor told him to follow up 4-5 days after MRI which is scheduled for 08/03, Next available is 08/15 and patient refused.  Please advise

## 2022-08-03 ENCOUNTER — HOSPITAL ENCOUNTER (OUTPATIENT)
Dept: MRI IMAGING | Age: 77
Discharge: HOME | End: 2022-08-03
Attending: PODIATRIST
Payer: MEDICARE

## 2022-08-03 DIAGNOSIS — L97.511 ULCER OF FOOT, RIGHT, LIMITED TO BREAKDOWN OF SKIN (HCC): ICD-10-CM

## 2022-08-03 DIAGNOSIS — M71.071 ABSCESS OF BURSA OF RIGHT FOOT: ICD-10-CM

## 2022-08-03 DIAGNOSIS — M86.9 OSTEOMYELITIS OF METATARSAL (HCC): ICD-10-CM

## 2022-08-03 PROCEDURE — 73718 MRI LOWER EXTREMITY W/O DYE: CPT | Performed by: PODIATRIST

## 2022-08-04 ENCOUNTER — OFFICE VISIT (OUTPATIENT)
Dept: PODIATRY CLINIC | Facility: CLINIC | Age: 77
End: 2022-08-04
Payer: COMMERCIAL

## 2022-08-04 ENCOUNTER — TELEPHONE (OUTPATIENT)
Dept: PODIATRY CLINIC | Facility: CLINIC | Age: 77
End: 2022-08-04

## 2022-08-04 DIAGNOSIS — M86.9 OSTEOMYELITIS OF METATARSAL (HCC): ICD-10-CM

## 2022-08-04 DIAGNOSIS — N18.30 STAGE 3 CHRONIC KIDNEY DISEASE, UNSPECIFIED WHETHER STAGE 3A OR 3B CKD (HCC): ICD-10-CM

## 2022-08-04 DIAGNOSIS — L97.511 ULCER OF FOOT, RIGHT, LIMITED TO BREAKDOWN OF SKIN (HCC): ICD-10-CM

## 2022-08-04 DIAGNOSIS — M71.071 ABSCESS OF BURSA OF RIGHT FOOT: Primary | ICD-10-CM

## 2022-08-04 PROCEDURE — 99213 OFFICE O/P EST LOW 20 MIN: CPT | Performed by: PODIATRIST

## 2022-08-04 PROCEDURE — 1126F AMNT PAIN NOTED NONE PRSNT: CPT | Performed by: PODIATRIST

## 2022-08-04 NOTE — TELEPHONE ENCOUNTER
Called patient this date and informed him Dr. Danielle Finley requests that he go to [facility] on Saturday by noon to be admitted and his procedure will be scheduled for Sunday, 8/7/22. He also asked if it is okay to get a cortisone injection in his right knee tomorrow as scheduled. His orthopedic provider said it would be fine. Spoke with Dr. Danielle Finley and he indicated that would be fine. Patient voiced understanding and will contact us with any questions.

## 2022-08-05 ENCOUNTER — TELEPHONE (OUTPATIENT)
Dept: PODIATRY CLINIC | Facility: CLINIC | Age: 77
End: 2022-08-05

## 2022-08-05 NOTE — TELEPHONE ENCOUNTER
Oral calling from [facility] [employer], [facility]. states pt reach out to the for [facility] services asking for orders for pt so they can assist in [facility] needs Fax # [phone] please advise

## 2022-08-06 ENCOUNTER — HOSPITAL ENCOUNTER (INPATIENT)
Facility: HOSPITAL | Age: 77
LOS: 4 days | Discharge: HOME | End: 2022-08-10
Attending: INTERNAL MEDICINE | Admitting: INTERNAL MEDICINE
Payer: MEDICARE

## 2022-08-06 DIAGNOSIS — M86.9 OSTEOMYELITIS (HCC): ICD-10-CM

## 2022-08-06 LAB
GLUCOSE BLD-MCNC: 208 MG/DL (ref 70–99)
GLUCOSE BLD-MCNC: 268 MG/DL (ref 70–99)
SARS-COV-2 RNA RESP QL NAA+PROBE: NOT DETECTED

## 2022-08-06 PROCEDURE — 82962 GLUCOSE BLOOD TEST: CPT

## 2022-08-06 RX ORDER — LOSARTAN POTASSIUM 100 MG/1
100 TABLET ORAL DAILY
Status: DISCONTINUED | OUTPATIENT
Start: 2022-08-07 | End: 2022-08-10

## 2022-08-06 RX ORDER — ACETAMINOPHEN 325 MG/1
650 TABLET ORAL EVERY 6 HOURS PRN
Status: DISCONTINUED | OUTPATIENT
Start: 2022-08-06 | End: 2022-08-10

## 2022-08-06 RX ORDER — PREDNISONE 2.5 MG
2.5 TABLET ORAL DAILY
Status: DISCONTINUED | OUTPATIENT
Start: 2022-08-07 | End: 2022-08-10

## 2022-08-06 RX ORDER — ALLOPURINOL 100 MG/1
100 TABLET ORAL DAILY
Status: DISCONTINUED | OUTPATIENT
Start: 2022-08-07 | End: 2022-08-10

## 2022-08-06 RX ORDER — ROSUVASTATIN CALCIUM 5 MG/1
5 TABLET, COATED ORAL DAILY
Status: DISCONTINUED | OUTPATIENT
Start: 2022-08-07 | End: 2022-08-10

## 2022-08-06 RX ORDER — GABAPENTIN 300 MG/1
300 CAPSULE ORAL
Status: DISCONTINUED | OUTPATIENT
Start: 2022-08-06 | End: 2022-08-10

## 2022-08-06 RX ORDER — AMLODIPINE BESYLATE 5 MG/1
5 TABLET ORAL DAILY
Status: DISCONTINUED | OUTPATIENT
Start: 2022-08-07 | End: 2022-08-10

## 2022-08-06 RX ORDER — ALPRAZOLAM 0.25 MG/1
0.25 TABLET ORAL DAILY
Status: DISCONTINUED | OUTPATIENT
Start: 2022-08-07 | End: 2022-08-10

## 2022-08-06 NOTE — PLAN OF CARE
Pt admitted from [facility] office, Pt has Osteo of 4th Metatarsal of Rt foot, Pt is AAOX4, VSS, room air, IV SL, no wound on assessment, only thing visible is a small callus to the plantar surface of Rt foot below 4th toe. Glucose monitored and treated per orders, plan for NPO at MD, and surgery tomorrow. Pt doing well, all needs met, all safety measures in place, call light within reach, will CTM.

## 2022-08-07 ENCOUNTER — ANESTHESIA EVENT (OUTPATIENT)
Dept: SURGERY | Facility: HOSPITAL | Age: 77
End: 2022-08-07
Payer: MEDICARE

## 2022-08-07 ENCOUNTER — APPOINTMENT (OUTPATIENT)
Dept: GENERAL RADIOLOGY | Facility: HOSPITAL | Age: 77
End: 2022-08-07
Attending: PODIATRIST
Payer: MEDICARE

## 2022-08-07 ENCOUNTER — ANESTHESIA (OUTPATIENT)
Dept: SURGERY | Facility: HOSPITAL | Age: 77
End: 2022-08-07
Payer: MEDICARE

## 2022-08-07 PROBLEM — M86.9 OSTEOMYELITIS (HCC): Status: ACTIVE | Noted: 2022-08-07

## 2022-08-07 LAB
ANION GAP SERPL CALC-SCNC: 5 MMOL/L (ref 0–18)
ANTIBODY SCREEN: NEGATIVE
APTT PPP: 26.8 SECONDS (ref 23.3–35.6)
BASOPHILS # BLD AUTO: 0.01 X10(3) UL (ref 0–0.2)
BASOPHILS NFR BLD AUTO: 0.1 %
BUN BLD-MCNC: 25 MG/DL (ref 7–18)
CALCIUM BLD-MCNC: 9.9 MG/DL (ref 8.5–10.1)
CHLORIDE SERPL-SCNC: 109 MMOL/L (ref 98–112)
CO2 SERPL-SCNC: 25 MMOL/L (ref 21–32)
CREAT BLD-MCNC: 1.34 MG/DL
CRP SERPL-MCNC: <0.29 MG/DL (ref ?–0.3)
EOSINOPHIL # BLD AUTO: 0 X10(3) UL (ref 0–0.7)
EOSINOPHIL NFR BLD AUTO: 0 %
ERYTHROCYTE [DISTWIDTH] IN BLOOD BY AUTOMATED COUNT: 13.8 %
GFR SERPLBLD BASED ON 1.73 SQ M-ARVRAT: 55 ML/MIN/1.73M2 (ref 60–?)
GLUCOSE BLD-MCNC: 139 MG/DL (ref 70–99)
GLUCOSE BLD-MCNC: 140 MG/DL (ref 70–99)
GLUCOSE BLD-MCNC: 186 MG/DL (ref 70–99)
GLUCOSE BLD-MCNC: 187 MG/DL (ref 70–99)
GLUCOSE BLD-MCNC: 210 MG/DL (ref 70–99)
GLUCOSE BLD-MCNC: 247 MG/DL (ref 70–99)
HCT VFR BLD AUTO: 38.8 %
HGB BLD-MCNC: 13.6 G/DL
IMM GRANULOCYTES # BLD AUTO: 0.12 X10(3) UL (ref 0–1)
IMM GRANULOCYTES NFR BLD: 1.3 %
INR BLD: 1.06 (ref 0.85–1.16)
LYMPHOCYTES # BLD AUTO: 0.98 X10(3) UL (ref 1–4)
LYMPHOCYTES NFR BLD AUTO: 10.6 %
MCH RBC QN AUTO: 34 PG (ref 26–34)
MCHC RBC AUTO-ENTMCNC: 35.1 G/DL (ref 31–37)
MCV RBC AUTO: 97 FL
MONOCYTES # BLD AUTO: 0.43 X10(3) UL (ref 0.1–1)
MONOCYTES NFR BLD AUTO: 4.7 %
NEUTROPHILS # BLD AUTO: 7.69 X10 (3) UL (ref 1.5–7.7)
NEUTROPHILS # BLD AUTO: 7.69 X10(3) UL (ref 1.5–7.7)
NEUTROPHILS NFR BLD AUTO: 83.3 %
OSMOLALITY SERPL CALC.SUM OF ELEC: 297 MOSM/KG (ref 275–295)
PLATELET # BLD AUTO: 109 10(3)UL (ref 150–450)
POTASSIUM SERPL-SCNC: 4.7 MMOL/L (ref 3.5–5.1)
PROTHROMBIN TIME: 13.8 SECONDS (ref 11.6–14.8)
RBC # BLD AUTO: 4 X10(6)UL
RH BLOOD TYPE: NEGATIVE
SODIUM SERPL-SCNC: 139 MMOL/L (ref 136–145)
WBC # BLD AUTO: 9.2 X10(3) UL (ref 4–11)

## 2022-08-07 PROCEDURE — 87070 CULTURE OTHR SPECIMN AEROBIC: CPT | Performed by: PODIATRIST

## 2022-08-07 PROCEDURE — 87176 TISSUE HOMOGENIZATION CULTR: CPT | Performed by: PODIATRIST

## 2022-08-07 PROCEDURE — 87077 CULTURE AEROBIC IDENTIFY: CPT | Performed by: PODIATRIST

## 2022-08-07 PROCEDURE — 85025 COMPLETE CBC W/AUTO DIFF WBC: CPT | Performed by: INTERNAL MEDICINE

## 2022-08-07 PROCEDURE — 87205 SMEAR GRAM STAIN: CPT | Performed by: PODIATRIST

## 2022-08-07 PROCEDURE — 86901 BLOOD TYPING SEROLOGIC RH(D): CPT | Performed by: INTERNAL MEDICINE

## 2022-08-07 PROCEDURE — 86140 C-REACTIVE PROTEIN: CPT | Performed by: INTERNAL MEDICINE

## 2022-08-07 PROCEDURE — 76000 FLUOROSCOPY <1 HR PHYS/QHP: CPT | Performed by: PODIATRIST

## 2022-08-07 PROCEDURE — 82962 GLUCOSE BLOOD TEST: CPT

## 2022-08-07 PROCEDURE — 86850 RBC ANTIBODY SCREEN: CPT | Performed by: INTERNAL MEDICINE

## 2022-08-07 PROCEDURE — 0Y6M0ZD DETACHMENT AT RIGHT FOOT, PARTIAL 4TH RAY, OPEN APPROACH: ICD-10-PCS | Performed by: PODIATRIST

## 2022-08-07 PROCEDURE — 86900 BLOOD TYPING SEROLOGIC ABO: CPT | Performed by: INTERNAL MEDICINE

## 2022-08-07 PROCEDURE — 85730 THROMBOPLASTIN TIME PARTIAL: CPT | Performed by: INTERNAL MEDICINE

## 2022-08-07 PROCEDURE — 85610 PROTHROMBIN TIME: CPT | Performed by: INTERNAL MEDICINE

## 2022-08-07 PROCEDURE — 87075 CULTR BACTERIA EXCEPT BLOOD: CPT | Performed by: PODIATRIST

## 2022-08-07 PROCEDURE — 88304 TISSUE EXAM BY PATHOLOGIST: CPT | Performed by: PODIATRIST

## 2022-08-07 PROCEDURE — 80048 BASIC METABOLIC PNL TOTAL CA: CPT | Performed by: INTERNAL MEDICINE

## 2022-08-07 PROCEDURE — 88311 DECALCIFY TISSUE: CPT | Performed by: PODIATRIST

## 2022-08-07 RX ORDER — ONDANSETRON 2 MG/ML
INJECTION INTRAMUSCULAR; INTRAVENOUS
Status: COMPLETED
Start: 2022-08-07 | End: 2022-08-07

## 2022-08-07 RX ORDER — SODIUM CHLORIDE, SODIUM LACTATE, POTASSIUM CHLORIDE, CALCIUM CHLORIDE 600; 310; 30; 20 MG/100ML; MG/100ML; MG/100ML; MG/100ML
INJECTION, SOLUTION INTRAVENOUS CONTINUOUS PRN
Status: DISCONTINUED | OUTPATIENT
Start: 2022-08-07 | End: 2022-08-07 | Stop reason: SURG

## 2022-08-07 RX ORDER — NALOXONE HYDROCHLORIDE 0.4 MG/ML
80 INJECTION, SOLUTION INTRAMUSCULAR; INTRAVENOUS; SUBCUTANEOUS AS NEEDED
Status: DISCONTINUED | OUTPATIENT
Start: 2022-08-07 | End: 2022-08-07 | Stop reason: HOSPADM

## 2022-08-07 RX ORDER — DEXTROSE MONOHYDRATE 25 G/50ML
50 INJECTION, SOLUTION INTRAVENOUS
Status: DISCONTINUED | OUTPATIENT
Start: 2022-08-07 | End: 2022-08-07 | Stop reason: HOSPADM

## 2022-08-07 RX ORDER — ACETAMINOPHEN 500 MG
1000 TABLET ORAL ONCE AS NEEDED
Status: DISCONTINUED | OUTPATIENT
Start: 2022-08-07 | End: 2022-08-07 | Stop reason: HOSPADM

## 2022-08-07 RX ORDER — TRAMADOL HYDROCHLORIDE 50 MG/1
50 TABLET ORAL EVERY 6 HOURS PRN
Status: DISCONTINUED | OUTPATIENT
Start: 2022-08-07 | End: 2022-08-08

## 2022-08-07 RX ORDER — DOXYCYCLINE HYCLATE 100 MG/1
100 CAPSULE ORAL 2 TIMES DAILY
Qty: 80 CAPSULE | Refills: 0 | Status: SHIPPED | OUTPATIENT
Start: 2022-08-07 | End: 2022-08-09

## 2022-08-07 RX ORDER — ONDANSETRON 2 MG/ML
4 INJECTION INTRAMUSCULAR; INTRAVENOUS EVERY 6 HOURS PRN
Status: DISCONTINUED | OUTPATIENT
Start: 2022-08-07 | End: 2022-08-07 | Stop reason: HOSPADM

## 2022-08-07 RX ORDER — METOCLOPRAMIDE HYDROCHLORIDE 5 MG/ML
10 INJECTION INTRAMUSCULAR; INTRAVENOUS EVERY 8 HOURS PRN
Status: DISCONTINUED | OUTPATIENT
Start: 2022-08-07 | End: 2022-08-07 | Stop reason: HOSPADM

## 2022-08-07 RX ORDER — NICOTINE POLACRILEX 4 MG
30 LOZENGE BUCCAL
Status: DISCONTINUED | OUTPATIENT
Start: 2022-08-07 | End: 2022-08-07 | Stop reason: HOSPADM

## 2022-08-07 RX ORDER — BUPIVACAINE HYDROCHLORIDE 5 MG/ML
INJECTION, SOLUTION EPIDURAL; INTRACAUDAL AS NEEDED
Status: DISCONTINUED | OUTPATIENT
Start: 2022-08-07 | End: 2022-08-07 | Stop reason: HOSPADM

## 2022-08-07 RX ORDER — MELATONIN
3 NIGHTLY PRN
Status: DISCONTINUED | OUTPATIENT
Start: 2022-08-07 | End: 2022-08-10

## 2022-08-07 RX ORDER — HYDROMORPHONE HYDROCHLORIDE 1 MG/ML
0.4 INJECTION, SOLUTION INTRAMUSCULAR; INTRAVENOUS; SUBCUTANEOUS EVERY 5 MIN PRN
Status: DISCONTINUED | OUTPATIENT
Start: 2022-08-07 | End: 2022-08-07 | Stop reason: HOSPADM

## 2022-08-07 RX ORDER — ONDANSETRON 2 MG/ML
INJECTION INTRAMUSCULAR; INTRAVENOUS AS NEEDED
Status: DISCONTINUED | OUTPATIENT
Start: 2022-08-07 | End: 2022-08-07 | Stop reason: SURG

## 2022-08-07 RX ORDER — CEFAZOLIN SODIUM 1 G/3ML
INJECTION, POWDER, FOR SOLUTION INTRAMUSCULAR; INTRAVENOUS AS NEEDED
Status: DISCONTINUED | OUTPATIENT
Start: 2022-08-07 | End: 2022-08-07 | Stop reason: SURG

## 2022-08-07 RX ORDER — ENOXAPARIN SODIUM 100 MG/ML
40 INJECTION SUBCUTANEOUS DAILY
Status: DISCONTINUED | OUTPATIENT
Start: 2022-08-07 | End: 2022-08-10

## 2022-08-07 RX ORDER — EPHEDRINE SULFATE 50 MG/ML
INJECTION INTRAVENOUS AS NEEDED
Status: DISCONTINUED | OUTPATIENT
Start: 2022-08-07 | End: 2022-08-07 | Stop reason: SURG

## 2022-08-07 RX ORDER — LABETALOL HYDROCHLORIDE 5 MG/ML
INJECTION, SOLUTION INTRAVENOUS AS NEEDED
Status: DISCONTINUED | OUTPATIENT
Start: 2022-08-07 | End: 2022-08-07 | Stop reason: SURG

## 2022-08-07 RX ORDER — SODIUM CHLORIDE, SODIUM LACTATE, POTASSIUM CHLORIDE, CALCIUM CHLORIDE 600; 310; 30; 20 MG/100ML; MG/100ML; MG/100ML; MG/100ML
INJECTION, SOLUTION INTRAVENOUS CONTINUOUS
Status: DISCONTINUED | OUTPATIENT
Start: 2022-08-07 | End: 2022-08-07 | Stop reason: HOSPADM

## 2022-08-07 RX ORDER — HYDROCODONE BITARTRATE AND ACETAMINOPHEN 5; 325 MG/1; MG/1
2 TABLET ORAL ONCE AS NEEDED
Status: DISCONTINUED | OUTPATIENT
Start: 2022-08-07 | End: 2022-08-07 | Stop reason: HOSPADM

## 2022-08-07 RX ORDER — HYDROMORPHONE HYDROCHLORIDE 1 MG/ML
0.2 INJECTION, SOLUTION INTRAMUSCULAR; INTRAVENOUS; SUBCUTANEOUS EVERY 5 MIN PRN
Status: DISCONTINUED | OUTPATIENT
Start: 2022-08-07 | End: 2022-08-07 | Stop reason: HOSPADM

## 2022-08-07 RX ORDER — METOCLOPRAMIDE HYDROCHLORIDE 5 MG/ML
INJECTION INTRAMUSCULAR; INTRAVENOUS
Status: DISCONTINUED
Start: 2022-08-07 | End: 2022-08-07 | Stop reason: WASHOUT

## 2022-08-07 RX ORDER — ROCURONIUM BROMIDE 10 MG/ML
INJECTION, SOLUTION INTRAVENOUS AS NEEDED
Status: DISCONTINUED | OUTPATIENT
Start: 2022-08-07 | End: 2022-08-07 | Stop reason: SURG

## 2022-08-07 RX ORDER — HYDROCODONE BITARTRATE AND ACETAMINOPHEN 5; 325 MG/1; MG/1
1 TABLET ORAL ONCE AS NEEDED
Status: DISCONTINUED | OUTPATIENT
Start: 2022-08-07 | End: 2022-08-07 | Stop reason: HOSPADM

## 2022-08-07 RX ORDER — LIDOCAINE HYDROCHLORIDE 10 MG/ML
INJECTION, SOLUTION EPIDURAL; INFILTRATION; INTRACAUDAL; PERINEURAL AS NEEDED
Status: DISCONTINUED | OUTPATIENT
Start: 2022-08-07 | End: 2022-08-07 | Stop reason: SURG

## 2022-08-07 RX ORDER — HYDROMORPHONE HYDROCHLORIDE 1 MG/ML
0.6 INJECTION, SOLUTION INTRAMUSCULAR; INTRAVENOUS; SUBCUTANEOUS EVERY 5 MIN PRN
Status: DISCONTINUED | OUTPATIENT
Start: 2022-08-07 | End: 2022-08-07 | Stop reason: HOSPADM

## 2022-08-07 RX ORDER — NICOTINE POLACRILEX 4 MG
15 LOZENGE BUCCAL
Status: DISCONTINUED | OUTPATIENT
Start: 2022-08-07 | End: 2022-08-07 | Stop reason: HOSPADM

## 2022-08-07 RX ADMIN — ONDANSETRON 4 MG: 2 INJECTION INTRAMUSCULAR; INTRAVENOUS at 11:03:00

## 2022-08-07 RX ADMIN — SODIUM CHLORIDE, SODIUM LACTATE, POTASSIUM CHLORIDE, CALCIUM CHLORIDE: 600; 310; 30; 20 INJECTION, SOLUTION INTRAVENOUS at 10:34:00

## 2022-08-07 RX ADMIN — EPHEDRINE SULFATE 10 MG: 50 INJECTION INTRAVENOUS at 10:58:00

## 2022-08-07 RX ADMIN — ROCURONIUM BROMIDE 50 MG: 10 INJECTION, SOLUTION INTRAVENOUS at 10:34:00

## 2022-08-07 RX ADMIN — LIDOCAINE HYDROCHLORIDE 50 MG: 10 INJECTION, SOLUTION EPIDURAL; INFILTRATION; INTRACAUDAL; PERINEURAL at 10:34:00

## 2022-08-07 RX ADMIN — CEFAZOLIN SODIUM 2 G: 1 INJECTION, POWDER, FOR SOLUTION INTRAMUSCULAR; INTRAVENOUS at 10:39:00

## 2022-08-07 RX ADMIN — LABETALOL HYDROCHLORIDE 5 MG: 5 INJECTION, SOLUTION INTRAVENOUS at 11:17:00

## 2022-08-07 NOTE — PLAN OF CARE
Notified hospitalist that anticoags were still on hold. He requested DPM be contacted. Per DPM hold Pt's home anticoags and start Lovenox.

## 2022-08-07 NOTE — OPERATIVE REPORT
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 Using 10 cc of 0.5% Marcaine plain via fourth ray block right foot     HEMOSTASIS:   Pneumatic ankle tourniquet inflated to 250 mmHg following exsanguination Lance's bandage right lower extremity left down prior to the end of the case because of slight venous tourniquet     ESTIMATED BLOOD LOSS:   15 cc     INDICATIONS:  This 69year old male presented with this gentleman had an ulceration on the plantar aspect of his fourth metatarsal which eventually went an MRI was done showed osteomyelitis of the fourth metatarsal.  This has been nonhealing for a long time was recently seen by infectious disease who did not notice an open ulceration but there is still a dried hemorrhagic scab in the center of the callus underneath the fourth metatarsal head and this was his usual presentation and after debridement usually revealed an ulceration. FINDINGS:   Erosive changes noted to the head of the fourth metatarsal.  The fourth toe was in a dislocated position that has migrated at least a centimeter and a half proximal to the joint and was impart a causative factor in the severe plantarflexion of the bone itself which led to the development of hyperkeratosis and eventually ulceration, right foot. SPECIMENS:   2 specimens were sent as follows from the right foot:  1.   Fourth toe to pathology for osteomyelitis  2. Partial fourth metatarsal first to microbiology for aerobic and anaerobic culture and sensitivity and the pathology for osteomyelitis checking the proximal margin for clearance. COMPLICATIONS:  None. DRAINS:   1/4 inch iodoform gauze packing    OPERATIVE TECHNIQUE:      The patient was brought into the operating with vital signs stable and placed in supine position on the operating table. With all personnel present all equipment was checked and functional timeout was taken and there were no additional significant concerns reported. Sedation was administered the right foot was anesthetized and then prepped and draped using usual aseptic technique. Hemostasis was achieved as above. A 5 cm linear incision in a tennis racquet pattern was done encompassing the base of the fourth toe. The incision was deepened using both sharp and blunt technique superficial veins were ligated cauterized or retracted as needed. The fourth toe was identified at the base dissected free and was sent to pathology. Dissection was carried out deeply on adjacent sides of the fourth metatarsal releasing all intermetatarsal ligaments and tendon structures. A sagittal saw used to cut the metatarsal from dorsal to plantar about one half of the way back perpendicular to the long axis in both the sagittal and transverse planes. Metatarsal fragment was then dissected free and sent first to micro and pathology as documented above. Fluoroscopy was used to visualize resection was found to be good and adequate the adjacent metatarsals were not cut. Once the area was examined for any remaining necrotic tissue which was excisionally debrided utilizing a forceps and scissors the area was flushed with Irrisept and hemostasis was further achieved with electrocautery.   The area was packed with 1/4 inch iodoform gauze the wound edges were reapproximated and maintained using 3-0 Prolene in simple interrupted fashion. The area was dressed with Xeroform sterile gauze 4 inch Kerlix roll followed by an Ace wrap for compression.   Patient tolerated the above anesthesia procedure well left the operating with vital signs stable and vascular status of his right foot intact to recovery room via cart    Eddie Shore DPM

## 2022-08-07 NOTE — PLAN OF CARE
POD 0 partial resection of 4th metatarsal of Rt foot, Pt is AAOX4, VSS, room air, glucose monitored and treated per orders, WB only for trips to restroom w/ post-op shoe in place, PT / OT eval pending, minimal pain reported, IV SL, Kerlix and ACE wrap dressing in place remain CDI, Pt doing well, all needs met, all safety measures in place, call light within reach, will CTM. Wet to dry dressing done w/ Dakins solution, wound appears clean, vital, and healthy at this time.

## 2022-08-07 NOTE — ANESTHESIA PROCEDURE NOTES
Airway  Date/Time: 8/7/2022 10:36 AM  Urgency: elective      General Information and Staff    Patient location during procedure: OR  Anesthesiologist: Ariella Gonzales MD  Performed: anesthesiologist     Indications and Patient Condition  Indications for airway management: anesthesia  Sedation level: deep  Preoxygenated: yes  Patient position: sniffing  Mask difficulty assessment: 1 - vent by mask    Final Airway Details  Final airway type: endotracheal airway      Successful airway: ETT  Cuffed: yes   Successful intubation technique: direct laryngoscopy  Endotracheal tube insertion site: oral  Blade size: #3  ETT size (mm): 7.5    Placement verified by: chest auscultation and capnometry   Cuff volume (mL): 10  Measured from: lips  Number of attempts at approach: 1  Number of other approaches attempted: 0    Additional Comments  Dentition unchanged from pre op

## 2022-08-07 NOTE — PLAN OF CARE
Pt denies pain or discomfort to rt foot. Ambulating in room and hallway independently. Rt foot no redness, slight swelling, callous to plantar surface intact. Encouraged to elevate leg when in bed. VSS, afebrile. Blood sugar tonight was 268, Novolog SS coverage is only with meals. Dr Marella Councilman made aware of pt's blood sugar, order received.

## 2022-08-08 LAB
ANION GAP SERPL CALC-SCNC: 5 MMOL/L (ref 0–18)
BUN BLD-MCNC: 22 MG/DL (ref 7–18)
CALCIUM BLD-MCNC: 9.2 MG/DL (ref 8.5–10.1)
CHLORIDE SERPL-SCNC: 108 MMOL/L (ref 98–112)
CO2 SERPL-SCNC: 25 MMOL/L (ref 21–32)
CREAT BLD-MCNC: 1.27 MG/DL
GFR SERPLBLD BASED ON 1.73 SQ M-ARVRAT: 59 ML/MIN/1.73M2 (ref 60–?)
GLUCOSE BLD-MCNC: 140 MG/DL (ref 70–99)
GLUCOSE BLD-MCNC: 152 MG/DL (ref 70–99)
GLUCOSE BLD-MCNC: 164 MG/DL (ref 70–99)
GLUCOSE BLD-MCNC: 177 MG/DL (ref 70–99)
GLUCOSE BLD-MCNC: 205 MG/DL (ref 70–99)
GLUCOSE BLD-MCNC: 219 MG/DL (ref 70–99)
OSMOLALITY SERPL CALC.SUM OF ELEC: 292 MOSM/KG (ref 275–295)
POTASSIUM SERPL-SCNC: 4.6 MMOL/L (ref 3.5–5.1)
SODIUM SERPL-SCNC: 138 MMOL/L (ref 136–145)

## 2022-08-08 PROCEDURE — 80048 BASIC METABOLIC PNL TOTAL CA: CPT | Performed by: PODIATRIST

## 2022-08-08 PROCEDURE — 97161 PT EVAL LOW COMPLEX 20 MIN: CPT

## 2022-08-08 PROCEDURE — 97530 THERAPEUTIC ACTIVITIES: CPT

## 2022-08-08 PROCEDURE — 82962 GLUCOSE BLOOD TEST: CPT

## 2022-08-08 RX ORDER — POLYETHYLENE GLYCOL 3350 17 G/17G
17 POWDER, FOR SOLUTION ORAL DAILY PRN
Status: DISCONTINUED | OUTPATIENT
Start: 2022-08-08 | End: 2022-08-10

## 2022-08-08 RX ORDER — HYDROCODONE BITARTRATE AND ACETAMINOPHEN 5; 325 MG/1; MG/1
1 TABLET ORAL EVERY 6 HOURS PRN
Status: DISCONTINUED | OUTPATIENT
Start: 2022-08-08 | End: 2022-08-08

## 2022-08-08 RX ORDER — SENNA AND DOCUSATE SODIUM 50; 8.6 MG/1; MG/1
1 TABLET, FILM COATED ORAL 2 TIMES DAILY
Status: DISCONTINUED | OUTPATIENT
Start: 2022-08-08 | End: 2022-08-10

## 2022-08-08 RX ORDER — VANCOMYCIN HYDROCHLORIDE
15 EVERY 24 HOURS
Status: DISCONTINUED | OUTPATIENT
Start: 2022-08-09 | End: 2022-08-10

## 2022-08-08 RX ORDER — BISACODYL 10 MG
10 SUPPOSITORY, RECTAL RECTAL
Status: DISCONTINUED | OUTPATIENT
Start: 2022-08-08 | End: 2022-08-10

## 2022-08-08 RX ORDER — HYDROCODONE BITARTRATE AND ACETAMINOPHEN 5; 325 MG/1; MG/1
1-2 TABLET ORAL EVERY 4 HOURS PRN
Status: DISCONTINUED | OUTPATIENT
Start: 2022-08-08 | End: 2022-08-10

## 2022-08-08 RX ORDER — VANCOMYCIN 2 GRAM/500 ML IN 0.9 % SODIUM CHLORIDE INTRAVENOUS
25 ONCE
Status: COMPLETED | OUTPATIENT
Start: 2022-08-08 | End: 2022-08-08

## 2022-08-08 NOTE — PLAN OF CARE
Pt states Tylenol that was given 4 hrs ago didn't help much with his foot pain. Also requesting for a sleeping pill. VSS, afebrile, ambulating with SBA using walker and wearing post-op shoe. AW dsg cdi, pt able to move foot without difficulty. Blood sugar tonight 247, on Novolog SS but coverage is with meals only. Dr Monty Devlin informed of above, new orders received.

## 2022-08-08 NOTE — PLAN OF CARE
POD 1 partial resection of 4th metatarsal of Rt foot, Pt is AAOX4, VSS, room air, IV SL, WB only to restroom w/ post-op shoe in place, heel bearing only, dressing remains CDI, scant spot of drainage, on Lovenox, PO meds for pain, waiting on cultures, ID MD, and PT / OT eval. Pt doing well, all needs met, all safety measures in place, call light within reach, will CTM.

## 2022-08-08 NOTE — TELEPHONE ENCOUNTER
Message taken on 8/5. Pt currently admitted in the hospital, had partial 4th metatarsal resection right foot on 8/7.  [facility]

## 2022-08-09 LAB
ANION GAP SERPL CALC-SCNC: 5 MMOL/L (ref 0–18)
BASOPHILS # BLD AUTO: 0.01 X10(3) UL (ref 0–0.2)
BASOPHILS NFR BLD AUTO: 0.1 %
BUN BLD-MCNC: 22 MG/DL (ref 7–18)
CALCIUM BLD-MCNC: 9.2 MG/DL (ref 8.5–10.1)
CHLORIDE SERPL-SCNC: 105 MMOL/L (ref 98–112)
CO2 SERPL-SCNC: 28 MMOL/L (ref 21–32)
CREAT BLD-MCNC: 1.25 MG/DL
EOSINOPHIL # BLD AUTO: 0.02 X10(3) UL (ref 0–0.7)
EOSINOPHIL NFR BLD AUTO: 0.3 %
ERYTHROCYTE [DISTWIDTH] IN BLOOD BY AUTOMATED COUNT: 13.8 %
GFR SERPLBLD BASED ON 1.73 SQ M-ARVRAT: 60 ML/MIN/1.73M2 (ref 60–?)
GLUCOSE BLD-MCNC: 174 MG/DL (ref 70–99)
GLUCOSE BLD-MCNC: 186 MG/DL (ref 70–99)
GLUCOSE BLD-MCNC: 199 MG/DL (ref 70–99)
GLUCOSE BLD-MCNC: 237 MG/DL (ref 70–99)
GLUCOSE BLD-MCNC: 273 MG/DL (ref 70–99)
HCT VFR BLD AUTO: 34.9 %
HGB BLD-MCNC: 12.2 G/DL
IMM GRANULOCYTES # BLD AUTO: 0.06 X10(3) UL (ref 0–1)
IMM GRANULOCYTES NFR BLD: 0.8 %
LYMPHOCYTES # BLD AUTO: 1.06 X10(3) UL (ref 1–4)
LYMPHOCYTES NFR BLD AUTO: 14.6 %
MCH RBC QN AUTO: 34 PG (ref 26–34)
MCHC RBC AUTO-ENTMCNC: 35 G/DL (ref 31–37)
MCV RBC AUTO: 97.2 FL
MONOCYTES # BLD AUTO: 0.86 X10(3) UL (ref 0.1–1)
MONOCYTES NFR BLD AUTO: 11.9 %
NEUTROPHILS # BLD AUTO: 5.24 X10 (3) UL (ref 1.5–7.7)
NEUTROPHILS # BLD AUTO: 5.24 X10(3) UL (ref 1.5–7.7)
NEUTROPHILS NFR BLD AUTO: 72.3 %
OSMOLALITY SERPL CALC.SUM OF ELEC: 294 MOSM/KG (ref 275–295)
PLATELET # BLD AUTO: 75 10(3)UL (ref 150–450)
POTASSIUM SERPL-SCNC: 4.5 MMOL/L (ref 3.5–5.1)
RBC # BLD AUTO: 3.59 X10(6)UL
SODIUM SERPL-SCNC: 138 MMOL/L (ref 136–145)
WBC # BLD AUTO: 7.3 X10(3) UL (ref 4–11)

## 2022-08-09 PROCEDURE — 82962 GLUCOSE BLOOD TEST: CPT

## 2022-08-09 PROCEDURE — 85025 COMPLETE CBC W/AUTO DIFF WBC: CPT | Performed by: INTERNAL MEDICINE

## 2022-08-09 PROCEDURE — 97530 THERAPEUTIC ACTIVITIES: CPT

## 2022-08-09 PROCEDURE — 97165 OT EVAL LOW COMPLEX 30 MIN: CPT

## 2022-08-09 PROCEDURE — 97535 SELF CARE MNGMENT TRAINING: CPT

## 2022-08-09 PROCEDURE — L2999 LOWER EXTREMITY ORTHOSIS NOS: HCPCS

## 2022-08-09 PROCEDURE — 80048 BASIC METABOLIC PNL TOTAL CA: CPT | Performed by: INTERNAL MEDICINE

## 2022-08-09 RX ORDER — DOXYCYCLINE HYCLATE 100 MG/1
100 CAPSULE ORAL 2 TIMES DAILY
Qty: 28 CAPSULE | Refills: 0 | Status: SHIPPED | OUTPATIENT
Start: 2022-08-09 | End: 2022-08-23

## 2022-08-09 NOTE — PLAN OF CARE
Pt a/ox4. Pain controlled with Po norco. Still reports a significant increase in pain when foot dependent. Globoped shoe delivered this evening, pt reports with shoe he is better tolerating sitting up on edge of bed. PT/OT to see again tomorrow with shoe. Voiding to urinal.   Vancomycin per ID, new IV started this afternoon. Plan to home when ready. Patient agreeable to home health PT and RN if needed.

## 2022-08-09 NOTE — PLAN OF CARE
A&O x 4. VSS. On RA. Mod-severe pain to right foot surgical sight, Norco PRN. Denies N/T. Dressing to right foot C/D/I. Surgical shoe in place. Up with partial 50% WB to bathroom only. Elevate foot while in bed or chair. Reviewed POC, pain management, and fall precautions with pt. Plan home when ready. Will continue to monitor.

## 2022-08-10 ENCOUNTER — TELEPHONE (OUTPATIENT)
Dept: PODIATRY CLINIC | Facility: CLINIC | Age: 77
End: 2022-08-10

## 2022-08-10 VITALS
TEMPERATURE: 98 F | DIASTOLIC BLOOD PRESSURE: 69 MMHG | HEART RATE: 68 BPM | SYSTOLIC BLOOD PRESSURE: 154 MMHG | OXYGEN SATURATION: 97 % | RESPIRATION RATE: 17 BRPM | BODY MASS INDEX: 29 KG/M2 | WEIGHT: 208 LBS

## 2022-08-10 DIAGNOSIS — Z98.890 STATUS POST FOOT SURGERY: Primary | ICD-10-CM

## 2022-08-10 LAB
ANION GAP SERPL CALC-SCNC: 3 MMOL/L (ref 0–18)
BUN BLD-MCNC: 21 MG/DL (ref 7–18)
CALCIUM BLD-MCNC: 9.3 MG/DL (ref 8.5–10.1)
CHLORIDE SERPL-SCNC: 105 MMOL/L (ref 98–112)
CO2 SERPL-SCNC: 28 MMOL/L (ref 21–32)
CREAT BLD-MCNC: 1.18 MG/DL
GFR SERPLBLD BASED ON 1.73 SQ M-ARVRAT: 64 ML/MIN/1.73M2 (ref 60–?)
GLUCOSE BLD-MCNC: 219 MG/DL (ref 70–99)
GLUCOSE BLD-MCNC: 223 MG/DL (ref 70–99)
OSMOLALITY SERPL CALC.SUM OF ELEC: 292 MOSM/KG (ref 275–295)
POTASSIUM SERPL-SCNC: 4.2 MMOL/L (ref 3.5–5.1)
SODIUM SERPL-SCNC: 136 MMOL/L (ref 136–145)

## 2022-08-10 PROCEDURE — 80048 BASIC METABOLIC PNL TOTAL CA: CPT | Performed by: INTERNAL MEDICINE

## 2022-08-10 PROCEDURE — 97530 THERAPEUTIC ACTIVITIES: CPT

## 2022-08-10 PROCEDURE — 82962 GLUCOSE BLOOD TEST: CPT

## 2022-08-10 RX ORDER — HYDROCODONE BITARTRATE AND ACETAMINOPHEN 5; 325 MG/1; MG/1
1 TABLET ORAL EVERY 6 HOURS PRN
Qty: 30 TABLET | Refills: 0 | Status: SHIPPED | OUTPATIENT
Start: 2022-08-10

## 2022-08-10 NOTE — PHYSICAL THERAPY NOTE
Attempted to see Pt this AM - RN Newton Mosqueda aware of attempt. Per PT Eval - Pt was cleared for short distance ambulation, and stair training. Pt reporting that MD told him not get out of bed. Pt does have podiatry shoe at this time.

## 2022-08-10 NOTE — PLAN OF CARE
Written and verbal dc instructions given to pt and son. Verbalized understanding. Left hospital at [phone] as per order.

## 2022-08-10 NOTE — PHYSICAL THERAPY NOTE
IP PT attempted, per epic message c Dr. Rubén Chapman, pt may attempt forefoot off loading shoe for t/f and ambulation. D/w RN who will order shoe. Will f/u as appropriate.

## 2022-08-10 NOTE — PLAN OF CARE
Pt ambulates with SBA in room with post op shoe, otherwise bedrest with RLE elevated on pillows. Dressing dry and intact to right foot. Pain controlled with Norco prn. Seen by Dr. Dilip Rocha and Dr. Wesley Silva. OK for dc to home today and follow up with Dr. Dilip Rocha on Tuesday for dressing change.

## 2022-08-10 NOTE — TELEPHONE ENCOUNTER
Please make sure the patient gets in ICU this coming Monday or Tuesday double book if needed.   He is a postoperative patient

## 2022-08-10 NOTE — CM/SW NOTE
Spoke with pt's RN regarding DC planning. Plan for discharge on oral antibiotics. Per podiatry, patient to follow up in office for wound care. No needs for HH at this time. No other DC needs identified. / to remain available for support and/or discharge planning.      Zeenat Degroot [facility]  Discharge Planner  [phone]

## 2022-08-10 NOTE — PLAN OF CARE
A&O x 4. VSS. On RA. Mild-mod pain to right foot, Norco PRN. Denies N/T. Dressing to right foot C/D/I, changed this afternoon by Podiatry MD. Surgical shoe in place. Up with partial 50% WB to bathroom only. Elevate foot while in bed or chair. Reviewed POC, pain management, and fall precautions with pt. Plan home when ready. Will continue to monitor.

## 2022-08-11 NOTE — PAYOR COMM NOTE
--------------  DISCHARGE REVIEW    Payor: [facility] [place] #:  [id number]  Authorization Number: [id number]    Admit date: 8/6/22  Admit time:  11:57 AM  Discharge Date: 8/10/2022 12:26 PM     Admitting Physician: Ellen Rizo DO  Attending Physician:  No att. providers found  Primary Care Physician: Lynne Denise MD      REVIEWER COMMENTS    FOR FINAL REVIEW/APPROVAL OF ALL INPT DAYS

## 2022-08-12 ENCOUNTER — TELEPHONE (OUTPATIENT)
Dept: PODIATRY CLINIC | Facility: CLINIC | Age: 77
End: 2022-08-12

## 2022-08-12 RX ORDER — TRAMADOL HYDROCHLORIDE 50 MG/1
50 TABLET ORAL EVERY 12 HOURS PRN
Qty: 30 TABLET | Refills: 0 | Status: SHIPPED | OUTPATIENT
Start: 2022-08-12

## 2022-08-12 NOTE — TELEPHONE ENCOUNTER
Please call the patient and let her know that I did prescribe tramadol to his pharmacy and remind him he cannot take tramadol and Norco at the same time thank

## 2022-08-12 NOTE — TELEPHONE ENCOUNTER
Pt states the following medication is not working for him needs something stronger states tramadol works better please advise       HYDROcodone-acetaminophen 5-325 MG

## 2022-08-12 NOTE — TELEPHONE ENCOUNTER
Patient called and states  the Tramadol works well for his pain. THe Norco made him nauseated. Instructed that he should not take both drugs at the same time. He will be her for his next appt.

## 2022-08-16 ENCOUNTER — OFFICE VISIT (OUTPATIENT)
Dept: PODIATRY CLINIC | Facility: CLINIC | Age: 77
End: 2022-08-16
Payer: COMMERCIAL

## 2022-08-16 DIAGNOSIS — Z98.890 STATUS POST FOOT SURGERY: Primary | ICD-10-CM

## 2022-08-16 PROCEDURE — 1125F AMNT PAIN NOTED PAIN PRSNT: CPT | Performed by: PODIATRIST

## 2022-08-16 PROCEDURE — 99024 POSTOP FOLLOW-UP VISIT: CPT | Performed by: PODIATRIST

## 2022-08-16 PROCEDURE — L3260 AMBULATORY SURGICAL BOOT EAC: HCPCS | Performed by: PODIATRIST

## 2022-08-23 ENCOUNTER — OFFICE VISIT (OUTPATIENT)
Dept: PODIATRY CLINIC | Facility: CLINIC | Age: 77
End: 2022-08-23
Payer: COMMERCIAL

## 2022-08-23 DIAGNOSIS — Z98.890 STATUS POST FOOT SURGERY: Primary | ICD-10-CM

## 2022-08-23 PROCEDURE — 1126F AMNT PAIN NOTED NONE PRSNT: CPT | Performed by: PODIATRIST

## 2022-08-23 PROCEDURE — 99024 POSTOP FOLLOW-UP VISIT: CPT | Performed by: PODIATRIST

## 2022-08-23 RX ORDER — CEFADROXIL 500 MG/1
500 CAPSULE ORAL 2 TIMES DAILY
COMMUNITY
Start: 2022-08-17 | End: 2022-09-07

## 2022-08-29 ENCOUNTER — OFFICE VISIT (OUTPATIENT)
Dept: PODIATRY CLINIC | Facility: CLINIC | Age: 77
End: 2022-08-29
Payer: COMMERCIAL

## 2022-08-29 DIAGNOSIS — Z98.890 STATUS POST FOOT SURGERY: Primary | ICD-10-CM

## 2022-08-29 PROCEDURE — 99024 POSTOP FOLLOW-UP VISIT: CPT | Performed by: PODIATRIST

## 2022-08-29 PROCEDURE — 1111F DSCHRG MED/CURRENT MED MERGE: CPT | Performed by: PODIATRIST

## 2022-09-13 ENCOUNTER — OFFICE VISIT (OUTPATIENT)
Dept: PODIATRY CLINIC | Facility: CLINIC | Age: 77
End: 2022-09-13
Payer: COMMERCIAL

## 2022-09-13 DIAGNOSIS — Z98.890 STATUS POST FOOT SURGERY: Primary | ICD-10-CM

## 2022-09-13 PROCEDURE — 99024 POSTOP FOLLOW-UP VISIT: CPT | Performed by: PODIATRIST

## 2022-10-11 ENCOUNTER — OFFICE VISIT (OUTPATIENT)
Dept: PODIATRY CLINIC | Facility: CLINIC | Age: 77
End: 2022-10-11
Payer: COMMERCIAL

## 2022-10-11 DIAGNOSIS — Z98.890 STATUS POST FOOT SURGERY: Primary | ICD-10-CM

## 2022-10-11 DIAGNOSIS — T81.31XD POSTOPERATIVE WOUND DEHISCENCE, SUBSEQUENT ENCOUNTER: ICD-10-CM

## 2022-10-11 DIAGNOSIS — N18.30 STAGE 3 CHRONIC KIDNEY DISEASE, UNSPECIFIED WHETHER STAGE 3A OR 3B CKD (HCC): ICD-10-CM

## 2022-10-11 PROCEDURE — 99024 POSTOP FOLLOW-UP VISIT: CPT | Performed by: PODIATRIST

## 2022-11-14 ENCOUNTER — OFFICE VISIT (OUTPATIENT)
Dept: PODIATRY CLINIC | Facility: CLINIC | Age: 77
End: 2022-11-14
Payer: COMMERCIAL

## 2022-11-14 DIAGNOSIS — Z98.890 STATUS POST FOOT SURGERY: Primary | ICD-10-CM

## 2022-11-14 DIAGNOSIS — N18.30 STAGE 3 CHRONIC KIDNEY DISEASE, UNSPECIFIED WHETHER STAGE 3A OR 3B CKD (HCC): ICD-10-CM

## 2022-11-14 PROCEDURE — 99213 OFFICE O/P EST LOW 20 MIN: CPT | Performed by: PODIATRIST

## 2023-01-16 ENCOUNTER — OFFICE VISIT (OUTPATIENT)
Dept: PODIATRY CLINIC | Facility: CLINIC | Age: 78
End: 2023-01-16
Payer: MEDICARE

## 2023-01-16 DIAGNOSIS — N18.30 STAGE 3 CHRONIC KIDNEY DISEASE, UNSPECIFIED WHETHER STAGE 3A OR 3B CKD (HCC): Primary | ICD-10-CM

## 2023-01-16 DIAGNOSIS — B35.1 ONYCHOMYCOSIS: ICD-10-CM

## 2023-01-16 DIAGNOSIS — L60.0 ONYCHOCRYPTOSIS: ICD-10-CM

## 2023-01-16 DIAGNOSIS — I73.89 OTHER SPECIFIED PERIPHERAL VASCULAR DISEASES (HCC): ICD-10-CM

## 2023-01-16 PROCEDURE — 99213 OFFICE O/P EST LOW 20 MIN: CPT | Performed by: PODIATRIST

## 2023-03-20 ENCOUNTER — OFFICE VISIT (OUTPATIENT)
Dept: PODIATRY CLINIC | Facility: CLINIC | Age: 78
End: 2023-03-20

## 2023-03-20 DIAGNOSIS — L60.0 ONYCHOCRYPTOSIS: ICD-10-CM

## 2023-03-20 DIAGNOSIS — B35.1 ONYCHOMYCOSIS: ICD-10-CM

## 2023-03-20 DIAGNOSIS — I73.89 OTHER SPECIFIED PERIPHERAL VASCULAR DISEASES (HCC): ICD-10-CM

## 2023-03-20 DIAGNOSIS — N18.30 STAGE 3 CHRONIC KIDNEY DISEASE, UNSPECIFIED WHETHER STAGE 3A OR 3B CKD (HCC): Primary | ICD-10-CM

## 2023-03-20 PROCEDURE — 99213 OFFICE O/P EST LOW 20 MIN: CPT | Performed by: PODIATRIST

## 2023-03-20 PROCEDURE — 1126F AMNT PAIN NOTED NONE PRSNT: CPT | Performed by: PODIATRIST

## 2023-04-23 ENCOUNTER — APPOINTMENT (OUTPATIENT)
Dept: CT IMAGING | Age: 78
End: 2023-04-23
Attending: EMERGENCY MEDICINE

## 2023-04-23 ENCOUNTER — HOSPITAL ENCOUNTER (EMERGENCY)
Age: 78
Discharge: HOME | End: 2023-04-23
Attending: EMERGENCY MEDICINE

## 2023-04-23 VITALS
OXYGEN SATURATION: 97 % | HEIGHT: 70 IN | TEMPERATURE: 97.7 F | DIASTOLIC BLOOD PRESSURE: 64 MMHG | SYSTOLIC BLOOD PRESSURE: 130 MMHG | HEART RATE: 81 BPM | RESPIRATION RATE: 18 BRPM

## 2023-04-23 DIAGNOSIS — R93.5 ABNORMAL CT OF THE ABDOMEN: ICD-10-CM

## 2023-04-23 DIAGNOSIS — R16.1 SPLENOMEGALY: ICD-10-CM

## 2023-04-23 DIAGNOSIS — R10.9 FLANK PAIN: Primary | ICD-10-CM

## 2023-04-23 LAB
ALBUMIN SERPL-MCNC: 4 G/DL (ref 3.6–5.1)
ALP SERPL-CCNC: 111 UNITS/L (ref 45–117)
ALT SERPL-CCNC: 27 UNITS/L
ANION GAP SERPL CALC-SCNC: 4 MMOL/L (ref 7–19)
APPEARANCE UR: CLEAR
AST SERPL-CCNC: 16 UNITS/L
BASOPHILS # BLD: 0 K/MCL (ref 0–0.3)
BASOPHILS NFR BLD: 0 %
BILIRUB CONJ SERPL-MCNC: 0.3 MG/DL (ref 0–0.2)
BILIRUB SERPL-MCNC: 1.3 MG/DL (ref 0.2–1)
BILIRUB UR QL STRIP: NEGATIVE
BUN SERPL-MCNC: 20 MG/DL (ref 6–20)
BUN/CREAT SERPL: 17 (ref 7–25)
CALCIUM SERPL-MCNC: 9.4 MG/DL (ref 8.4–10.2)
CHLORIDE SERPL-SCNC: 109 MMOL/L (ref 97–110)
CO2 SERPL-SCNC: 29 MMOL/L (ref 21–32)
COLOR UR: NORMAL
CREAT SERPL-MCNC: 1.21 MG/DL (ref 0.67–1.17)
DEPRECATED RDW RBC: 49 FL (ref 39–50)
EOSINOPHIL # BLD: 0 K/MCL (ref 0–0.5)
EOSINOPHIL NFR BLD: 0 %
ERYTHROCYTE [DISTWIDTH] IN BLOOD: 14.5 % (ref 11–15)
FASTING DURATION TIME PATIENT: ABNORMAL H
GFR SERPLBLD BASED ON 1.73 SQ M-ARVRAT: 62 ML/MIN
GLUCOSE SERPL-MCNC: 138 MG/DL (ref 70–99)
GLUCOSE UR STRIP-MCNC: NEGATIVE MG/DL
HCT VFR BLD CALC: 42.6 % (ref 39–51)
HGB BLD-MCNC: 14.8 G/DL (ref 13–17)
HGB UR QL STRIP: NEGATIVE
IMM GRANULOCYTES # BLD AUTO: 0.1 K/MCL (ref 0–0.2)
IMM GRANULOCYTES # BLD: 1 %
KETONES UR STRIP-MCNC: NEGATIVE MG/DL
LEUKOCYTE ESTERASE UR QL STRIP: NEGATIVE
LYMPHOCYTES # BLD: 1.1 K/MCL (ref 1–4)
LYMPHOCYTES NFR BLD: 15 %
MCH RBC QN AUTO: 32 PG (ref 26–34)
MCHC RBC AUTO-ENTMCNC: 34.7 G/DL (ref 32–36.5)
MCV RBC AUTO: 92 FL (ref 78–100)
MONOCYTES # BLD: 0.6 K/MCL (ref 0.3–0.9)
MONOCYTES NFR BLD: 8 %
NEUTROPHILS # BLD: 5.4 K/MCL (ref 1.8–7.7)
NEUTROPHILS NFR BLD: 76 %
NITRITE UR QL STRIP: NEGATIVE
NRBC BLD MANUAL-RTO: 0 /100 WBC
PH UR STRIP: 6.5 [PH] (ref 5–7)
PLATELET # BLD AUTO: 139 K/MCL (ref 140–450)
POTASSIUM SERPL-SCNC: 4.4 MMOL/L (ref 3.4–5.1)
PROT SERPL-MCNC: 7.3 G/DL (ref 6.4–8.2)
PROT UR STRIP-MCNC: NEGATIVE MG/DL
RBC # BLD: 4.63 MIL/MCL (ref 4.5–5.9)
SODIUM SERPL-SCNC: 138 MMOL/L (ref 135–145)
SP GR UR STRIP: 1.01 (ref 1–1.03)
UROBILINOGEN UR STRIP-MCNC: 0.2 MG/DL
WBC # BLD: 7.1 K/MCL (ref 4.2–11)

## 2023-04-23 PROCEDURE — 99284 EMERGENCY DEPT VISIT MOD MDM: CPT

## 2023-04-23 PROCEDURE — G1004 CDSM NDSC: HCPCS

## 2023-04-23 PROCEDURE — 74176 CT ABD & PELVIS W/O CONTRAST: CPT

## 2023-04-23 PROCEDURE — 85025 COMPLETE CBC W/AUTO DIFF WBC: CPT | Performed by: EMERGENCY MEDICINE

## 2023-04-23 PROCEDURE — 80048 BASIC METABOLIC PNL TOTAL CA: CPT | Performed by: EMERGENCY MEDICINE

## 2023-04-23 PROCEDURE — 80076 HEPATIC FUNCTION PANEL: CPT | Performed by: EMERGENCY MEDICINE

## 2023-04-23 PROCEDURE — 81003 URINALYSIS AUTO W/O SCOPE: CPT | Performed by: EMERGENCY MEDICINE

## 2023-04-23 RX ORDER — GLIPIZIDE 2.5 MG/1
TABLET, EXTENDED RELEASE ORAL
COMMUNITY
Start: 2023-03-02

## 2023-04-23 RX ORDER — LOSARTAN POTASSIUM 100 MG/1
100 TABLET ORAL
COMMUNITY
Start: 2023-02-27

## 2023-04-23 RX ORDER — GABAPENTIN 300 MG/1
300 CAPSULE ORAL
COMMUNITY
Start: 2023-02-09

## 2023-04-23 RX ORDER — AMLODIPINE BESYLATE 5 MG/1
5 TABLET ORAL
COMMUNITY
Start: 2023-02-09

## 2023-04-23 RX ORDER — FLUTICASONE PROPIONATE 50 MCG
2 SPRAY, SUSPENSION (ML) NASAL
COMMUNITY
Start: 2022-11-01 | End: 2023-11-26

## 2023-04-23 ASSESSMENT — ENCOUNTER SYMPTOMS
SHORTNESS OF BREATH: 0
COUGH: 0
FEVER: 0
CHILLS: 0
VOMITING: 0
NAUSEA: 0
SORE THROAT: 0
AGITATION: 0
ABDOMINAL PAIN: 0
HEADACHES: 0

## 2023-04-24 LAB
RAINBOW EXTRA TUBES HOLD SPECIMEN: NORMAL

## 2023-05-31 ENCOUNTER — OFFICE VISIT (OUTPATIENT)
Dept: PODIATRY CLINIC | Facility: CLINIC | Age: 78
End: 2023-05-31

## 2023-05-31 DIAGNOSIS — I73.89 OTHER SPECIFIED PERIPHERAL VASCULAR DISEASES (HCC): ICD-10-CM

## 2023-05-31 DIAGNOSIS — N18.30 STAGE 3 CHRONIC KIDNEY DISEASE, UNSPECIFIED WHETHER STAGE 3A OR 3B CKD (HCC): Primary | ICD-10-CM

## 2023-05-31 DIAGNOSIS — L60.0 ONYCHOCRYPTOSIS: ICD-10-CM

## 2023-05-31 DIAGNOSIS — B35.1 ONYCHOMYCOSIS: ICD-10-CM

## 2023-05-31 PROCEDURE — 1126F AMNT PAIN NOTED NONE PRSNT: CPT | Performed by: PODIATRIST

## 2023-05-31 PROCEDURE — 99213 OFFICE O/P EST LOW 20 MIN: CPT | Performed by: PODIATRIST

## 2023-05-31 PROCEDURE — 1159F MED LIST DOCD IN RCRD: CPT | Performed by: PODIATRIST

## 2023-07-11 ENCOUNTER — OFFICE VISIT (OUTPATIENT)
Dept: PODIATRY CLINIC | Facility: CLINIC | Age: 78
End: 2023-07-11

## 2023-07-11 DIAGNOSIS — L60.0 INGROWING TOENAIL WITH INFECTION: ICD-10-CM

## 2023-07-11 DIAGNOSIS — M79.674 PAIN OF TOE OF RIGHT FOOT: ICD-10-CM

## 2023-07-11 DIAGNOSIS — L60.0 ONYCHOCRYPTOSIS: Primary | ICD-10-CM

## 2023-07-11 PROCEDURE — 99213 OFFICE O/P EST LOW 20 MIN: CPT | Performed by: PODIATRIST

## 2023-07-11 PROCEDURE — 1159F MED LIST DOCD IN RCRD: CPT | Performed by: PODIATRIST

## 2023-07-11 RX ORDER — AMOXICILLIN AND CLAVULANATE POTASSIUM 875; 125 MG/1; MG/1
1 TABLET, FILM COATED ORAL 2 TIMES DAILY
Qty: 14 TABLET | Refills: 0 | Status: SHIPPED | OUTPATIENT
Start: 2023-07-11

## 2023-07-16 NOTE — PROGRESS NOTES
Ezequiel Marshall is a 69year old male. Patient presents with: Follow - Up: Right great toe shocking pain 10/10 for 3 days. Redness around great toe        HPI:   Patient presents to the clinic he is got some shooting pain in his right great toe it is about 10 out of 10 for 3 days he is got redness around his great toe proximal at the nail. At today's visit reviewed nurse's history as taken above, allergies medications and medical history as documented below. All changes duly noted  Allergies: Tizanidine   Current Outpatient Medications   Medication Sig Dispense Refill    amoxicillin clavulanate 875-125 MG Oral Tab Take 1 tablet by mouth 2 (two) times daily. 14 tablet 0    mupirocin 2 % External Ointment Apply a small amount to the affected nail edge twice daily after soaking and cover with a Band-Aid, 30 g 0    Methotrexate 7.5 MG/0.3ML Subcutaneous Solution Prefilled Syringe Take 1 Bottle by mouth As Directed. traMADol 50 MG Oral Tab Take 1 tablet (50 mg total) by mouth every 12 (twelve) hours as needed for Pain. 30 tablet 0    HYDROcodone-acetaminophen 5-325 MG Oral Tab Take 1 tablet by mouth every 6 (six) hours as needed for Pain. 30 tablet 0    Multiple Vitamins-Minerals (PRESERVISION AREDS) Oral Tab Take by mouth daily. AMLODIPINE 5 MG Oral Tab TAKE 1 TABLET(5 MG) BY MOUTH DAILY 90 tablet 1    predniSONE 2.5 MG Oral Tab Take 1 tablet (2.5 mg total) by mouth daily. 90 tablet 0    XARELTO 10 MG Oral Tab TAKE 1 TABLET(10 MG) BY MOUTH DAILY WITH FOOD 30 tablet 2    glimepiride 1 MG Oral Tab TAKE 1 TABLET BY MOUTH TWICE DAILY, ALONG WITH 2 MG TABLET TO EQUAL 3  tablet 1    ALPRAZolam 0.25 MG Oral Tab Take 1 tablet (0.25 mg total) by mouth daily. 30 tablet 0    gabapentin 300 MG Oral Cap Take 1 capsule (300 mg total) by mouth in the morning and 1 capsule (300 mg total) before bedtime. metFORMIN 500 MG Oral Tab Take 1 tablet (500 mg total) by mouth 2 (two) times daily.  180 tablet 3    ALLOPURINOL 100 MG Oral Tab TAKE 1 TABLET(100 MG) BY MOUTH DAILY 90 tablet 2    losartan 100 MG Oral Tab Take 1 tablet (100 mg total) by mouth daily. 90 tablet 1    rosuvastatin 5 MG Oral Tab Take 1 tablet (5 mg total) by mouth daily. 90 tablet 3    ACCU-CHEK FASTCLIX LANCETS Does not apply Misc Test once daily 100 each 4    ACCU-CHEK SMARTVIEW In Vitro Strip Test once daily 150 strip 4    Probiotic Product (ALIGN) 4 MG Oral Cap Take 4 mg by mouth daily.           Past Medical History:   Diagnosis Date    BACK PAIN     Back problem     DIABETES     DVT (deep venous thrombosis) ([facility] [address]     bilateral legs- different times though    Gastritis 3/13    H pylori negative    GOUT     Gout     High blood pressure     High cholesterol     HLA B27 (HLA B27 positive)     HYPERLIPIDEMIA     HYPERTENSION     Neuropathy     shoulders arms and into the hands    PE (physical exam), annual     PMR (polymyalgia rheumatica) ([facility] [address]     Pulmonary nodule     repeat CT chest  1/2016    Thrombocytopenia ([facility] [address]     Type II or unspecified type diabetes mellitus without mention of complication, not stated as uncontrolled     Unspecified essential hypertension     Visual impairment     glasses      Past Surgical History:   Procedure Laterality Date    ANAL SPHINCTEROTOMY N/A 11/2/2015    Procedure: EXAM UNDER ANESTHESIA, RECTAL;  Surgeon: Jessica Gardner MD;  Location: [address] [place] [address]    BACK SURGERY  03/15/2018    L3-L5 Decompression, L4-L5 Discectomy uninstrumented fusion     BACK SURGERY  05/24/2018    C5-C7 ACDF     COLONOSCOPY  3/10    polyps, due 2013    COLONOSCOPY  3/9/19= Polyp (TA), Bx: Normal colon    Repeat PRN    COLONOSCOPY N/A 3/9/2019    Procedure: COLONOSCOPY, POSSIBLE BIOPSY, POSSIBLE POLYPECTOMY 29469;  Surgeon: Adiel Glass MD;  Location: [address] [place] [address]  3/8/13= Polyps (TA)    Repeat 2018    COLONOSCOPY,REMV Murtis Divers  3/5/2013    Procedure: ESOPHAGOGASTRODUODENOSCOPY, COLONOSCOPY, POSSIBLE BIOPSY, POSSIBLE POLYPECTOMY 72801,21526;  Surgeon: Mera Martel MD;  Location: [address] [place] [address]    OTHER SURGICAL HISTORY      pilonidal cyst    OTHER SURGICAL HISTORY      hammertoes and bunionb/l     OTHER SURGICAL HISTORY      C-spine fusion    PATIENT DOCUMENTED NOT TO HAVE EXPERIENCED ANY OF THE FOLLOWING EVENTS  3/5/2013    Procedure: ESOPHAGOGASTRODUODENOSCOPY, COLONOSCOPY, POSSIBLE BIOPSY, POSSIBLE POLYPECTOMY 80047,04237;  Surgeon: Mera Martel MD;  Location: [address] [place] [address]    PATIENT Walker Dede FOR IV ANTIBIOTIC SURGICAL SITE INFECTION PROPHYLAXIS.  3/5/2013    Procedure: ESOPHAGOGASTRODUODENOSCOPY, COLONOSCOPY, POSSIBLE BIOPSY, POSSIBLE POLYPECTOMY 52569,82692;  Surgeon: Mera Martel MD;  Location: [address] [place] [address] GI ENDOSCOPY PERFORMED  3/8/13= Gastritis    Normal gastric/duodenal Bx: Normal. No H pylori    UPPER GI ENDOSCOPY,BIOPSY  3/5/2013    Procedure: ESOPHAGOGASTRODUODENOSCOPY, COLONOSCOPY, POSSIBLE BIOPSY, POSSIBLE POLYPECTOMY 10474,73097;  Surgeon: Mera Martel MD;  Location: [address] [place] [address]      Family History   Problem Relation Age of Onset    Other (Other) Mother         scleroderma    Diabetes Neg     Cancer Neg     Heart Disorder Neg     Hypertension Neg     Lipids Neg       Social History    Socioeconomic History      Marital status:     Tobacco Use      Smoking status: Former        Packs/day: 0.50        Years: 50.00        Pack years: 25        Types: Cigars, Cigarettes      Smokeless tobacco: Never    Vaping Use      Vaping Use: Every day    Substance and Sexual Activity      Alcohol use:  Yes        Alcohol/week: 7.0 standard drinks of alcohol        Types: 7 Standard drinks or equivalent per week      Drug use: No          REVIEW OF SYSTEMS:   Today reviewed systens as documented below  GENERAL HEALTH: feels well otherwise  SKIN: Refer to exam below  RESPIRATORY: denies shortness of breath with exertion  CARDIOVASCULAR: denies chest pain on exertion  GI: denies abdominal pain and denies heartburn  NEURO: denies headaches    EXAM:   There were no vitals taken for this visit. GENERAL: well developed, well nourished, in no apparent distress  EXTREMITIES:   1. Integument: Skin is right foot was evaluated its warm and dry. The patient has some erythema edema especially around the medial and lateral nail borders of the right hallux the lateral is the most sensitive. There is associated edema and erythema however there is no drainage noted. 2. Vascular: Patient has a palpable dorsalis pedis pulse on the right but nonpalpable posterior tibial   3. Neurologic: Patient has intact sensorium there is no deficits noted   4. 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 ASSESSMENT AND PLAN:   Diagnoses and all orders for this visit:    Onychocryptosis    Ingrowing toenail with infection    Pain of toe of right foot    Other orders  -     amoxicillin clavulanate 875-125 MG Oral Tab; Take 1 tablet by mouth 2 (two) times daily.  -     mupirocin 2 % External Ointment; Apply a small amount to the affected nail edge twice daily after soaking and cover with a Band-Aid,        Plan: Today trimmed down debrided the onychocryptotic nail edges started him on Augmentin for a week and he will soak his foot twice daily in warm soapy water apply mupirocin ointment and a Band-Aid after each time he soaks and I will see him in follow-up in 1 to 2 weeks for checkup but sooner if required if he thinks infection is getting worse he can come to the office sooner or present to the emergency room or urgent care and have me paged. The patient indicates understanding of these issues and agrees to the plan.     Aden Francois DPM

## 2023-07-19 ENCOUNTER — OFFICE VISIT (OUTPATIENT)
Dept: PODIATRY CLINIC | Facility: CLINIC | Age: 78
End: 2023-07-19

## 2023-07-19 DIAGNOSIS — L60.0 ONYCHOCRYPTOSIS: Primary | ICD-10-CM

## 2023-07-19 DIAGNOSIS — M79.674 PAIN OF TOE OF RIGHT FOOT: ICD-10-CM

## 2023-07-19 DIAGNOSIS — L60.0 INGROWING TOENAIL WITH INFECTION: ICD-10-CM

## 2023-07-19 PROCEDURE — 99213 OFFICE O/P EST LOW 20 MIN: CPT | Performed by: PODIATRIST

## 2023-07-19 PROCEDURE — 1159F MED LIST DOCD IN RCRD: CPT | Performed by: PODIATRIST

## 2023-07-19 PROCEDURE — 1126F AMNT PAIN NOTED NONE PRSNT: CPT | Performed by: PODIATRIST

## 2023-07-23 NOTE — PROGRESS NOTES
Adela Vanegas is a 69year old male. Patient presents with: Follow - Up: Pt in for Right great toe pain, doing much better, completed amoxicillin. HPI:   This gentleman returns to the clinic for follow-up on his right great toe is doing much better does not have the pain he is completed the amoxicillin. At today's visit reviewed nurse's history as taken above, allergies medications and medical history as documented below. All changes duly noted  Allergies: Tizanidine   Current Outpatient Medications   Medication Sig Dispense Refill    amoxicillin clavulanate 875-125 MG Oral Tab Take 1 tablet by mouth 2 (two) times daily. 14 tablet 0    mupirocin 2 % External Ointment Apply a small amount to the affected nail edge twice daily after soaking and cover with a Band-Aid, 30 g 0    traMADol 50 MG Oral Tab Take 1 tablet (50 mg total) by mouth every 12 (twelve) hours as needed for Pain. 30 tablet 0    Multiple Vitamins-Minerals (PRESERVISION AREDS) Oral Tab Take by mouth daily. AMLODIPINE 5 MG Oral Tab TAKE 1 TABLET(5 MG) BY MOUTH DAILY 90 tablet 1    predniSONE 2.5 MG Oral Tab Take 1 tablet (2.5 mg total) by mouth daily. 90 tablet 0    XARELTO 10 MG Oral Tab TAKE 1 TABLET(10 MG) BY MOUTH DAILY WITH FOOD 30 tablet 2    glimepiride 1 MG Oral Tab TAKE 1 TABLET BY MOUTH TWICE DAILY, ALONG WITH 2 MG TABLET TO EQUAL 3  tablet 1    ALPRAZolam 0.25 MG Oral Tab Take 1 tablet (0.25 mg total) by mouth daily. 30 tablet 0    gabapentin 300 MG Oral Cap Take 1 capsule (300 mg total) by mouth in the morning and 1 capsule (300 mg total) before bedtime. metFORMIN 500 MG Oral Tab Take 1 tablet (500 mg total) by mouth 2 (two) times daily. 180 tablet 3    ALLOPURINOL 100 MG Oral Tab TAKE 1 TABLET(100 MG) BY MOUTH DAILY 90 tablet 2    losartan 100 MG Oral Tab Take 1 tablet (100 mg total) by mouth daily. 90 tablet 1    rosuvastatin 5 MG Oral Tab Take 1 tablet (5 mg total) by mouth daily.  90 tablet 3    ACCU-CHEK FASTCLIX LANCETS Does not apply Misc Test once daily 100 each 4    ACCU-CHEK SMARTVIEW In Vitro Strip Test once daily 150 strip 4    Probiotic Product (ALIGN) 4 MG Oral Cap Take 4 mg by mouth daily. HYDROcodone-acetaminophen 5-325 MG Oral Tab Take 1 tablet by mouth every 6 (six) hours as needed for Pain.  (Patient not taking: Reported on 7/19/2023) 30 tablet 0      Past Medical History:   Diagnosis Date    BACK PAIN     Back problem     DIABETES     DVT (deep venous thrombosis) ([facility] [address]     bilateral legs- different times though    Gastritis 3/13    H pylori negative    GOUT     Gout     High blood pressure     High cholesterol     HLA B27 (HLA B27 positive)     HYPERLIPIDEMIA     HYPERTENSION     Neuropathy     shoulders arms and into the hands    PE (physical exam), annual     PMR (polymyalgia rheumatica) ([facility] [address]     Pulmonary nodule     repeat CT chest  1/2016    Thrombocytopenia ([facility] [address]     Type II or unspecified type diabetes mellitus without mention of complication, not stated as uncontrolled     Unspecified essential hypertension     Visual impairment     glasses      Past Surgical History:   Procedure Laterality Date    ANAL SPHINCTEROTOMY N/A 11/2/2015    Procedure: EXAM UNDER ANESTHESIA, RECTAL;  Surgeon: Li Jc MD;  Location: [address] [place] [address]    BACK SURGERY  03/15/2018    L3-L5 Decompression, L4-L5 Discectomy uninstrumented fusion     BACK SURGERY  05/24/2018    C5-C7 ACDF     COLONOSCOPY  3/10    polyps, due 2013    COLONOSCOPY  3/9/19= Polyp (TA), Bx: Normal colon    Repeat PRN    COLONOSCOPY N/A 3/9/2019    Procedure: COLONOSCOPY, POSSIBLE BIOPSY, POSSIBLE POLYPECTOMY 68857;  Surgeon: Walt Rosales MD;  Location: [address] [place] [address]  3/8/13= Polyps (TA)    Repeat 2018    Violet Ponto  3/5/2013    Procedure: ESOPHAGOGASTRODUODENOSCOPY, COLONOSCOPY, POSSIBLE BIOPSY, POSSIBLE POLYPECTOMY 01129,23499;  Surgeon: Walt Rosales MD; Location: [address] [place] [address]    OTHER SURGICAL HISTORY      pilonidal cyst    OTHER SURGICAL HISTORY      hammertoes and bunionb/l     OTHER SURGICAL HISTORY      C-spine fusion    PATIENT DOCUMENTED NOT TO HAVE EXPERIENCED ANY OF THE FOLLOWING EVENTS  3/5/2013    Procedure: ESOPHAGOGASTRODUODENOSCOPY, COLONOSCOPY, POSSIBLE BIOPSY, POSSIBLE POLYPECTOMY 16044,18816;  Surgeon: Melyssa Mobley MD;  Location: [address] [place] [address]    PATIENT [address] FOR IV ANTIBIOTIC SURGICAL SITE INFECTION PROPHYLAXIS.  3/5/2013    Procedure: ESOPHAGOGASTRODUODENOSCOPY, COLONOSCOPY, POSSIBLE BIOPSY, POSSIBLE POLYPECTOMY 53399,56698;  Surgeon: Melyssa Mobley MD;  Location: [address] [place] [address] GI ENDOSCOPY PERFORMED  3/8/13= Gastritis    Normal gastric/duodenal Bx: Normal. No H pylori    UPPER GI ENDOSCOPY,BIOPSY  3/5/2013    Procedure: ESOPHAGOGASTRODUODENOSCOPY, COLONOSCOPY, POSSIBLE BIOPSY, POSSIBLE POLYPECTOMY 48813,33061;  Surgeon: Melyssa Mobley MD;  Location: [address] [place] [address]      Family History   Problem Relation Age of Onset    Other (Other) Mother         scleroderma    Diabetes Neg     Cancer Neg     Heart Disorder Neg     Hypertension Neg     Lipids Neg       Social History    Socioeconomic History      Marital status:     Tobacco Use      Smoking status: Former        Packs/day: 0.50        Years: 50.00        Pack years: 25        Types: Cigars, Cigarettes      Smokeless tobacco: Never    Vaping Use      Vaping Use: Every day    Substance and Sexual Activity      Alcohol use:  Yes        Alcohol/week: 7.0 standard drinks of alcohol        Types: 7 Standard drinks or equivalent per week      Drug use: No          REVIEW OF SYSTEMS:   Today reviewed systens as documented below  GENERAL HEALTH: feels well otherwise  SKIN: Refer to exam below  RESPIRATORY: denies shortness of breath with exertion  CARDIOVASCULAR: denies chest pain on exertion  GI: denies abdominal pain and denies heartburn  NEURO: denies headaches    EXAM:   There were no vitals taken for this visit. GENERAL: well developed, well nourished, in no apparent distress  EXTREMITIES:   1. Integument: Skin on the right foot was evaluated the right hallux nail which was previously inflamed around the eponychium is now improved. No sign of infection present. 2. Vascular: The patient has palpable pulses particular the posterior tibial but the dorsalis pedis is not palpable on the right   3. Neurologic: Patient has intact sensorium   4. Musculoskeletal: Patient has good muscle strength. He has a previous partial fourth ray amputation on the right. ASSESSMENT AND PLAN:   Diagnoses and all orders for this visit:    Onychocryptosis    Ingrowing toenail with infection    Pain of toe of right foot        Plan: Patient may discontinue all local wound care watch for any signs of infection come back and see us if they occur otherwise we will see him for his regular care every 2 to 3 months. The patient indicates understanding of these issues and agrees to the plan.     Terrence Prado DPM

## 2023-08-03 ENCOUNTER — OFFICE VISIT (OUTPATIENT)
Dept: PODIATRY CLINIC | Facility: CLINIC | Age: 78
End: 2023-08-03

## 2023-08-03 DIAGNOSIS — L60.0 OC (ONYCHOCRYPTOSIS): ICD-10-CM

## 2023-08-03 DIAGNOSIS — L60.0 ONYCHOCRYPTOSIS: ICD-10-CM

## 2023-08-03 DIAGNOSIS — L60.0 INGROWING TOENAIL WITH INFECTION: ICD-10-CM

## 2023-08-03 DIAGNOSIS — I73.89 OTHER SPECIFIED PERIPHERAL VASCULAR DISEASES (HCC): Primary | Chronic | ICD-10-CM

## 2023-08-03 DIAGNOSIS — N18.30 STAGE 3 CHRONIC KIDNEY DISEASE, UNSPECIFIED WHETHER STAGE 3A OR 3B CKD (HCC): Chronic | ICD-10-CM

## 2023-08-03 DIAGNOSIS — M79.674 PAIN OF TOE OF RIGHT FOOT: ICD-10-CM

## 2023-08-03 DIAGNOSIS — B35.1 ONYCHOMYCOSIS: ICD-10-CM

## 2023-08-03 PROCEDURE — 99213 OFFICE O/P EST LOW 20 MIN: CPT | Performed by: PODIATRIST

## 2023-08-03 PROCEDURE — 1126F AMNT PAIN NOTED NONE PRSNT: CPT | Performed by: PODIATRIST

## 2023-08-03 PROCEDURE — 1159F MED LIST DOCD IN RCRD: CPT | Performed by: PODIATRIST

## 2023-08-08 NOTE — PROGRESS NOTES
Thelma Blanc is a 69year old male. Patient presents with:  Toenail Care: Nail care and foot check- patient denies pain at this time. HPI:   Patient i with known history of kidney disease as well as peripheral vascular disease returns for evaluation of his feet and nail trimming. His right hallux is now asymptomatic after the antibiotics and soaking. He does complaint of a thickened very dystrophic loosening left great toenail. At today's visit reviewed nurse's history as taken above, allergies medications and medical history as documented below. All changes duly noted  Allergies: Tizanidine   Current Outpatient Medications   Medication Sig Dispense Refill    mupirocin 2 % External Ointment Apply a small amount to the affected nail edge twice daily after soaking and cover with a Band-Aid, 30 g 0    traMADol 50 MG Oral Tab Take 1 tablet (50 mg total) by mouth every 12 (twelve) hours as needed for Pain. 30 tablet 0    HYDROcodone-acetaminophen 5-325 MG Oral Tab Take 1 tablet by mouth every 6 (six) hours as needed for Pain. 30 tablet 0    Multiple Vitamins-Minerals (PRESERVISION AREDS) Oral Tab Take by mouth daily. AMLODIPINE 5 MG Oral Tab TAKE 1 TABLET(5 MG) BY MOUTH DAILY 90 tablet 1    predniSONE 2.5 MG Oral Tab Take 1 tablet (2.5 mg total) by mouth daily. 90 tablet 0    XARELTO 10 MG Oral Tab TAKE 1 TABLET(10 MG) BY MOUTH DAILY WITH FOOD 30 tablet 2    glimepiride 1 MG Oral Tab TAKE 1 TABLET BY MOUTH TWICE DAILY, ALONG WITH 2 MG TABLET TO EQUAL 3  tablet 1    ALPRAZolam 0.25 MG Oral Tab Take 1 tablet (0.25 mg total) by mouth daily. 30 tablet 0    gabapentin 300 MG Oral Cap Take 1 capsule (300 mg total) by mouth in the morning and 1 capsule (300 mg total) before bedtime. metFORMIN 500 MG Oral Tab Take 1 tablet (500 mg total) by mouth 2 (two) times daily.  180 tablet 3    ALLOPURINOL 100 MG Oral Tab TAKE 1 TABLET(100 MG) BY MOUTH DAILY 90 tablet 2    losartan 100 MG Oral Tab Take 1 tablet (100 mg total) by mouth daily. 90 tablet 1    rosuvastatin 5 MG Oral Tab Take 1 tablet (5 mg total) by mouth daily. 90 tablet 3    ACCU-CHEK FASTCLIX LANCETS Does not apply Misc Test once daily 100 each 4    ACCU-CHEK SMARTVIEW In Vitro Strip Test once daily 150 strip 4    Probiotic Product (ALIGN) 4 MG Oral Cap Take 4 mg by mouth daily. amoxicillin clavulanate 875-125 MG Oral Tab Take 1 tablet by mouth 2 (two) times daily.  14 tablet 0      Past Medical History:   Diagnosis Date    BACK PAIN     Back problem     DIABETES     DVT (deep venous thrombosis) ([facility] [address]     bilateral legs- different times though    Gastritis 3/13    H pylori negative    GOUT     Gout     High blood pressure     High cholesterol     HLA B27 (HLA B27 positive)     HYPERLIPIDEMIA     HYPERTENSION     Neuropathy     shoulders arms and into the hands    PE (physical exam), annual     PMR (polymyalgia rheumatica) ([facility] [address]     Pulmonary nodule     repeat CT chest  1/2016    Thrombocytopenia ([facility] [address]     Type II or unspecified type diabetes mellitus without mention of complication, not stated as uncontrolled     Unspecified essential hypertension     Visual impairment     glasses      Past Surgical History:   Procedure Laterality Date    ANAL SPHINCTEROTOMY N/A 11/2/2015    Procedure: EXAM UNDER ANESTHESIA, RECTAL;  Surgeon: Mila Gibbons MD;  Location: [address] [place] [address]    BACK SURGERY  03/15/2018    L3-L5 Decompression, L4-L5 Discectomy uninstrumented fusion     BACK SURGERY  05/24/2018    C5-C7 ACDF     COLONOSCOPY  3/10    polyps, due 2013    COLONOSCOPY  3/9/19= Polyp (TA), Bx: Normal colon    Repeat PRN    COLONOSCOPY N/A 3/9/2019    Procedure: COLONOSCOPY, POSSIBLE BIOPSY, POSSIBLE POLYPECTOMY 17246;  Surgeon: Lima Roche MD;  Location: [address] [place] [address]  3/8/13= Polyps (TA)    Repeat 2018    COLONOSCOPY,ROBERTO Palmer  3/5/2013    Procedure: ESOPHAGOGASTRODUODENOSCOPY, COLONOSCOPY, POSSIBLE BIOPSY, POSSIBLE POLYPECTOMY 33220,13520;  Surgeon: Cristy Huynh MD;  Location: [address] [place] [address]    OTHER SURGICAL HISTORY      pilonidal cyst    OTHER SURGICAL HISTORY      hammertoes and bunionb/l     OTHER SURGICAL HISTORY      C-spine fusion    PATIENT DOCUMENTED NOT TO HAVE EXPERIENCED ANY OF THE FOLLOWING EVENTS  3/5/2013    Procedure: ESOPHAGOGASTRODUODENOSCOPY, COLONOSCOPY, POSSIBLE BIOPSY, POSSIBLE POLYPECTOMY 53996,05165;  Surgeon: Cristy Huynh MD;  Location: [address] [place] [address]    PATIENT Walker Aguayo FOR IV ANTIBIOTIC SURGICAL SITE INFECTION PROPHYLAXIS.  3/5/2013    Procedure: ESOPHAGOGASTRODUODENOSCOPY, COLONOSCOPY, POSSIBLE BIOPSY, POSSIBLE POLYPECTOMY 22058,40097;  Surgeon: Cristy Huynh MD;  Location: [address] [place] [address] GI ENDOSCOPY PERFORMED  3/8/13= Gastritis    Normal gastric/duodenal Bx: Normal. No H pylori    UPPER GI ENDOSCOPY,BIOPSY  3/5/2013    Procedure: ESOPHAGOGASTRODUODENOSCOPY, COLONOSCOPY, POSSIBLE BIOPSY, POSSIBLE POLYPECTOMY 60225,54751;  Surgeon: Cristy Huynh MD;  Location: [address] [place] [address]      Family History   Problem Relation Age of Onset    Other (Other) Mother         scleroderma    Diabetes Neg     Cancer Neg     Heart Disorder Neg     Hypertension Neg     Lipids Neg       Social History    Socioeconomic History      Marital status:     Tobacco Use      Smoking status: Former        Packs/day: 0.50        Years: 50.00        Pack years: 25        Types: Cigars, Cigarettes      Smokeless tobacco: Never    Vaping Use      Vaping Use: Every day    Substance and Sexual Activity      Alcohol use:  Yes        Alcohol/week: 7.0 standard drinks of alcohol        Types: 7 Standard drinks or equivalent per week      Drug use: No          REVIEW OF SYSTEMS:   Today reviewed systens as documented below  GENERAL HEALTH: feels well otherwise  SKIN: Refer to exam below  RESPIRATORY: denies shortness of breath with exertion  CARDIOVASCULAR: denies chest pain on exertion  GI: denies abdominal pain and denies heartburn  NEURO: denies headaches    EXAM:   There were no vitals taken for this visit. GENERAL: well developed, well nourished, in no apparent distress  EXTREMITIES:   1. Integument: The skin on his feet is warm and dry the skin incisions completely healed there is no further drainage the callus on the bottom of his right foot has resolved he is very pleased with the overall result. His toenails 1-5 on the left 1-3 and #5 on the right are thickened and dystrophic. Left hallux nail is Onikul lysed partially on the left foot. There is no sign of infection   2. Vascular: The patient has palpable pulses both dorsalis pedis however posterior tibial is nonpalpable bilateral   3. Neurologic: Patient has intact sensorium   4. 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 ASSESSMENT AND PLAN:   Diagnoses and all orders for this visit:    Other specified peripheral vascular diseases ([facility] [address]    Onychocryptosis    Ingrowing toenail with infection    Pain of toe of right foot    Stage 3 chronic kidney disease, unspecified whether stage 3a or 3b CKD ([facility] [address]    Onychomycosis    OC (onychocryptosis)        Plan: Today trimmed down debrided toenails 1-5 we will see the patient in follow-up in 1 to 2 months for routine care but sooner if required there were no open sores ulcerations or other problems noted at this visit shoe gear as tolerated he can discontinue wearing his orthotics at this time. Today discussed with the patient to keep his feet well moisturized  Advised follow-up as soon as he notices any problems with his feet  If he has questions he can call our office or email us will get back to them. The patient indicates understanding of these issues and agrees to the plan.     Emeli Landeros DPM

## 2023-10-04 ENCOUNTER — OFFICE VISIT (OUTPATIENT)
Dept: PODIATRY CLINIC | Facility: CLINIC | Age: 78
End: 2023-10-04

## 2023-10-04 DIAGNOSIS — M79.674 PAIN OF TOE OF RIGHT FOOT: ICD-10-CM

## 2023-10-04 DIAGNOSIS — N18.30 STAGE 3 CHRONIC KIDNEY DISEASE, UNSPECIFIED WHETHER STAGE 3A OR 3B CKD (HCC): ICD-10-CM

## 2023-10-04 DIAGNOSIS — B35.1 ONYCHOMYCOSIS: ICD-10-CM

## 2023-10-04 DIAGNOSIS — I73.89 OTHER SPECIFIED PERIPHERAL VASCULAR DISEASES (HCC): Primary | ICD-10-CM

## 2023-10-04 DIAGNOSIS — E11.40 DIABETIC NEUROPATHY WITH NEUROLOGIC COMPLICATION (HCC): ICD-10-CM

## 2023-10-04 DIAGNOSIS — E11.49 DIABETIC NEUROPATHY WITH NEUROLOGIC COMPLICATION (HCC): ICD-10-CM

## 2023-10-04 DIAGNOSIS — L60.0 INGROWING TOENAIL WITH INFECTION: ICD-10-CM

## 2023-10-04 DIAGNOSIS — L60.0 ONYCHOCRYPTOSIS: ICD-10-CM

## 2023-10-04 DIAGNOSIS — L60.0 OC (ONYCHOCRYPTOSIS): ICD-10-CM

## 2023-10-04 PROCEDURE — 99213 OFFICE O/P EST LOW 20 MIN: CPT | Performed by: PODIATRIST

## 2023-10-04 PROCEDURE — 1159F MED LIST DOCD IN RCRD: CPT | Performed by: PODIATRIST

## 2023-10-04 NOTE — PROGRESS NOTES
Luz Marina Valdez is a 69year old male. Patient presents with:  Toenail Care: F/u Toenail Care. Elderly patient unable to trim toenail due to medical condition. HPI:   Patient i with known history of kidney disease as well as peripheral vascular disease returns for evaluation of his feet and nail trimming. He does complain of some discomfort in the great toenails but not severe. He does not complain of any sores or ulcers or draining areas on his feet. At today's visit reviewed nurse's history as taken above, allergies medications and medical history as documented below. All changes duly noted  Allergies: Tizanidine   Current Outpatient Medications   Medication Sig Dispense Refill    traMADol 50 MG Oral Tab Take 1 tablet (50 mg total) by mouth every 12 (twelve) hours as needed for Pain. 30 tablet 0    Multiple Vitamins-Minerals (PRESERVISION AREDS) Oral Tab Take by mouth daily. AMLODIPINE 5 MG Oral Tab TAKE 1 TABLET(5 MG) BY MOUTH DAILY 90 tablet 1    predniSONE 2.5 MG Oral Tab Take 1 tablet (2.5 mg total) by mouth daily. 90 tablet 0    XARELTO 10 MG Oral Tab TAKE 1 TABLET(10 MG) BY MOUTH DAILY WITH FOOD 30 tablet 2    ALPRAZolam 0.25 MG Oral Tab Take 1 tablet (0.25 mg total) by mouth daily. 30 tablet 0    gabapentin 300 MG Oral Cap Take 1 capsule (300 mg total) by mouth in the morning and 1 capsule (300 mg total) before bedtime. metFORMIN 500 MG Oral Tab Take 1 tablet (500 mg total) by mouth 2 (two) times daily. 180 tablet 3    ALLOPURINOL 100 MG Oral Tab TAKE 1 TABLET(100 MG) BY MOUTH DAILY 90 tablet 2    losartan 100 MG Oral Tab Take 1 tablet (100 mg total) by mouth daily. 90 tablet 1    rosuvastatin 5 MG Oral Tab Take 1 tablet (5 mg total) by mouth daily. 90 tablet 3    ACCU-CHEK FASTCLIX LANCETS Does not apply Misc Test once daily 100 each 4    ACCU-CHEK SMARTVIEW In Vitro Strip Test once daily 150 strip 4    Probiotic Product (ALIGN) 4 MG Oral Cap Take 4 mg by mouth daily.         glimepiride 1 MG Oral Tab TAKE 1 TABLET BY MOUTH TWICE DAILY, ALONG WITH 2 MG TABLET TO EQUAL 3  tablet 1      Past Medical History:   Diagnosis Date    BACK PAIN     Back problem     DIABETES     DVT (deep venous thrombosis) ([facility]     bilateral legs- different times though    Gastritis 3/13    H pylori negative    GOUT     Gout     High blood pressure     High cholesterol     HLA B27 (HLA B27 positive)     HYPERLIPIDEMIA     HYPERTENSION     Neuropathy     shoulders arms and into the hands    PE (physical exam), annual     PMR (polymyalgia rheumatica) ([facility]     Pulmonary nodule     repeat CT chest  1/2016    Thrombocytopenia ([facility]     Type II or unspecified type diabetes mellitus without mention of complication, not stated as uncontrolled     Unspecified essential hypertension     Visual impairment     glasses      Past Surgical History:   Procedure Laterality Date    ANAL SPHINCTEROTOMY N/A 11/2/2015    Procedure: EXAM UNDER ANESTHESIA, RECTAL;  Surgeon: Juancarlos Rob MD;  Location: [address] [place] [address]    BACK SURGERY  03/15/2018    L3-L5 Decompression, L4-L5 Discectomy uninstrumented fusion     BACK SURGERY  05/24/2018    C5-C7 ACDF     COLONOSCOPY  3/10    polyps, due 2013    COLONOSCOPY  3/9/19= Polyp (TA), Bx: Normal colon    Repeat PRN    COLONOSCOPY N/A 3/9/2019    Procedure: COLONOSCOPY, POSSIBLE BIOPSY, POSSIBLE POLYPECTOMY 55006;  Surgeon: Leon Matute MD;  Location: [address] [place] [address]  3/8/13= Polyps (TA)    Repeat 2018    [facility]  3/5/2013    Procedure: ESOPHAGOGASTRODUODENOSCOPY, COLONOSCOPY, POSSIBLE BIOPSY, POSSIBLE POLYPECTOMY 90244,04404;  Surgeon: Leon Matute MD;  Location: [address] [place] [address]    OTHER SURGICAL HISTORY      pilonidal cyst    OTHER SURGICAL HISTORY      hammertoes and bunionb/l     OTHER SURGICAL HISTORY      C-spine fusion    PATIENT DOCUMENTED NOT TO HAVE EXPERIENCED ANY OF THE FOLLOWING EVENTS  3/5/2013 Procedure: ESOPHAGOGASTRODUODENOSCOPY, COLONOSCOPY, POSSIBLE BIOPSY, POSSIBLE POLYPECTOMY 71657,21999;  Surgeon: Anum Bradshaw MD;  Location: [address] [place] [address]    PATIENT [address] [place] [address] FOR IV ANTIBIOTIC SURGICAL SITE INFECTION PROPHYLAXIS.  3/5/2013    Procedure: ESOPHAGOGASTRODUODENOSCOPY, COLONOSCOPY, POSSIBLE BIOPSY, POSSIBLE POLYPECTOMY 32010,01024;  Surgeon: Anum Bradshaw MD;  Location: [address] [place] [address] GI ENDOSCOPY PERFORMED  3/8/13= Gastritis    Normal gastric/duodenal Bx: Normal. No H pylori    UPPER GI ENDOSCOPY,BIOPSY  3/5/2013    Procedure: ESOPHAGOGASTRODUODENOSCOPY, COLONOSCOPY, POSSIBLE BIOPSY, POSSIBLE POLYPECTOMY 99441,93945;  Surgeon: Anum Bradshaw MD;  Location: [address] [place] [address]      Family History   Problem Relation Age of Onset    Other (Other) Mother         scleroderma    Diabetes Neg     Cancer Neg     Heart Disorder Neg     Hypertension Neg     Lipids Neg       Social History    Socioeconomic History      Marital status:     Tobacco Use      Smoking status: Former        Packs/day: 0.50        Years: 50.00        Additional pack years: 0.00        Total pack years: 25.00        Types: Cigars, Cigarettes      Smokeless tobacco: Never    Vaping Use      Vaping Use: Every day    Substance and Sexual Activity      Alcohol use: Yes        Alcohol/week: 7.0 standard drinks of alcohol        Types: 7 Standard drinks or equivalent per week      Drug use: No          REVIEW OF SYSTEMS:   Today reviewed systens as documented below  GENERAL HEALTH: feels well otherwise  SKIN: Refer to exam below  RESPIRATORY: denies shortness of breath with exertion  CARDIOVASCULAR: denies chest pain on exertion  GI: denies abdominal pain and denies heartburn  NEURO: denies headaches    EXAM:   There were no vitals taken for this visit. GENERAL: well developed, well nourished, in no apparent distress  EXTREMITIES:   1.  Integument: The skin on his feet is warm and dry the skin incisions completely healed there is no further drainage the callus on the bottom of his right foot has resolved he is very pleased with the overall result. His toenails 1-5 on the left 1-3 and #5 on the right are thickened and dystrophic. There are no sores ulcerations or interdigital macerations noted no sign of any sores. 2. Vascular: The patient has palpable pulses both dorsalis pedis however posterior tibial is nonpalpable bilateral   3. Neurologic: Patient has intact sensorium   4. 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 ASSESSMENT AND PLAN:   Diagnoses and all orders for this visit:    Other specified peripheral vascular diseases ([address]    Onychocryptosis    Ingrowing toenail with infection    Pain of toe of right foot    Stage 3 chronic kidney disease, unspecified whether stage 3a or 3b CKD ([address]    Onychomycosis    OC (onychocryptosis)    Diabetic neuropathy with neurologic complication ([address]        Plan: Today trimmed down debrided toenails 1-5 we will see the patient in follow-up in 1 to 2 months for routine care but sooner if required there were no open sores ulcerations or other problems noted at this visit shoe gear as tolerated he can discontinue wearing his orthotics at this time. Today discussed with the patient to keep his feet well moisturized  Advised follow-up as soon as he notices any problems with his feet  If he has questions he can call our office or email us will get back to them. Advised patient to control his blood sugars to help decrease the amount of complications in his foot. The patient indicates understanding of these issues and agrees to the plan.     Cynthia Schwartz DPM

## 2023-11-15 ENCOUNTER — OFFICE VISIT (OUTPATIENT)
Dept: PODIATRY CLINIC | Facility: CLINIC | Age: 78
End: 2023-11-15
Payer: MEDICARE

## 2023-11-15 DIAGNOSIS — M79.675 PAIN OF TOE OF LEFT FOOT: ICD-10-CM

## 2023-11-15 DIAGNOSIS — L60.0 ONYCHOCRYPTOSIS: Primary | ICD-10-CM

## 2023-11-15 DIAGNOSIS — L03.032 SUBUNGUAL ABSCESS OF TOE OF LEFT FOOT: ICD-10-CM

## 2023-11-15 DIAGNOSIS — L60.0 INGROWING TOENAIL WITH INFECTION: ICD-10-CM

## 2023-11-15 PROCEDURE — 99213 OFFICE O/P EST LOW 20 MIN: CPT | Performed by: PODIATRIST

## 2023-11-15 PROCEDURE — 1159F MED LIST DOCD IN RCRD: CPT | Performed by: PODIATRIST

## 2023-11-15 PROCEDURE — 1126F AMNT PAIN NOTED NONE PRSNT: CPT | Performed by: PODIATRIST

## 2023-11-15 NOTE — PROGRESS NOTES
Donna Bautista is a 69year old male. Chief Complaint   Patient presents with    Foot Pain     Right great toe- woke up with shooting pain- redness- patient denies pain at this time- intermittent pain. HPI:   Returns to the clinic with a good chief complaint of his left great toe being painful he gets a shooting pain in it. At today's visit reviewed nurse's history as taken above, allergies medications and medical history as documented below. All changes duly noted  Allergies: Tizanidine   Current Outpatient Medications   Medication Sig Dispense Refill    mupirocin 2 % External Ointment Apply a small amount to the affected nail edge twice daily after soaking and cover with a Band-Aid, 30 g 1    traMADol 50 MG Oral Tab Take 1 tablet (50 mg total) by mouth every 12 (twelve) hours as needed for Pain. 30 tablet 0    Multiple Vitamins-Minerals (PRESERVISION AREDS) Oral Tab Take by mouth daily. AMLODIPINE 5 MG Oral Tab TAKE 1 TABLET(5 MG) BY MOUTH DAILY 90 tablet 1    predniSONE 2.5 MG Oral Tab Take 1 tablet (2.5 mg total) by mouth daily. 90 tablet 0    XARELTO 10 MG Oral Tab TAKE 1 TABLET(10 MG) BY MOUTH DAILY WITH FOOD 30 tablet 2    ALPRAZolam 0.25 MG Oral Tab Take 1 tablet (0.25 mg total) by mouth daily. 30 tablet 0    gabapentin 300 MG Oral Cap Take 1 capsule (300 mg total) by mouth in the morning and 1 capsule (300 mg total) before bedtime. metFORMIN 500 MG Oral Tab Take 1 tablet (500 mg total) by mouth 2 (two) times daily. 180 tablet 3    ALLOPURINOL 100 MG Oral Tab TAKE 1 TABLET(100 MG) BY MOUTH DAILY 90 tablet 2    losartan 100 MG Oral Tab Take 1 tablet (100 mg total) by mouth daily. 90 tablet 1    rosuvastatin 5 MG Oral Tab Take 1 tablet (5 mg total) by mouth daily. 90 tablet 3    ACCU-CHEK FASTCLIX LANCETS Does not apply Misc Test once daily 100 each 4    ACCU-CHEK SMARTVIEW In Vitro Strip Test once daily 150 strip 4    Probiotic Product (ALIGN) 4 MG Oral Cap Take 4 mg by mouth daily. glimepiride 1 MG Oral Tab TAKE 1 TABLET BY MOUTH TWICE DAILY, ALONG WITH 2 MG TABLET TO EQUAL 3  tablet 1      Past Medical History:   Diagnosis Date    BACK PAIN     Back problem     DIABETES     DVT (deep venous thrombosis) ([facility]     bilateral legs- different times though    Gastritis 3/13    H pylori negative    GOUT     Gout     High blood pressure     High cholesterol     HLA B27 (HLA B27 positive)     HYPERLIPIDEMIA     HYPERTENSION     Neuropathy     shoulders arms and into the hands    PE (physical exam), annual     PMR (polymyalgia rheumatica) ([facility]     Pulmonary nodule     repeat CT chest  1/2016    Thrombocytopenia ([facility]     Type II or unspecified type diabetes mellitus without mention of complication, not stated as uncontrolled     Unspecified essential hypertension     Visual impairment     glasses      Past Surgical History:   Procedure Laterality Date    ANAL SPHINCTEROTOMY N/A 11/2/2015    Procedure: EXAM UNDER ANESTHESIA, RECTAL;  Surgeon: Pauline Martinez MD;  Location: [address] [place] [address]    BACK SURGERY  03/15/2018    L3-L5 Decompression, L4-L5 Discectomy uninstrumented fusion     BACK SURGERY  05/24/2018    C5-C7 ACDF     COLONOSCOPY  3/10    polyps, due 2013    COLONOSCOPY  3/9/19= Polyp (TA), Bx: Normal colon    Repeat PRN    COLONOSCOPY N/A 3/9/2019    Procedure: COLONOSCOPY, POSSIBLE BIOPSY, POSSIBLE POLYPECTOMY 32982;  Surgeon: Anum Bradshaw MD;  Location: [address] [place] [address]    COLONOSCOPY & POLYPECTOMY  3/8/13= Polyps (TA)    Repeat 2018    [facility]  3/5/2013    Procedure: ESOPHAGOGASTRODUODENOSCOPY, COLONOSCOPY, POSSIBLE BIOPSY, POSSIBLE POLYPECTOMY 76767,47826;  Surgeon: Anum Bradshaw MD;  Location: [address] [place] [address]    OTHER SURGICAL HISTORY      pilonidal cyst    OTHER SURGICAL HISTORY      hammertoes and bunionb/l     OTHER SURGICAL HISTORY      C-spine fusion    PATIENT DOCUMENTED NOT TO HAVE EXPERIENCED ANY OF THE FOLLOWING EVENTS  3/5/2013    Procedure: ESOPHAGOGASTRODUODENOSCOPY, COLONOSCOPY, POSSIBLE BIOPSY, POSSIBLE POLYPECTOMY 18678,23640;  Surgeon: Guillermo Carr MD;  Location: [address] [place] [address]    PATIENT [address] [place] [address] FOR IV ANTIBIOTIC SURGICAL SITE INFECTION PROPHYLAXIS.  3/5/2013    Procedure: ESOPHAGOGASTRODUODENOSCOPY, COLONOSCOPY, POSSIBLE BIOPSY, POSSIBLE POLYPECTOMY 85701,97699;  Surgeon: Guillermo Carr MD;  Location: [address] [place] [address] GI ENDOSCOPY PERFORMED  3/8/13= Gastritis    Normal gastric/duodenal Bx: Normal. No H pylori    UPPER GI ENDOSCOPY,BIOPSY  3/5/2013    Procedure: ESOPHAGOGASTRODUODENOSCOPY, COLONOSCOPY, POSSIBLE BIOPSY, POSSIBLE POLYPECTOMY 51297,88725;  Surgeon: Guillermo Carr MD;  Location: [address] [place] [address]      Family History   Problem Relation Age of Onset    Other (Other) Mother         scleroderma    Diabetes Neg     Cancer Neg     Heart Disorder Neg     Hypertension Neg     Lipids Neg       Social History     Socioeconomic History    Marital status:    Tobacco Use    Smoking status: Former     Packs/day: 0.50     Years: 50.00     Additional pack years: 0.00     Total pack years: 25.00     Types: Cigars, Cigarettes    Smokeless tobacco: Never   Vaping Use    Vaping Use: Every day   Substance and Sexual Activity    Alcohol use: Yes     Alcohol/week: 7.0 standard drinks of alcohol     Types: 7 Standard drinks or equivalent per week    Drug use: No           REVIEW OF SYSTEMS:   Today reviewed systens as documented below  GENERAL HEALTH: feels well otherwise  SKIN: Refer to exam below  RESPIRATORY: denies shortness of breath with exertion  CARDIOVASCULAR: denies chest pain on exertion  GI: denies abdominal pain and denies heartburn  NEURO: denies headaches    EXAM:   There were no vitals taken for this visit. GENERAL: well developed, well nourished, in no apparent distress  EXTREMITIES:   1.  Integument: The skin on his left foot was evaluated is warm and dry patient has a very thickened dystrophic left hallux nail which on palpation of the lateral side of it is painful trimming and debriding shows there is a small subungual ulceration there is no erythema and no edema   2. Vascular: The patient has palpable dorsalis pedis and posterior tibial pulses on the left   3. Neurologic: The patient has intact sensorium   4. Musculoskeletal: Patient has good muscle strength. ASSESSMENT AND PLAN:   Diagnoses and all orders for this visit:    Onychocryptosis    Ingrowing toenail with infection    Subungual abscess of toe of left foot    Pain of toe of left foot    Other orders  -     mupirocin 2 % External Ointment; Apply a small amount to the affected nail edge twice daily after soaking and cover with a Band-Aid,        Plan: Today trimmed down debrided the nail over the ulcerated area which provided him almost instantaneous relief. This does not appear to be overtly infected therefore he will just soak twice daily in warm soapy water keep his appointment on December 5 he will apply mupirocin ointment and a Band-Aid afterwards. The patient indicates understanding of these issues and agrees to the plan.     Analia Eagle DPM

## 2023-12-05 ENCOUNTER — OFFICE VISIT (OUTPATIENT)
Dept: PODIATRY CLINIC | Facility: CLINIC | Age: 78
End: 2023-12-05
Payer: MEDICARE

## 2023-12-05 DIAGNOSIS — I73.89 OTHER SPECIFIED PERIPHERAL VASCULAR DISEASES (HCC): ICD-10-CM

## 2023-12-05 DIAGNOSIS — N18.30 STAGE 3 CHRONIC KIDNEY DISEASE, UNSPECIFIED WHETHER STAGE 3A OR 3B CKD (HCC): ICD-10-CM

## 2023-12-05 DIAGNOSIS — E11.49 DIABETIC NEUROPATHY WITH NEUROLOGIC COMPLICATION (HCC): ICD-10-CM

## 2023-12-05 DIAGNOSIS — E11.40 DIABETIC NEUROPATHY WITH NEUROLOGIC COMPLICATION (HCC): ICD-10-CM

## 2023-12-05 DIAGNOSIS — B35.1 ONYCHOMYCOSIS: ICD-10-CM

## 2023-12-05 DIAGNOSIS — B35.1 OM (ONYCHOMYCOSIS): ICD-10-CM

## 2023-12-05 DIAGNOSIS — L60.0 ONYCHOCRYPTOSIS: Primary | ICD-10-CM

## 2023-12-05 DIAGNOSIS — L60.0 OC (ONYCHOCRYPTOSIS): ICD-10-CM

## 2023-12-05 PROCEDURE — 99213 OFFICE O/P EST LOW 20 MIN: CPT | Performed by: PODIATRIST

## 2023-12-05 PROCEDURE — 1126F AMNT PAIN NOTED NONE PRSNT: CPT | Performed by: PODIATRIST

## 2023-12-05 PROCEDURE — 1159F MED LIST DOCD IN RCRD: CPT | Performed by: PODIATRIST

## 2023-12-05 NOTE — PROGRESS NOTES
Donna Bautista is a 69year old male. Chief Complaint   Patient presents with    Diabetic Foot Care     Nail care and foot check- fbs was not checked- A1c 5.4 06/20/23         HPI:   Patient i with known history of kidney disease as well as peripheral vascular disease returns for evaluation of his feet and nail trimming. He does complaint of a thickened very dystrophic loosening left great toenail. He indicates his left great toenail is completely healed he is doing fine with that now. At today's visit reviewed nurse's history as taken above, allergies medications and medical history as documented below. All changes duly noted  Allergies: Tizanidine   Current Outpatient Medications   Medication Sig Dispense Refill    traMADol 50 MG Oral Tab Take 1 tablet (50 mg total) by mouth every 12 (twelve) hours as needed for Pain. 30 tablet 0    Multiple Vitamins-Minerals (PRESERVISION AREDS) Oral Tab Take by mouth daily. AMLODIPINE 5 MG Oral Tab TAKE 1 TABLET(5 MG) BY MOUTH DAILY 90 tablet 1    predniSONE 2.5 MG Oral Tab Take 1 tablet (2.5 mg total) by mouth daily. 90 tablet 0    XARELTO 10 MG Oral Tab TAKE 1 TABLET(10 MG) BY MOUTH DAILY WITH FOOD 30 tablet 2    ALPRAZolam 0.25 MG Oral Tab Take 1 tablet (0.25 mg total) by mouth daily. 30 tablet 0    gabapentin 300 MG Oral Cap Take 1 capsule (300 mg total) by mouth in the morning and 1 capsule (300 mg total) before bedtime. metFORMIN 500 MG Oral Tab Take 1 tablet (500 mg total) by mouth 2 (two) times daily. 180 tablet 3    ALLOPURINOL 100 MG Oral Tab TAKE 1 TABLET(100 MG) BY MOUTH DAILY 90 tablet 2    losartan 100 MG Oral Tab Take 1 tablet (100 mg total) by mouth daily. 90 tablet 1    rosuvastatin 5 MG Oral Tab Take 1 tablet (5 mg total) by mouth daily.  90 tablet 3    ACCU-CHEK FASTCLIX LANCETS Does not apply Misc Test once daily 100 each 4    ACCU-CHEK SMARTVIEW In Vitro Strip Test once daily 150 strip 4    Probiotic Product (ALIGN) 4 MG Oral Cap Take 4 mg by mouth daily.         mupirocin 2 % External Ointment Apply a small amount to the affected nail edge twice daily after soaking and cover with a Band-Aid, 30 g 1    glimepiride 1 MG Oral Tab TAKE 1 TABLET BY MOUTH TWICE DAILY, ALONG WITH 2 MG TABLET TO EQUAL 3  tablet 1      Past Medical History:   Diagnosis Date    BACK PAIN     Back problem     DIABETES     DVT (deep venous thrombosis) ([facility]     bilateral legs- different times though    Gastritis 3/13    H pylori negative    GOUT     Gout     High blood pressure     High cholesterol     HLA B27 (HLA B27 positive)     HYPERLIPIDEMIA     HYPERTENSION     Neuropathy     shoulders arms and into the hands    PE (physical exam), annual     PMR (polymyalgia rheumatica) ([facility]     Pulmonary nodule     repeat CT chest  1/2016    Thrombocytopenia ([facility]     Type II or unspecified type diabetes mellitus without mention of complication, not stated as uncontrolled     Unspecified essential hypertension     Visual impairment     glasses      Past Surgical History:   Procedure Laterality Date    ANAL SPHINCTEROTOMY N/A 11/2/2015    Procedure: EXAM UNDER ANESTHESIA, RECTAL;  Surgeon: Daiana Hernandez MD;  Location: [address] [place] [address]    BACK SURGERY  03/15/2018    L3-L5 Decompression, L4-L5 Discectomy uninstrumented fusion     BACK SURGERY  05/24/2018    C5-C7 ACDF     COLONOSCOPY  3/10    polyps, due 2013    COLONOSCOPY  3/9/19= Polyp (TA), Bx: Normal colon    Repeat PRN    COLONOSCOPY N/A 3/9/2019    Procedure: COLONOSCOPY, POSSIBLE BIOPSY, POSSIBLE POLYPECTOMY 87959;  Surgeon: Laurie San MD;  Location: [address] [place] [address]  3/8/13= Polyps (TA)    Repeat 2018    [facility]  3/5/2013    Procedure: ESOPHAGOGASTRODUODENOSCOPY, COLONOSCOPY, POSSIBLE BIOPSY, POSSIBLE POLYPECTOMY 42019,18080;  Surgeon: Laurie San MD;  Location: [address] [place] [address]    OTHER SURGICAL HISTORY      pilonidal cyst    OTHER SURGICAL HISTORY      hammertoes and bunionb/l     OTHER SURGICAL HISTORY      C-spine fusion    PATIENT DOCUMENTED NOT TO HAVE EXPERIENCED ANY OF THE FOLLOWING EVENTS  3/5/2013    Procedure: ESOPHAGOGASTRODUODENOSCOPY, COLONOSCOPY, POSSIBLE BIOPSY, POSSIBLE POLYPECTOMY 89740,37912;  Surgeon: Mally Kim MD;  Location: [address] [place] [address]    PATIENT Walker Aguayo FOR IV ANTIBIOTIC SURGICAL SITE INFECTION PROPHYLAXIS.  3/5/2013    Procedure: ESOPHAGOGASTRODUODENOSCOPY, COLONOSCOPY, POSSIBLE BIOPSY, POSSIBLE POLYPECTOMY 45123,08920;  Surgeon: Mally Kim MD;  Location: [address] [place] [address] GI ENDOSCOPY PERFORMED  3/8/13= Gastritis    Normal gastric/duodenal Bx: Normal. No H pylori    UPPER GI ENDOSCOPY,BIOPSY  3/5/2013    Procedure: ESOPHAGOGASTRODUODENOSCOPY, COLONOSCOPY, POSSIBLE BIOPSY, POSSIBLE POLYPECTOMY 05166,14939;  Surgeon: Mally Kim MD;  Location: [address] [place] [address]      Family History   Problem Relation Age of Onset    Other (Other) Mother         scleroderma    Diabetes Neg     Cancer Neg     Heart Disorder Neg     Hypertension Neg     Lipids Neg       Social History     Socioeconomic History    Marital status:    Tobacco Use    Smoking status: Former     Packs/day: 0.50     Years: 50.00     Additional pack years: 0.00     Total pack years: 25.00     Types: Cigars, Cigarettes    Smokeless tobacco: Never   Vaping Use    Vaping Use: Every day   Substance and Sexual Activity    Alcohol use:  Yes     Alcohol/week: 7.0 standard drinks of alcohol     Types: 7 Standard drinks or equivalent per week    Drug use: No           REVIEW OF SYSTEMS:   Today reviewed systens as documented below  GENERAL HEALTH: feels well otherwise  SKIN: Refer to exam below  RESPIRATORY: denies shortness of breath with exertion  CARDIOVASCULAR: denies chest pain on exertion  GI: denies abdominal pain and denies heartburn  NEURO: denies headaches    EXAM: There were no vitals taken for this visit. GENERAL: well developed, well nourished, in no apparent distress  EXTREMITIES:   1. Integument: The skin on his feet is warm and dry the skin incisions completely healed there is no further drainage the callus on the bottom of his right foot has resolved he is very pleased with the overall result. His toenails 1-5 on the left 1-3 and #5 on the right are thickened and dystrophic. Left hallux nail is Onikul lysed partially on the left foot. There is no sign of infection   2. Vascular: The patient has palpable pulses both dorsalis pedis however posterior tibial is nonpalpable bilateral   3. Neurologic: Patient has intact sensorium   4. 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 ASSESSMENT AND PLAN:   Diagnoses and all orders for this visit:    Onychocryptosis    Stage 3 chronic kidney disease, unspecified whether stage 3a or 3b CKD ([address]    Other specified peripheral vascular diseases ([address]    Onychomycosis    OC (onychocryptosis)    Diabetic neuropathy with neurologic complication ([address]    OM (onychomycosis)        Plan: Today trimmed down debrided toenails 1-5 we will see the patient in follow-up in 1 to 2 months for routine care but sooner if required there were no open sores ulcerations or other problems noted at this visit shoe gear as tolerated he can discontinue wearing his orthotics at this time. Today discussed with the patient to keep his feet well moisturized  Advised follow-up as soon as he notices any problems with his feet  If he has questions he can call our office or email us will get back to them. The patient indicates understanding of these issues and agrees to the plan.     Aden Francois DPM

## 2024-01-08 ENCOUNTER — TELEPHONE (OUTPATIENT)
Dept: PODIATRY CLINIC | Facility: CLINIC | Age: 79
End: 2024-01-08

## 2024-01-08 NOTE — TELEPHONE ENCOUNTER
Patient requesting to speak to Clotilde has question regarding a bandage would like to get more.Please advise

## 2024-01-09 NOTE — TELEPHONE ENCOUNTER
Spoke with patient. Discovered he was looking for how to find the U shaped pads that pad your feet. Advised they are called moleskin can be found on [address] or at the drug store. Patient was satisfied with resolution. No further questions at this time.

## 2024-02-06 ENCOUNTER — OFFICE VISIT (OUTPATIENT)
Dept: PODIATRY CLINIC | Facility: CLINIC | Age: 79
End: 2024-02-06
Payer: MEDICARE

## 2024-02-06 DIAGNOSIS — B35.1 ONYCHOMYCOSIS: ICD-10-CM

## 2024-02-06 DIAGNOSIS — B35.1 OM (ONYCHOMYCOSIS): ICD-10-CM

## 2024-02-06 DIAGNOSIS — L60.0 INGROWING TOENAIL WITH INFECTION: ICD-10-CM

## 2024-02-06 DIAGNOSIS — L60.0 OC (ONYCHOCRYPTOSIS): ICD-10-CM

## 2024-02-06 DIAGNOSIS — E11.49 DIABETIC NEUROPATHY WITH NEUROLOGIC COMPLICATION (HCC): ICD-10-CM

## 2024-02-06 DIAGNOSIS — I73.89 OTHER SPECIFIED PERIPHERAL VASCULAR DISEASES (HCC): ICD-10-CM

## 2024-02-06 DIAGNOSIS — M79.674 PAIN OF TOE OF RIGHT FOOT: ICD-10-CM

## 2024-02-06 DIAGNOSIS — N18.30 STAGE 3 CHRONIC KIDNEY DISEASE, UNSPECIFIED WHETHER STAGE 3A OR 3B CKD (HCC): ICD-10-CM

## 2024-02-06 DIAGNOSIS — L60.0 ONYCHOCRYPTOSIS: Primary | ICD-10-CM

## 2024-02-06 DIAGNOSIS — E11.40 DIABETIC NEUROPATHY WITH NEUROLOGIC COMPLICATION (HCC): ICD-10-CM

## 2024-02-06 PROCEDURE — 99213 OFFICE O/P EST LOW 20 MIN: CPT | Performed by: PODIATRIST

## 2024-02-06 NOTE — PROGRESS NOTES
You Titus is a 78 year old male.   Chief Complaint   Patient presents with    Diabetic Foot Care     Nail care and foot check-No FBS taken today* A1C 5.4 on 12/22.         HPI:   Clinic patient returns to the clinic he been suffering from a painful ingrown toenail he points to the medial right great toenail.  We have called in a prescription for hydrocodone form so he can sleep at night he never filled it.  He had just been soaking it.  At today's visit reviewed nurse's history as taken above, allergies medications and medical history as documented below.  All changes duly noted  Allergies: Tizanidine   Current Outpatient Medications   Medication Sig Dispense Refill    HYDROcodone-acetaminophen 5-325 MG Oral Tab Take 1 tablet by mouth every 6 (six) hours as needed for Pain. 10 tablet 0    traMADol 50 MG Oral Tab Take 1 tablet (50 mg total) by mouth every 12 (twelve) hours as needed for Pain. 30 tablet 0    Multiple Vitamins-Minerals (PRESERVISION AREDS) Oral Tab Take by mouth daily.      AMLODIPINE 5 MG Oral Tab TAKE 1 TABLET(5 MG) BY MOUTH DAILY 90 tablet 1    predniSONE 2.5 MG Oral Tab Take 1 tablet (2.5 mg total) by mouth daily. 90 tablet 0    XARELTO 10 MG Oral Tab TAKE 1 TABLET(10 MG) BY MOUTH DAILY WITH FOOD 30 tablet 2    ALPRAZolam 0.25 MG Oral Tab Take 1 tablet (0.25 mg total) by mouth daily. 30 tablet 0    gabapentin 300 MG Oral Cap Take 1 capsule (300 mg total) by mouth in the morning and 1 capsule (300 mg total) before bedtime.      metFORMIN 500 MG Oral Tab Take 1 tablet (500 mg total) by mouth 2 (two) times daily. 180 tablet 3    ALLOPURINOL 100 MG Oral Tab TAKE 1 TABLET(100 MG) BY MOUTH DAILY 90 tablet 2    losartan 100 MG Oral Tab Take 1 tablet (100 mg total) by mouth daily. 90 tablet 1    rosuvastatin 5 MG Oral Tab Take 1 tablet (5 mg total) by mouth daily. 90 tablet 3    ACCU-CHEK FASTCLIX LANCETS Does not apply Misc Test once daily 100 each 4    ACCU-CHEK SMARTVIEW In Vitro Strip Test once  daily 150 strip 4    Probiotic Product (ALIGN) 4 MG Oral Cap Take 4 mg by mouth daily.        mupirocin 2 % External Ointment Apply a small amount to the affected nail edge twice daily after soaking and cover with a Band-Aid, 30 g 1    glimepiride 1 MG Oral Tab TAKE 1 TABLET BY MOUTH TWICE DAILY, ALONG WITH 2 MG TABLET TO EQUAL 3  tablet 1      Past Medical History:   Diagnosis Date    BACK PAIN     Back problem     DIABETES     DVT (deep venous thrombosis) (HCC)     bilateral legs- different times though    Gastritis 3/13    H pylori negative    GOUT     Gout     High blood pressure     High cholesterol     HLA B27 (HLA B27 positive)     HYPERLIPIDEMIA     HYPERTENSION     Neuropathy     shoulders arms and into the hands    PE (physical exam), annual     PMR (polymyalgia rheumatica) (HCC)     Pulmonary nodule     repeat CT chest  1/2016    Thrombocytopenia (HCC)     Type II or unspecified type diabetes mellitus without mention of complication, not stated as uncontrolled     Unspecified essential hypertension     Visual impairment     glasses      Past Surgical History:   Procedure Laterality Date    ANAL SPHINCTEROTOMY N/A 11/2/2015    Procedure: EXAM UNDER ANESTHESIA, RECTAL;  Surgeon: Eben Ann MD;  Location: [facility]    BACK SURGERY  03/15/2018    L3-L5 Decompression, L4-L5 Discectomy uninstrumented fusion     BACK SURGERY  05/24/2018    C5-C7 ACDF     COLONOSCOPY  3/10    polyps, due 2013    COLONOSCOPY  3/9/19= Polyp (TA), Bx: Normal colon    Repeat PRN    COLONOSCOPY N/A 3/9/2019    Procedure: COLONOSCOPY, POSSIBLE BIOPSY, POSSIBLE POLYPECTOMY 45940;  Surgeon: Camden Corado MD;  Location: [facility]    COLONOSCOPY & POLYPECTOMY  3/8/13= Polyps (TA)    Repeat 2018    COLONOSCOPY,REMV LESCAITLYN,SNARE  3/5/2013    Procedure: ESOPHAGOGASTRODUODENOSCOPY, COLONOSCOPY, POSSIBLE BIOPSY, POSSIBLE POLYPECTOMY [id number];  Surgeon: Camden Corado MD;  [facility] [place]  [facility]    OTHER SURGICAL HISTORY      pilonidal cyst    OTHER SURGICAL HISTORY      hammertoes and bunionb/l     OTHER SURGICAL HISTORY      C-spine fusion    PATIENT DOCUMENTED NOT TO HAVE EXPERIENCED ANY OF THE FOLLOWING EVENTS  3/5/2013    Procedure: ESOPHAGOGASTRODUODENOSCOPY, COLONOSCOPY, POSSIBLE BIOPSY, POSSIBLE POLYPECTOMY [id number];  Surgeon: Camden Corado MD;  Location: [facility]    PATIENT WITHOUGH PREOPERATIVE ORDER FOR IV ANTIBIOTIC SURGICAL SITE INFECTION PROPHYLAXIS.  3/5/2013    Procedure: ESOPHAGOGASTRODUODENOSCOPY, COLONOSCOPY, POSSIBLE BIOPSY, POSSIBLE POLYPECTOMY 04552,[id number];  Surgeon: Camden Corado MD;  Location: [facility]    TONSILLECTOMY      UPPER GI ENDOSCOPY PERFORMED  3/8/13= Gastritis    Normal gastric/duodenal Bx: Normal. No H pylori    UPPER GI ENDOSCOPY,BIOPSY  3/5/2013    Procedure: ESOPHAGOGASTRODUODENOSCOPY, COLONOSCOPY, POSSIBLE BIOPSY, POSSIBLE POLYPECTOMY 76880,[id number];  Surgeon: Camden Corado MD;  Location: [facility]      Family History   Problem Relation Age of Onset    Other (Other) Mother         scleroderma    Diabetes Neg     Cancer Neg     Heart Disorder Neg     Hypertension Neg     Lipids Neg       Social History     Socioeconomic History    Marital status:    Tobacco Use    Smoking status: Former     Packs/day: 0.50     Years: 50.00     Additional pack years: 0.00     Total pack years: 25.00     Types: Cigars, Cigarettes    Smokeless tobacco: Never   Vaping Use    Vaping Use: Every day   Substance and Sexual Activity    Alcohol use: Yes     Alcohol/week: 7.0 standard drinks of alcohol     Types: 7 Standard drinks or equivalent per week    Drug use: No           REVIEW OF SYSTEMS:   Today reviewed systens as documented below  GENERAL HEALTH: feels well otherwise  SKIN: Refer to exam below  RESPIRATORY: denies shortness of breath with exertion  CARDIOVASCULAR: denies chest pain on exertion  GI: denies  abdominal pain and denies heartburn  NEURO: denies headaches    EXAM:   There were no vitals taken for this visit.  GENERAL: well developed, well nourished, in no apparent distress  EXTREMITIES:   1. Integument: Skin on both feet was evaluated at school it is dry.  He has surgical scars from a fourth metatarsal resection and fourth toe resection on the right foot.  His nails are thickened and dystrophic 1-3 and 5 on the right and 1-5 on the left there is incurvation of the medial lateral nail borders of the hallux bilateral there is hyperkeratotic impaction discomfort on the medial nail border of the right hallux   2. Vascular: Patient has palpable dorsalis pedis pulse but weakened does have a known history of vascular disease   3. Neurologic: Has intact sensorium some numbness tingling   4. 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    ASSESSMENT AND PLAN:   Diagnoses and all orders for this visit:    Onychocryptosis    Ingrowing toenail with infection    Pain of toe of right foot    Stage 3 chronic kidney disease, unspecified whether stage 3a or 3b CKD (HCC)    Other specified peripheral vascular diseases (HCC)    Onychomycosis    OC (onychocryptosis)    Diabetic neuropathy with neurologic complication (HCC)    OM (onychomycosis)        Plan: At today's office visit trimmed down and debrided toenails were present as documented above using a slant back technique on the hallux.  At today's visit I reminded the patient about daily foot checks  Reminded patient again of proper control of his diabetes to help prevent any severe complications in his feet  Proper foot hygiene moisturizing except between the toes was reviewed  Advised follow-up again every 2 to 3 months for routine care but emphasized to him the importance of coming in sooner if he notices any problems.    The patient indicates understanding of these issues and agrees to the plan.    Anibal Crum DPM

## 2024-04-24 ENCOUNTER — OFFICE VISIT (OUTPATIENT)
Dept: PODIATRY CLINIC | Facility: CLINIC | Age: 79
End: 2024-04-24
Payer: MEDICARE

## 2024-04-24 DIAGNOSIS — L60.0 OC (ONYCHOCRYPTOSIS): ICD-10-CM

## 2024-04-24 DIAGNOSIS — I73.89 OTHER SPECIFIED PERIPHERAL VASCULAR DISEASES (HCC): ICD-10-CM

## 2024-04-24 DIAGNOSIS — L60.0 ONYCHOCRYPTOSIS: ICD-10-CM

## 2024-04-24 DIAGNOSIS — E11.49 DIABETIC NEUROPATHY WITH NEUROLOGIC COMPLICATION (HCC): ICD-10-CM

## 2024-04-24 DIAGNOSIS — B35.1 OM (ONYCHOMYCOSIS): ICD-10-CM

## 2024-04-24 DIAGNOSIS — E11.40 DIABETIC NEUROPATHY WITH NEUROLOGIC COMPLICATION (HCC): ICD-10-CM

## 2024-04-24 DIAGNOSIS — M79.674 PAIN OF TOE OF RIGHT FOOT: ICD-10-CM

## 2024-04-24 DIAGNOSIS — L60.0 INGROWING TOENAIL WITH INFECTION: ICD-10-CM

## 2024-04-24 DIAGNOSIS — B35.1 ONYCHOMYCOSIS: ICD-10-CM

## 2024-04-24 DIAGNOSIS — N18.30 STAGE 3 CHRONIC KIDNEY DISEASE, UNSPECIFIED WHETHER STAGE 3A OR 3B CKD (HCC): Primary | ICD-10-CM

## 2024-04-24 PROCEDURE — 99213 OFFICE O/P EST LOW 20 MIN: CPT | Performed by: PODIATRIST

## 2024-04-24 NOTE — PROGRESS NOTES
You Titus is a 78 year old male.   Chief Complaint   Patient presents with    Toenail Care     Nail care and foot- fbg was not checked- A1c 5.4 12/22         HPI:   Patient returns to clinic for routine care.  He does not have too many complaints.  But his nails need trimming he has a known history of peripheral vascular disease.  He has started gel injections in his knees.  At today's visit reviewed nurse's history as taken above, allergies medications and medical history as documented below.  All changes duly noted  Allergies: Tizanidine   Current Outpatient Medications   Medication Sig Dispense Refill    HYDROcodone-acetaminophen 5-325 MG Oral Tab Take 1 tablet by mouth every 6 (six) hours as needed for Pain. 10 tablet 0    traMADol 50 MG Oral Tab Take 1 tablet (50 mg total) by mouth every 12 (twelve) hours as needed for Pain. 30 tablet 0    Multiple Vitamins-Minerals (PRESERVISION AREDS) Oral Tab Take by mouth daily.      AMLODIPINE 5 MG Oral Tab TAKE 1 TABLET(5 MG) BY MOUTH DAILY 90 tablet 1    predniSONE 2.5 MG Oral Tab Take 1 tablet (2.5 mg total) by mouth daily. 90 tablet 0    XARELTO 10 MG Oral Tab TAKE 1 TABLET(10 MG) BY MOUTH DAILY WITH FOOD 30 tablet 2    ALPRAZolam 0.25 MG Oral Tab Take 1 tablet (0.25 mg total) by mouth daily. 30 tablet 0    gabapentin 300 MG Oral Cap Take 1 capsule (300 mg total) by mouth in the morning and 1 capsule (300 mg total) before bedtime.      metFORMIN 500 MG Oral Tab Take 1 tablet (500 mg total) by mouth 2 (two) times daily. 180 tablet 3    ALLOPURINOL 100 MG Oral Tab TAKE 1 TABLET(100 MG) BY MOUTH DAILY 90 tablet 2    losartan 100 MG Oral Tab Take 1 tablet (100 mg total) by mouth daily. 90 tablet 1    rosuvastatin 5 MG Oral Tab Take 1 tablet (5 mg total) by mouth daily. 90 tablet 3    ACCU-CHEK FASTCLIX LANCETS Does not apply Misc Test once daily 100 each 4    ACCU-CHEK SMARTVIEW In Vitro Strip Test once daily 150 strip 4    Probiotic Product (ALIGN) 4 MG Oral Cap Take  4 mg by mouth daily.        mupirocin 2 % External Ointment Apply a small amount to the affected nail edge twice daily after soaking and cover with a Band-Aid, 30 g 1    glimepiride 1 MG Oral Tab TAKE 1 TABLET BY MOUTH TWICE DAILY, ALONG WITH 2 MG TABLET TO EQUAL 3  tablet 1      Past Medical History:    BACK PAIN    Back problem    DIABETES    DVT (deep venous thrombosis) (HCC)    bilateral legs- different times though    Gastritis    H pylori negative    GOUT    Gout    High blood pressure    High cholesterol    HLA B27 (HLA B27 positive)    HYPERLIPIDEMIA    HYPERTENSION    Neuropathy    shoulders arms and into the hands    PE (physical exam), annual    PMR (polymyalgia rheumatica) (HCC)    Pulmonary nodule    repeat CT chest  1/2016    Thrombocytopenia (HCC)    Type II or unspecified type diabetes mellitus without mention of complication, not stated as uncontrolled    Unspecified essential hypertension    Visual impairment    glasses      Past Surgical History:   Procedure Laterality Date    Anal sphincterotomy N/A 11/2/2015    Procedure: EXAM UNDER ANESTHESIA, RECTAL;  Surgeon: Eben Ann MD;  Location: [facility]    Back surgery  03/15/2018    L3-L5 Decompression, L4-L5 Discectomy uninstrumented fusion     Back surgery  05/24/2018    C5-C7 ACDF     Colonoscopy  3/10    polyps, due 2013    Colonoscopy  3/9/19= Polyp (TA), Bx: Normal colon    Repeat PRN    Colonoscopy N/A 3/9/2019    Procedure: COLONOSCOPY, POSSIBLE BIOPSY, POSSIBLE POLYPECTOMY 43594;  Surgeon: Camden Corado MD;  Location: [facility]    Colonoscopy & polypectomy  3/8/13= Polyps (TA)    Repeat 2018    Colonoscopy,remv lana,snare  3/5/2013    Procedure: ESOPHAGOGASTRODUODENOSCOPY, COLONOSCOPY, POSSIBLE BIOPSY, POSSIBLE POLYPECTOMY [id number];  Surgeon: Camden Corado MD;  Location: [facility]    Other surgical history      pilonidal cyst    Other surgical history      hammertoes and  bunionb/l     Other surgical history      C-spine fusion    Patient documented not to have experienced any of the following events  3/5/2013    Procedure: ESOPHAGOGASTRODUODENOSCOPY, COLONOSCOPY, POSSIBLE BIOPSY, POSSIBLE POLYPECTOMY [id number],25563;  Surgeon: Camden Corado MD;  Location: [facility]    Patient withough preoperative order for iv antibiotic surgical site infection prophylaxis.  3/5/2013    Procedure: ESOPHAGOGASTRODUODENOSCOPY, COLONOSCOPY, POSSIBLE BIOPSY, POSSIBLE POLYPECTOMY 62885,03913;  Surgeon: Camden Corado MD;  Location: [facility]    Tonsillectomy      Upper gi endoscopy performed  3/8/13= Gastritis    Normal gastric/duodenal Bx: Normal. No H pylori    Upper gi endoscopy,biopsy  3/5/2013    Procedure: ESOPHAGOGASTRODUODENOSCOPY, COLONOSCOPY, POSSIBLE BIOPSY, POSSIBLE POLYPECTOMY 89696,[id number];  Surgeon: Camden Corado MD;  Location: [facility]      Family History   Problem Relation Age of Onset    Other (Other) Mother         scleroderma    Diabetes Neg     Cancer Neg     Heart Disorder Neg     Hypertension Neg     Lipids Neg       Social History     Socioeconomic History    Marital status:    Tobacco Use    Smoking status: Former     Current packs/day: 0.50     Average packs/day: 0.5 packs/day for 50.0 years (25.0 ttl pk-yrs)     Types: Cigars, Cigarettes    Smokeless tobacco: Never   Vaping Use    Vaping status: Every Day   Substance and Sexual Activity    Alcohol use: Yes     Alcohol/week: 7.0 standard drinks of alcohol     Types: 7 Standard drinks or equivalent per week    Drug use: No           REVIEW OF SYSTEMS:   Today reviewed systens as documented below  GENERAL HEALTH: feels well otherwise  SKIN: Refer to exam below  RESPIRATORY: denies shortness of breath with exertion  CARDIOVASCULAR: denies chest pain on exertion  GI: denies abdominal pain and denies heartburn  NEURO: denies headaches    EXAM:   There were no vitals taken for this  visit.  GENERAL: well developed, well nourished, in no apparent distress  EXTREMITIES:   1. Integument: Skin on both feet was evaluated at school it is dry.  He has surgical scars from a fourth metatarsal resection and fourth toe resection on the right foot.  His nails are thickened and dystrophic 1-3 and 5 on the right and 1-5 on the left there is incurvation of the medial lateral nail borders of the hallux bilateral there is hyperkeratotic impaction discomfort on the medial nail border of the right hallux   2. Vascular: Patient has palpable dorsalis pedis pulse but weakened does have a known history of vascular disease   3. Neurologic: Has intact sensorium some numbness tingling   4. 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    ASSESSMENT AND PLAN:   Diagnoses and all orders for this visit:    Stage 3 chronic kidney disease, unspecified whether stage 3a or 3b CKD (HCC)    Onychocryptosis    Ingrowing toenail with infection    Pain of toe of right foot    Other specified peripheral vascular diseases (HCC)    Onychomycosis    OC (onychocryptosis)    Diabetic neuropathy with neurologic complication (HCC)    OM (onychomycosis)        Plan: At today's office visit trimmed down and debrided toenails were present as documented above using a slant back technique on the hallux.  At today's visit I reminded the patient about daily foot checks  Reminded patient again of proper control of his diabetes to help prevent any severe complications in his feet  Proper foot hygiene moisturizing except between the toes was reviewed  Advised follow-up again every 2 to 3 months for routine care but emphasized to him the importance of coming in sooner if he notices any problems.    The patient indicates understanding of these issues and agrees to the plan.    Anibal Crum DPM

## 2024-06-25 ENCOUNTER — TELEPHONE (OUTPATIENT)
Dept: PODIATRY CLINIC | Facility: CLINIC | Age: 79
End: 2024-06-25

## 2024-06-25 ENCOUNTER — OFFICE VISIT (OUTPATIENT)
Dept: PODIATRY CLINIC | Facility: CLINIC | Age: 79
End: 2024-06-25

## 2024-06-25 DIAGNOSIS — L03.032 SUBUNGUAL ABSCESS OF TOE OF LEFT FOOT: ICD-10-CM

## 2024-06-25 DIAGNOSIS — E11.40 DIABETIC NEUROPATHY WITH NEUROLOGIC COMPLICATION (HCC): ICD-10-CM

## 2024-06-25 DIAGNOSIS — I73.89 OTHER SPECIFIED PERIPHERAL VASCULAR DISEASES (HCC): Primary | ICD-10-CM

## 2024-06-25 DIAGNOSIS — B35.1 ONYCHOMYCOSIS: ICD-10-CM

## 2024-06-25 DIAGNOSIS — E11.49 DIABETIC NEUROPATHY WITH NEUROLOGIC COMPLICATION (HCC): ICD-10-CM

## 2024-06-25 PROCEDURE — 99213 OFFICE O/P EST LOW 20 MIN: CPT | Performed by: PODIATRIST

## 2024-06-25 RX ORDER — GLIPIZIDE 2.5 MG/1
2.5 TABLET, EXTENDED RELEASE ORAL DAILY
COMMUNITY
Start: 2024-01-09

## 2024-06-25 NOTE — TELEPHONE ENCOUNTER
Received fax from [employer] stating Mupirocin 2% ointment 15gm needs to be changed to Mupirocincre and patient will be having knee surgery 7/10. Please advise.

## 2024-06-25 NOTE — PROGRESS NOTES
You Titus is a 78 year old male.   Chief Complaint   Patient presents with    Diabetic Foot Care     F/u Diabetic Foot Care, on 6/17/24 A1C= 4.8         HPI:   Returns to clinic for routine diabetic footcare.  His diabetes is under good control.  No complaints.  At today's visit reviewed nurse's history as taken above, allergies medications and medical history as documented below.  All changes duly noted  Allergies: Tizanidine   Current Outpatient Medications   Medication Sig Dispense Refill    glipiZIDE ER 2.5 MG Oral Tablet 24 Hr Take 1 tablet (2.5 mg total) by mouth daily.      mupirocin 2 % External Ointment Apply a small amount to the affected nail edge twice daily after soaking and cover with a Band-Aid, 30 g 0    traMADol 50 MG Oral Tab Take 1 tablet (50 mg total) by mouth every 12 (twelve) hours as needed for Pain. 30 tablet 0    Multiple Vitamins-Minerals (PRESERVISION AREDS) Oral Tab Take by mouth daily.      AMLODIPINE 5 MG Oral Tab TAKE 1 TABLET(5 MG) BY MOUTH DAILY 90 tablet 1    predniSONE 2.5 MG Oral Tab Take 1 tablet (2.5 mg total) by mouth daily. 90 tablet 0    XARELTO 10 MG Oral Tab TAKE 1 TABLET(10 MG) BY MOUTH DAILY WITH FOOD 30 tablet 2    ALPRAZolam 0.25 MG Oral Tab Take 1 tablet (0.25 mg total) by mouth daily. 30 tablet 0    gabapentin 300 MG Oral Cap Take 1 capsule (300 mg total) by mouth in the morning and 1 capsule (300 mg total) before bedtime.      metFORMIN 500 MG Oral Tab Take 1 tablet (500 mg total) by mouth 2 (two) times daily. 180 tablet 3    ALLOPURINOL 100 MG Oral Tab TAKE 1 TABLET(100 MG) BY MOUTH DAILY 90 tablet 2    losartan 100 MG Oral Tab Take 1 tablet (100 mg total) by mouth daily. 90 tablet 1    rosuvastatin 5 MG Oral Tab Take 1 tablet (5 mg total) by mouth daily. 90 tablet 3    ACCU-CHEK FASTCLIX LANCETS Does not apply Misc Test once daily 100 each 4    ACCU-CHEK SMARTVIEW In Vitro Strip Test once daily 150 strip 4    Probiotic Product (ALIGN) 4 MG Oral Cap Take 4 mg by  mouth daily.        HYDROcodone-acetaminophen 5-325 MG Oral Tab Take 1 tablet by mouth every 6 (six) hours as needed for Pain. (Patient not taking: Reported on 6/25/2024) 10 tablet 0    mupirocin 2 % External Ointment Apply a small amount to the affected nail edge twice daily after soaking and cover with a Band-Aid, 30 g 1      Past Medical History:    BACK PAIN    Back problem    DIABETES    DVT (deep venous thrombosis) (HCC)    bilateral legs- different times though    Gastritis    H pylori negative    GOUT    Gout    High blood pressure    High cholesterol    HLA B27 (HLA B27 positive)    HYPERLIPIDEMIA    HYPERTENSION    Neuropathy    shoulders arms and into the hands    PE (physical exam), annual    PMR (polymyalgia rheumatica) (HCC)    Pulmonary nodule    repeat CT chest  1/2016    Thrombocytopenia (HCC)    Type II or unspecified type diabetes mellitus without mention of complication, not stated as uncontrolled    Unspecified essential hypertension    Visual impairment    glasses      Past Surgical History:   Procedure Laterality Date    Anal sphincterotomy N/A 11/2/2015    Procedure: EXAM UNDER ANESTHESIA, RECTAL;  Surgeon: Eben Ann MD;  Location: [facility]    Back surgery  03/15/2018    L3-L5 Decompression, L4-L5 Discectomy uninstrumented fusion     Back surgery  05/24/2018    C5-C7 ACDF     Colonoscopy  3/10    polyps, due 2013    Colonoscopy  3/9/19= Polyp (TA), Bx: Normal colon    Repeat PRN    Colonoscopy N/A 3/9/2019    Procedure: COLONOSCOPY, POSSIBLE BIOPSY, POSSIBLE POLYPECTOMY [id number];  Surgeon: Camden Corado MD;  Location: [facility]    Colonoscopy & polypectomy  3/8/13= Polyps (TA)    Repeat 2018    Colonoscopy,remv lana,snare  3/5/2013    Procedure: ESOPHAGOGASTRODUODENOSCOPY, COLONOSCOPY, POSSIBLE BIOPSY, POSSIBLE POLYPECTOMY 93827,19139;  Surgeon: Camden Corado MD;  Location: [facility]    Other surgical history      pilonidal cyst    Other  surgical history      hammertoes and bunionb/l     Other surgical history      C-spine fusion    Patient documented not to have experienced any of the following events  3/5/2013    Procedure: ESOPHAGOGASTRODUODENOSCOPY, COLONOSCOPY, POSSIBLE BIOPSY, POSSIBLE POLYPECTOMY 46239,06415;  Surgeon: Camden Corado MD;  Location: [facility]    Patient withough preoperative order for iv antibiotic surgical site infection prophylaxis.  3/5/2013    Procedure: ESOPHAGOGASTRODUODENOSCOPY, COLONOSCOPY, POSSIBLE BIOPSY, POSSIBLE POLYPECTOMY 76478,79589;  Surgeon: Camden Corado MD;  Location: [facility]    Tonsillectomy      Upper gi endoscopy performed  3/8/13= Gastritis    Normal gastric/duodenal Bx: Normal. No H pylori    Upper gi endoscopy,biopsy  3/5/2013    Procedure: ESOPHAGOGASTRODUODENOSCOPY, COLONOSCOPY, POSSIBLE BIOPSY, POSSIBLE POLYPECTOMY 48800,91984;  Surgeon: Camden Corado MD;  Location: [facility]      Family History   Problem Relation Age of Onset    Other (Other) Mother         scleroderma    Diabetes Neg     Cancer Neg     Heart Disorder Neg     Hypertension Neg     Lipids Neg       Social History     Socioeconomic History    Marital status:    Tobacco Use    Smoking status: Former     Current packs/day: 0.50     Average packs/day: 0.5 packs/day for 50.0 years (25.0 ttl pk-yrs)     Types: Cigars, Cigarettes    Smokeless tobacco: Never   Vaping Use    Vaping status: Every Day   Substance and Sexual Activity    Alcohol use: Yes     Alcohol/week: 7.0 standard drinks of alcohol     Types: 7 Standard drinks or equivalent per week    Drug use: No           REVIEW OF SYSTEMS:   Today reviewed systens as documented below  GENERAL HEALTH: feels well otherwise  SKIN: Refer to exam below  RESPIRATORY: denies shortness of breath with exertion  CARDIOVASCULAR: denies chest pain on exertion  GI: denies abdominal pain and denies heartburn  NEURO: denies headaches    EXAM:    There were no vitals taken for this visit.  GENERAL: well developed, well nourished, in no apparent distress  EXTREMITIES:   1. Integument: His toenails 1-5 on the left and 1-3 and 5 on the right are all thickened and dystrophic there is some incurvation of the medial lateral nail borders of the hallux and some of his lesser toenails which can become symptomatic all toenails that are present are thickened however the left hallux nail is loosened and upon debridement of it there is a subungual ulceration.  Patient the ulceration is clean there is no malodor no purulent drainage.  There is no sign of cellulitis.   2. Vascular: Patient has palpable pulses on the left has a nonpalpable dorsalis pedis on the right but palpable posterior tibial on the right.   3. Neurologic: The patient has slightly diminished sensorium   4. Musculoskeletal: Has good muscle strength and is ambulatory has had a previous partial fourth ray amputation for osteomyelitis in the right foot which is completely healed and resolved.    ASSESSMENT AND PLAN:   Diagnoses and all orders for this visit:    Other specified peripheral vascular diseases (HCC)    Onychomycosis    Diabetic neuropathy with neurologic complication (HCC)    Subungual abscess of toe of left foot    Other orders  -     mupirocin 2 % External Ointment; Apply a small amount to the affected nail edge twice daily after soaking and cover with a Band-Aid,        Plan: Today trimmed down debrided all toenails as documented above totaling 9 nails in girth and what this far down to healthy tissue as possible the moistened and loosened the left hallux nail was removed as much as I could has a granular base he will wash it thoroughly when he takes a shower bath apply mupirocin ointment and a Band-Aid this cautioned on signs of infection present to the emergency room or urgent care for treatment did not require oral antibiotics as it does not appear to be infected today.  He is planning  to have his knee done on July 10 I told him to come back in a week to make sure that this is healing or almost healed because his surgeons may not want to operate on his knee with an implant if he has any type of infection going on.  Within 1 week.  The patient indicates understanding of these issues and agrees to the plan.    Anibal Crum, ALESSANDRAM

## 2024-06-25 NOTE — TELEPHONE ENCOUNTER
Called pharmacy and they said there are no issues with the mupirocin ointment that was rx'd today and it was filled and ready for .

## 2024-07-01 ENCOUNTER — OFFICE VISIT (OUTPATIENT)
Dept: PODIATRY CLINIC | Facility: CLINIC | Age: 79
End: 2024-07-01
Payer: MEDICARE

## 2024-07-01 DIAGNOSIS — L03.032 SUBUNGUAL ABSCESS OF TOE OF LEFT FOOT: Primary | ICD-10-CM

## 2024-07-01 PROCEDURE — 99213 OFFICE O/P EST LOW 20 MIN: CPT | Performed by: PODIATRIST

## 2024-07-01 NOTE — PROGRESS NOTES
You Titus is a 78 year old male.   Chief Complaint   Patient presents with    Follow - Up     Left hallux- patient denies pain- patient states the wound has cleared up          HPI:   Patient to the clinic for checkup on his left hallux nail he had a subungual abscess was debrided a week or 2 ago.  At today's visit reviewed nurse's history as taken above, allergies medications and medical history as documented below.  All changes duly noted  Allergies: Tizanidine   Current Outpatient Medications   Medication Sig Dispense Refill    glipiZIDE ER 2.5 MG Oral Tablet 24 Hr Take 1 tablet (2.5 mg total) by mouth daily.      mupirocin 2 % External Ointment Apply a small amount to the affected nail edge twice daily after soaking and cover with a Band-Aid, 30 g 0    traMADol 50 MG Oral Tab Take 1 tablet (50 mg total) by mouth every 12 (twelve) hours as needed for Pain. 30 tablet 0    Multiple Vitamins-Minerals (PRESERVISION AREDS) Oral Tab Take by mouth daily.      AMLODIPINE 5 MG Oral Tab TAKE 1 TABLET(5 MG) BY MOUTH DAILY 90 tablet 1    predniSONE 2.5 MG Oral Tab Take 1 tablet (2.5 mg total) by mouth daily. 90 tablet 0    XARELTO 10 MG Oral Tab TAKE 1 TABLET(10 MG) BY MOUTH DAILY WITH FOOD 30 tablet 2    ALPRAZolam 0.25 MG Oral Tab Take 1 tablet (0.25 mg total) by mouth daily. 30 tablet 0    gabapentin 300 MG Oral Cap Take 1 capsule (300 mg total) by mouth in the morning and 1 capsule (300 mg total) before bedtime.      metFORMIN 500 MG Oral Tab Take 1 tablet (500 mg total) by mouth 2 (two) times daily. 180 tablet 3    ALLOPURINOL 100 MG Oral Tab TAKE 1 TABLET(100 MG) BY MOUTH DAILY 90 tablet 2    losartan 100 MG Oral Tab Take 1 tablet (100 mg total) by mouth daily. 90 tablet 1    rosuvastatin 5 MG Oral Tab Take 1 tablet (5 mg total) by mouth daily. 90 tablet 3    ACCU-CHEK FASTCLIX LANCETS Does not apply Misc Test once daily 100 each 4    ACCU-CHEK SMARTVIEW In Vitro Strip Test once daily 150 strip 4    Probiotic  Product (ALIGN) 4 MG Oral Cap Take 4 mg by mouth daily.        HYDROcodone-acetaminophen 5-325 MG Oral Tab Take 1 tablet by mouth every 6 (six) hours as needed for Pain. (Patient not taking: Reported on 6/25/2024) 10 tablet 0    mupirocin 2 % External Ointment Apply a small amount to the affected nail edge twice daily after soaking and cover with a Band-Aid, 30 g 1      Past Medical History:    BACK PAIN    Back problem    DIABETES    DVT (deep venous thrombosis) (HCC)    bilateral legs- different times though    Gastritis    H pylori negative    GOUT    Gout    High blood pressure    High cholesterol    HLA B27 (HLA B27 positive)    HYPERLIPIDEMIA    HYPERTENSION    Neuropathy    shoulders arms and into the hands    PE (physical exam), annual    PMR (polymyalgia rheumatica) (HCC)    Pulmonary nodule    repeat CT chest  1/2016    Thrombocytopenia (HCC)    Type II or unspecified type diabetes mellitus without mention of complication, not stated as uncontrolled    Unspecified essential hypertension    Visual impairment    glasses      Past Surgical History:   Procedure Laterality Date    Anal sphincterotomy N/A 11/2/2015    Procedure: EXAM UNDER ANESTHESIA, RECTAL;  Surgeon: Eben Ann MD;  Location: [facility]    Back surgery  03/15/2018    L3-L5 Decompression, L4-L5 Discectomy uninstrumented fusion     Back surgery  05/24/2018    C5-C7 ACDF     Colonoscopy  3/10    polyps, due 2013    Colonoscopy  3/9/19= Polyp (TA), Bx: Normal colon    Repeat PRN    Colonoscopy N/A 3/9/2019    Procedure: COLONOSCOPY, POSSIBLE BIOPSY, POSSIBLE POLYPECTOMY 44588;  Surgeon: Camden Corado MD;  Location: [facility]    Colonoscopy & polypectomy  3/8/13= Polyps (TA)    Repeat 2018    Colonoscopy,remv lesobey,snare  3/5/2013    Procedure: ESOPHAGOGASTRODUODENOSCOPY, COLONOSCOPY, POSSIBLE BIOPSY, POSSIBLE POLYPECTOMY 44146,65260;  Surgeon: Camden Corado MD;  Location: [facility]    Other  surgical history      pilonidal cyst    Other surgical history      hammertoes and bunionb/l     Other surgical history      C-spine fusion    Patient documented not to have experienced any of the following events  3/5/2013    Procedure: ESOPHAGOGASTRODUODENOSCOPY, COLONOSCOPY, POSSIBLE BIOPSY, POSSIBLE POLYPECTOMY 59016,75134;  Surgeon: Camden Corado MD;  Location: [facility]    Patient withough preoperative order for iv antibiotic surgical site infection prophylaxis.  3/5/2013    Procedure: ESOPHAGOGASTRODUODENOSCOPY, COLONOSCOPY, POSSIBLE BIOPSY, POSSIBLE POLYPECTOMY 87219,60947;  Surgeon: Camden Corado MD;  Location: [facility]    Tonsillectomy      Upper gi endoscopy performed  3/8/13= Gastritis    Normal gastric/duodenal Bx: Normal. No H pylori    Upper gi endoscopy,biopsy  3/5/2013    Procedure: ESOPHAGOGASTRODUODENOSCOPY, COLONOSCOPY, POSSIBLE BIOPSY, POSSIBLE POLYPECTOMY 85324,46742;  Surgeon: Camden Corado MD;  Location: [facility]      Family History   Problem Relation Age of Onset    Other (Other) Mother         scleroderma    Diabetes Neg     Cancer Neg     Heart Disorder Neg     Hypertension Neg     Lipids Neg       Social History     Socioeconomic History    Marital status:    Tobacco Use    Smoking status: Former     Current packs/day: 0.50     Average packs/day: 0.5 packs/day for 50.0 years (25.0 ttl pk-yrs)     Types: Cigars, Cigarettes    Smokeless tobacco: Never   Vaping Use    Vaping status: Every Day   Substance and Sexual Activity    Alcohol use: Yes     Alcohol/week: 7.0 standard drinks of alcohol     Types: 7 Standard drinks or equivalent per week    Drug use: No           REVIEW OF SYSTEMS:   Today reviewed systens as documented below  GENERAL HEALTH: feels well otherwise  SKIN: Refer to exam below  RESPIRATORY: denies shortness of breath with exertion  CARDIOVASCULAR: denies chest pain on exertion  GI: denies abdominal pain and denies  heartburn  NEURO: denies headaches    EXAM:   There were no vitals taken for this visit.  GENERAL: well developed, well nourished, in no apparent distress  EXTREMITIES:   1. Integument: Skin on his left foot was evaluated is warm and dry there is subungual abscess area of his left hallux is completely healed there is no further drainage no further sign of infection.   2. Vascular: Patient has palpable pulses   3. Neurologic: Patient has \initial pedal sensorium on his left foot   4. Musculoskeletal: Patient has good muscle strength.  He is getting ready to go for a partial knee replacement on the left side.    ASSESSMENT AND PLAN:   Diagnoses and all orders for this visit:    Subungual abscess of toe of left foot        Plan:'s office visit he can discontinue all local care I see no sign of infection in his left great toe will follow-up in 61 days.    The patient indicates understanding of these issues and agrees to the plan.    Anibal Crum DPM

## 2024-08-05 ENCOUNTER — APPOINTMENT (OUTPATIENT)
Dept: GENERAL RADIOLOGY | Age: 79
End: 2024-08-05
Attending: STUDENT IN AN ORGANIZED HEALTH CARE EDUCATION/TRAINING PROGRAM

## 2024-08-05 ENCOUNTER — HOSPITAL ENCOUNTER (OUTPATIENT)
Age: 79
Setting detail: OBSERVATION
Discharge: HOME | End: 2024-08-06
Attending: STUDENT IN AN ORGANIZED HEALTH CARE EDUCATION/TRAINING PROGRAM | Admitting: INTERNAL MEDICINE

## 2024-08-05 DIAGNOSIS — R22.1 NECK MASS: ICD-10-CM

## 2024-08-05 DIAGNOSIS — M25.562 ACUTE PAIN OF LEFT KNEE: Primary | ICD-10-CM

## 2024-08-05 LAB
ALBUMIN SERPL-MCNC: 3.9 G/DL (ref 3.6–5.1)
ALBUMIN/GLOB SERPL: 1.1 {RATIO} (ref 1–2.4)
ALP SERPL-CCNC: 143 UNITS/L (ref 45–117)
ALT SERPL-CCNC: 19 UNITS/L
ANION GAP SERPL CALC-SCNC: 7 MMOL/L (ref 7–19)
AST SERPL-CCNC: 13 UNITS/L
BASOPHILS # BLD: 0 K/MCL (ref 0–0.3)
BASOPHILS NFR BLD: 0 %
BILIRUB SERPL-MCNC: 1.6 MG/DL (ref 0.2–1)
BUN SERPL-MCNC: 16 MG/DL (ref 6–20)
BUN/CREAT SERPL: 15 (ref 7–25)
CALCIUM SERPL-MCNC: 9.3 MG/DL (ref 8.4–10.2)
CHLORIDE SERPL-SCNC: 109 MMOL/L (ref 97–110)
CO2 SERPL-SCNC: 26 MMOL/L (ref 21–32)
CREAT SERPL-MCNC: 1.1 MG/DL (ref 0.67–1.17)
CRP SERPL-MCNC: <3 MG/L
DEPRECATED RDW RBC: 49.2 FL (ref 39–50)
EGFRCR SERPLBLD CKD-EPI 2021: 69 ML/MIN/{1.73_M2}
EOSINOPHIL # BLD: 0 K/MCL (ref 0–0.5)
EOSINOPHIL NFR BLD: 0 %
ERYTHROCYTE [DISTWIDTH] IN BLOOD: 14 % (ref 11–15)
ERYTHROCYTE [SEDIMENTATION RATE] IN BLOOD BY WESTERGREN METHOD: 4 MM/HR (ref 0–20)
FASTING DURATION TIME PATIENT: ABNORMAL H
GLOBULIN SER-MCNC: 3.4 G/DL (ref 2–4)
GLUCOSE BLDC GLUCOMTR-MCNC: 136 MG/DL (ref 70–99)
GLUCOSE SERPL-MCNC: 133 MG/DL (ref 70–99)
HCT VFR BLD CALC: 40.1 % (ref 39–51)
HGB BLD-MCNC: 14 G/DL (ref 13–17)
IMM GRANULOCYTES # BLD AUTO: 0.1 K/MCL (ref 0–0.2)
IMM GRANULOCYTES # BLD: 1 %
LYMPHOCYTES # BLD: 1.4 K/MCL (ref 1–4)
LYMPHOCYTES NFR BLD: 13 %
MCH RBC QN AUTO: 33.3 PG (ref 26–34)
MCHC RBC AUTO-ENTMCNC: 34.9 G/DL (ref 32–36.5)
MCV RBC AUTO: 95.2 FL (ref 78–100)
MONOCYTES # BLD: 0.8 K/MCL (ref 0.3–0.9)
MONOCYTES NFR BLD: 8 %
NEUTROPHILS # BLD: 8.3 K/MCL (ref 1.8–7.7)
NEUTROPHILS NFR BLD: 78 %
NRBC BLD MANUAL-RTO: 0 /100 WBC
PLATELET # BLD AUTO: 152 K/MCL (ref 140–450)
POTASSIUM SERPL-SCNC: 4 MMOL/L (ref 3.4–5.1)
PROT SERPL-MCNC: 7.3 G/DL (ref 6.4–8.2)
RBC # BLD: 4.21 MIL/MCL (ref 4.5–5.9)
SODIUM SERPL-SCNC: 138 MMOL/L (ref 135–145)
WBC # BLD: 10.6 K/MCL (ref 4.2–11)

## 2024-08-05 PROCEDURE — 85610 PROTHROMBIN TIME: CPT | Performed by: STUDENT IN AN ORGANIZED HEALTH CARE EDUCATION/TRAINING PROGRAM

## 2024-08-05 PROCEDURE — 10002800 HB RX 250 W HCPCS: Performed by: STUDENT IN AN ORGANIZED HEALTH CARE EDUCATION/TRAINING PROGRAM

## 2024-08-05 PROCEDURE — 80053 COMPREHEN METABOLIC PANEL: CPT | Performed by: STUDENT IN AN ORGANIZED HEALTH CARE EDUCATION/TRAINING PROGRAM

## 2024-08-05 PROCEDURE — 82962 GLUCOSE BLOOD TEST: CPT

## 2024-08-05 PROCEDURE — 73562 X-RAY EXAM OF KNEE 3: CPT

## 2024-08-05 PROCEDURE — 85652 RBC SED RATE AUTOMATED: CPT | Performed by: STUDENT IN AN ORGANIZED HEALTH CARE EDUCATION/TRAINING PROGRAM

## 2024-08-05 PROCEDURE — 86140 C-REACTIVE PROTEIN: CPT | Performed by: STUDENT IN AN ORGANIZED HEALTH CARE EDUCATION/TRAINING PROGRAM

## 2024-08-05 PROCEDURE — 10004651 HB RX, NO CHARGE ITEM: Performed by: STUDENT IN AN ORGANIZED HEALTH CARE EDUCATION/TRAINING PROGRAM

## 2024-08-05 PROCEDURE — 85025 COMPLETE CBC W/AUTO DIFF WBC: CPT | Performed by: STUDENT IN AN ORGANIZED HEALTH CARE EDUCATION/TRAINING PROGRAM

## 2024-08-05 RX ORDER — ACETAMINOPHEN 500 MG
1000 TABLET ORAL ONCE
Status: COMPLETED | OUTPATIENT
Start: 2024-08-05 | End: 2024-08-05

## 2024-08-05 RX ADMIN — ACETAMINOPHEN 1000 MG: 500 TABLET ORAL at 22:32

## 2024-08-05 RX ADMIN — MORPHINE SULFATE 4 MG: 4 INJECTION INTRAVENOUS at 21:55

## 2024-08-05 RX ADMIN — MORPHINE SULFATE 4 MG: 4 INJECTION INTRAVENOUS at 22:32

## 2024-08-05 ASSESSMENT — PAIN SCALES - GENERAL
PAINLEVEL_OUTOF10: 9
PAINLEVEL_OUTOF10: 10
PAINLEVEL_OUTOF10: 5

## 2024-08-06 ENCOUNTER — APPOINTMENT (OUTPATIENT)
Dept: CT IMAGING | Age: 79
End: 2024-08-06
Attending: STUDENT IN AN ORGANIZED HEALTH CARE EDUCATION/TRAINING PROGRAM

## 2024-08-06 VITALS
SYSTOLIC BLOOD PRESSURE: 151 MMHG | HEIGHT: 70 IN | RESPIRATION RATE: 16 BRPM | WEIGHT: 183.2 LBS | HEART RATE: 80 BPM | BODY MASS INDEX: 26.23 KG/M2 | TEMPERATURE: 98.4 F | OXYGEN SATURATION: 98 % | DIASTOLIC BLOOD PRESSURE: 80 MMHG

## 2024-08-06 PROBLEM — M25.562 KNEE PAIN, LEFT: Status: ACTIVE | Noted: 2024-08-06

## 2024-08-06 LAB
ALBUMIN SERPL-MCNC: 3.3 G/DL (ref 3.6–5.1)
ALBUMIN/GLOB SERPL: 1.1 {RATIO} (ref 1–2.4)
ALP SERPL-CCNC: 123 UNITS/L (ref 45–117)
ALT SERPL-CCNC: 13 UNITS/L
ANION GAP SERPL CALC-SCNC: 9 MMOL/L (ref 7–19)
AST SERPL-CCNC: 8 UNITS/L
BASOPHILS # BLD: 0 K/MCL (ref 0–0.3)
BASOPHILS NFR BLD: 0 %
BILIRUB SERPL-MCNC: 1.4 MG/DL (ref 0.2–1)
BUN SERPL-MCNC: 14 MG/DL (ref 6–20)
BUN/CREAT SERPL: 14 (ref 7–25)
CALCIUM SERPL-MCNC: 8.9 MG/DL (ref 8.4–10.2)
CHLORIDE SERPL-SCNC: 108 MMOL/L (ref 97–110)
CO2 SERPL-SCNC: 25 MMOL/L (ref 21–32)
CREAT SERPL-MCNC: 1.02 MG/DL (ref 0.67–1.17)
DEPRECATED RDW RBC: 48.6 FL (ref 39–50)
EGFRCR SERPLBLD CKD-EPI 2021: 75 ML/MIN/{1.73_M2}
EOSINOPHIL # BLD: 0 K/MCL (ref 0–0.5)
EOSINOPHIL NFR BLD: 0 %
ERYTHROCYTE [DISTWIDTH] IN BLOOD: 14 % (ref 11–15)
FASTING DURATION TIME PATIENT: ABNORMAL H
GLOBULIN SER-MCNC: 3 G/DL (ref 2–4)
GLUCOSE BLDC GLUCOMTR-MCNC: 130 MG/DL (ref 70–99)
GLUCOSE BLDC GLUCOMTR-MCNC: 134 MG/DL (ref 70–99)
GLUCOSE SERPL-MCNC: 111 MG/DL (ref 70–99)
HCT VFR BLD CALC: 36.2 % (ref 39–51)
HGB BLD-MCNC: 12.7 G/DL (ref 13–17)
IMM GRANULOCYTES # BLD AUTO: 0.1 K/MCL (ref 0–0.2)
IMM GRANULOCYTES # BLD: 1 %
INR PPP: 1.1
LYMPHOCYTES # BLD: 1.1 K/MCL (ref 1–4)
LYMPHOCYTES NFR BLD: 14 %
MAGNESIUM SERPL-MCNC: 1.6 MG/DL (ref 1.7–2.4)
MCH RBC QN AUTO: 33.3 PG (ref 26–34)
MCHC RBC AUTO-ENTMCNC: 35.1 G/DL (ref 32–36.5)
MCV RBC AUTO: 95 FL (ref 78–100)
MONOCYTES # BLD: 0.8 K/MCL (ref 0.3–0.9)
MONOCYTES NFR BLD: 10 %
NEUTROPHILS # BLD: 5.9 K/MCL (ref 1.8–7.7)
NEUTROPHILS NFR BLD: 75 %
NRBC BLD MANUAL-RTO: 0 /100 WBC
PLATELET # BLD AUTO: 123 K/MCL (ref 140–450)
POTASSIUM SERPL-SCNC: 3.9 MMOL/L (ref 3.4–5.1)
PROT SERPL-MCNC: 6.3 G/DL (ref 6.4–8.2)
PROTHROMBIN TIME: 11.9 SEC (ref 9.7–11.8)
RAINBOW EXTRA TUBES HOLD SPECIMEN: NORMAL
RBC # BLD: 3.81 MIL/MCL (ref 4.5–5.9)
SODIUM SERPL-SCNC: 138 MMOL/L (ref 135–145)
WBC # BLD: 7.9 K/MCL (ref 4.2–11)

## 2024-08-06 PROCEDURE — 36415 COLL VENOUS BLD VENIPUNCTURE: CPT | Performed by: INTERNAL MEDICINE

## 2024-08-06 PROCEDURE — 10002807 HB RX 258: Performed by: INTERNAL MEDICINE

## 2024-08-06 PROCEDURE — 10004651 HB RX, NO CHARGE ITEM: Performed by: INTERNAL MEDICINE

## 2024-08-06 PROCEDURE — 10002805 HB CONTRAST AGENT: Performed by: STUDENT IN AN ORGANIZED HEALTH CARE EDUCATION/TRAINING PROGRAM

## 2024-08-06 PROCEDURE — 82962 GLUCOSE BLOOD TEST: CPT

## 2024-08-06 PROCEDURE — 97530 THERAPEUTIC ACTIVITIES: CPT

## 2024-08-06 PROCEDURE — G0378 HOSPITAL OBSERVATION PER HR: HCPCS

## 2024-08-06 PROCEDURE — 85025 COMPLETE CBC W/AUTO DIFF WBC: CPT | Performed by: INTERNAL MEDICINE

## 2024-08-06 PROCEDURE — 10002803 HB RX 637: Performed by: HOSPITALIST

## 2024-08-06 PROCEDURE — 70491 CT SOFT TISSUE NECK W/DYE: CPT

## 2024-08-06 PROCEDURE — 10002800 HB RX 250 W HCPCS: Performed by: STUDENT IN AN ORGANIZED HEALTH CARE EDUCATION/TRAINING PROGRAM

## 2024-08-06 PROCEDURE — 80053 COMPREHEN METABOLIC PANEL: CPT | Performed by: INTERNAL MEDICINE

## 2024-08-06 PROCEDURE — 10002800 HB RX 250 W HCPCS: Performed by: INTERNAL MEDICINE

## 2024-08-06 PROCEDURE — 97162 PT EVAL MOD COMPLEX 30 MIN: CPT

## 2024-08-06 PROCEDURE — 10002803 HB RX 637: Performed by: INTERNAL MEDICINE

## 2024-08-06 PROCEDURE — 73700 CT LOWER EXTREMITY W/O DYE: CPT

## 2024-08-06 PROCEDURE — 83735 ASSAY OF MAGNESIUM: CPT | Performed by: INTERNAL MEDICINE

## 2024-08-06 RX ORDER — SODIUM CHLORIDE, SODIUM LACTATE, POTASSIUM CHLORIDE, CALCIUM CHLORIDE 600; 310; 30; 20 MG/100ML; MG/100ML; MG/100ML; MG/100ML
INJECTION, SOLUTION INTRAVENOUS CONTINUOUS
Status: DISCONTINUED | OUTPATIENT
Start: 2024-08-06 | End: 2024-08-06

## 2024-08-06 RX ORDER — TRAMADOL HYDROCHLORIDE 50 MG/1
50 TABLET ORAL EVERY 6 HOURS PRN
COMMUNITY

## 2024-08-06 RX ORDER — TRAMADOL HYDROCHLORIDE 50 MG/1
50 TABLET ORAL EVERY 6 HOURS PRN
Status: DISCONTINUED | OUTPATIENT
Start: 2024-08-06 | End: 2024-08-06 | Stop reason: HOSPADM

## 2024-08-06 RX ORDER — ALLOPURINOL 100 MG/1
100 TABLET ORAL DAILY
COMMUNITY

## 2024-08-06 RX ORDER — LOSARTAN POTASSIUM 50 MG/1
100 TABLET ORAL DAILY
Status: DISCONTINUED | OUTPATIENT
Start: 2024-08-06 | End: 2024-08-06 | Stop reason: HOSPADM

## 2024-08-06 RX ORDER — ONDANSETRON 2 MG/ML
4 INJECTION INTRAMUSCULAR; INTRAVENOUS EVERY 12 HOURS PRN
Status: DISCONTINUED | OUTPATIENT
Start: 2024-08-06 | End: 2024-08-06 | Stop reason: HOSPADM

## 2024-08-06 RX ORDER — ACETAMINOPHEN 325 MG/1
650 TABLET ORAL EVERY 4 HOURS PRN
Status: DISCONTINUED | OUTPATIENT
Start: 2024-08-06 | End: 2024-08-06 | Stop reason: HOSPADM

## 2024-08-06 RX ORDER — DEXTROSE MONOHYDRATE 25 G/50ML
25 INJECTION, SOLUTION INTRAVENOUS PRN
Status: DISCONTINUED | OUTPATIENT
Start: 2024-08-06 | End: 2024-08-06 | Stop reason: HOSPADM

## 2024-08-06 RX ORDER — AMLODIPINE BESYLATE 5 MG/1
5 TABLET ORAL DAILY
Status: DISCONTINUED | OUTPATIENT
Start: 2024-08-06 | End: 2024-08-06 | Stop reason: HOSPADM

## 2024-08-06 RX ORDER — ALPRAZOLAM 0.25 MG/1
0.25 TABLET ORAL NIGHTLY PRN
COMMUNITY

## 2024-08-06 RX ORDER — NICOTINE POLACRILEX 4 MG
15 LOZENGE BUCCAL PRN
Status: DISCONTINUED | OUTPATIENT
Start: 2024-08-06 | End: 2024-08-06 | Stop reason: HOSPADM

## 2024-08-06 RX ORDER — NICOTINE POLACRILEX 4 MG
30 LOZENGE BUCCAL PRN
Status: DISCONTINUED | OUTPATIENT
Start: 2024-08-06 | End: 2024-08-06 | Stop reason: HOSPADM

## 2024-08-06 RX ORDER — PREDNISONE 1 MG/1
3 TABLET ORAL DAILY
COMMUNITY

## 2024-08-06 RX ORDER — ACETAMINOPHEN 650 MG/1
650 SUPPOSITORY RECTAL EVERY 4 HOURS PRN
Status: DISCONTINUED | OUTPATIENT
Start: 2024-08-06 | End: 2024-08-06 | Stop reason: HOSPADM

## 2024-08-06 RX ORDER — ONDANSETRON 4 MG/1
4 TABLET, ORALLY DISINTEGRATING ORAL EVERY 12 HOURS PRN
Status: DISCONTINUED | OUTPATIENT
Start: 2024-08-06 | End: 2024-08-06 | Stop reason: HOSPADM

## 2024-08-06 RX ORDER — GABAPENTIN 300 MG/1
300 CAPSULE ORAL NIGHTLY
Status: DISCONTINUED | OUTPATIENT
Start: 2024-08-06 | End: 2024-08-06 | Stop reason: HOSPADM

## 2024-08-06 RX ORDER — ONDANSETRON HYDROCHLORIDE 8 MG/1
8 TABLET, FILM COATED ORAL EVERY 8 HOURS PRN
COMMUNITY

## 2024-08-06 RX ORDER — DEXTROSE MONOHYDRATE 25 G/50ML
12.5 INJECTION, SOLUTION INTRAVENOUS PRN
Status: DISCONTINUED | OUTPATIENT
Start: 2024-08-06 | End: 2024-08-06 | Stop reason: HOSPADM

## 2024-08-06 RX ORDER — 0.9 % SODIUM CHLORIDE 0.9 %
2 VIAL (ML) INJECTION EVERY 12 HOURS SCHEDULED
Status: DISCONTINUED | OUTPATIENT
Start: 2024-08-06 | End: 2024-08-06 | Stop reason: HOSPADM

## 2024-08-06 RX ORDER — ROSUVASTATIN CALCIUM 5 MG/1
5 TABLET, COATED ORAL DAILY
COMMUNITY

## 2024-08-06 RX ADMIN — SODIUM CHLORIDE, PRESERVATIVE FREE 2 ML: 5 INJECTION INTRAVENOUS at 02:46

## 2024-08-06 RX ADMIN — SODIUM CHLORIDE, PRESERVATIVE FREE 2 ML: 5 INJECTION INTRAVENOUS at 09:56

## 2024-08-06 RX ADMIN — ONDANSETRON 4 MG: 2 INJECTION INTRAMUSCULAR; INTRAVENOUS at 05:41

## 2024-08-06 RX ADMIN — SODIUM CHLORIDE, POTASSIUM CHLORIDE, SODIUM LACTATE AND CALCIUM CHLORIDE: 600; 310; 30; 20 INJECTION, SOLUTION INTRAVENOUS at 02:46

## 2024-08-06 RX ADMIN — LOSARTAN POTASSIUM 100 MG: 50 TABLET, FILM COATED ORAL at 17:44

## 2024-08-06 RX ADMIN — AMLODIPINE BESYLATE 5 MG: 5 TABLET ORAL at 09:56

## 2024-08-06 RX ADMIN — ACETAMINOPHEN 650 MG: 325 TABLET ORAL at 02:53

## 2024-08-06 RX ADMIN — IOHEXOL 75 ML: 350 INJECTION, SOLUTION INTRAVENOUS at 01:13

## 2024-08-06 RX ADMIN — MORPHINE SULFATE 4 MG: 4 INJECTION INTRAVENOUS at 01:30

## 2024-08-06 RX ADMIN — RIVAROXABAN 10 MG: 20 TABLET, FILM COATED ORAL at 17:44

## 2024-08-06 SDOH — ECONOMIC STABILITY: FOOD INSECURITY: WITHIN THE PAST 12 MONTHS, THE FOOD YOU BOUGHT JUST DIDN'T LAST AND YOU DIDN'T HAVE MONEY TO GET MORE.: NEVER TRUE

## 2024-08-06 SDOH — ECONOMIC STABILITY: GENERAL: WOULD YOU LIKE HELP WITH ANY OF THE FOLLOWING NEEDS?: I DON'T WANT HELP WITH ANY OF THESE

## 2024-08-06 SDOH — ECONOMIC STABILITY: HOUSING INSECURITY: WHAT IS YOUR LIVING SITUATION TODAY?: HOUSE

## 2024-08-06 SDOH — SOCIAL STABILITY: SOCIAL INSECURITY: HOW OFTEN DOES ANYONE, INCLUDING FAMILY AND FRIENDS, PHYSICALLY HURT YOU?: NEVER

## 2024-08-06 SDOH — HEALTH STABILITY: PHYSICAL HEALTH: DO YOU HAVE DIFFICULTY DRESSING OR BATHING?: NO

## 2024-08-06 SDOH — ECONOMIC STABILITY: GENERAL

## 2024-08-06 SDOH — SOCIAL STABILITY: SOCIAL INSECURITY: HOW OFTEN DOES ANYONE, INCLUDING FAMILY AND FRIENDS, INSULT OR TALK DOWN TO YOU?: NEVER

## 2024-08-06 SDOH — ECONOMIC STABILITY: HOUSING INSECURITY: WHAT IS YOUR LIVING SITUATION TODAY?: I HAVE A STEADY PLACE TO LIVE

## 2024-08-06 SDOH — HEALTH STABILITY: GENERAL
BECAUSE OF A PHYSICAL, MENTAL, OR EMOTIONAL CONDITION, DO YOU HAVE SERIOUS DIFFICULTY CONCENTRATING, REMEMBERING OR MAKING DECISIONS?: NO

## 2024-08-06 SDOH — HEALTH STABILITY: PHYSICAL HEALTH: DO YOU HAVE SERIOUS DIFFICULTY WALKING OR CLIMBING STAIRS?: NO

## 2024-08-06 SDOH — ECONOMIC STABILITY: INCOME INSECURITY: IN THE PAST 12 MONTHS, HAS THE ELECTRIC, GAS, OIL, OR WATER COMPANY THREATENED TO SHUT OFF SERVICE IN YOUR HOME?: NO

## 2024-08-06 SDOH — SOCIAL STABILITY: SOCIAL INSECURITY: HOW OFTEN DOES ANYONE, INCLUDING FAMILY AND FRIENDS, SCREAM OR CURSE AT YOU?: NEVER

## 2024-08-06 SDOH — SOCIAL STABILITY: SOCIAL NETWORK
HOW OFTEN DO YOU SEE OR TALK TO PEOPLE THAT YOU CARE ABOUT AND FEEL CLOSE TO? (FOR EXAMPLE: TALKING TO FRIENDS ON THE PHONE, VISITING FRIENDS OR FAMILY, GOING TO CHURCH OR CLUB MEETINGS): 5 OR MORE TIMES A WEEK

## 2024-08-06 SDOH — SOCIAL STABILITY: SOCIAL NETWORK: SUPPORT SYSTEMS: FAMILY MEMBERS;CHILDREN

## 2024-08-06 SDOH — ECONOMIC STABILITY: HOUSING INSECURITY: DO YOU HAVE PROBLEMS WITH ANY OF THE FOLLOWING?: NONE OF THE ABOVE

## 2024-08-06 SDOH — ECONOMIC STABILITY: TRANSPORTATION INSECURITY
IN THE PAST 12 MONTHS, HAS LACK OF RELIABLE TRANSPORTATION KEPT YOU FROM MEDICAL APPOINTMENTS, MEETINGS, WORK OR FROM GETTING THINGS NEEDED FOR DAILY LIVING?: NO

## 2024-08-06 SDOH — HEALTH STABILITY: GENERAL: BECAUSE OF A PHYSICAL, MENTAL, OR EMOTIONAL CONDITION, DO YOU HAVE DIFFICULTY DOING ERRANDS ALONE?: NO

## 2024-08-06 SDOH — ECONOMIC STABILITY: HOUSING INSECURITY: WHAT IS YOUR LIVING SITUATION TODAY?: SPOUSE

## 2024-08-06 SDOH — SOCIAL STABILITY: SOCIAL INSECURITY: HOW OFTEN DOES ANYONE, INCLUDING FAMILY AND FRIENDS, THREATEN YOU WITH HARM?: NEVER

## 2024-08-06 ASSESSMENT — ENCOUNTER SYMPTOMS
NAUSEA: 0
SHORTNESS OF BREATH: 0
FEVER: 0
WOUND: 0
CHILLS: 0
BACK PAIN: 0
COUGH: 0
DIZZINESS: 0
WEAKNESS: 0
ABDOMINAL PAIN: 0
VOMITING: 0
AGITATION: 0
SORE THROAT: 0

## 2024-08-06 ASSESSMENT — ORIENTATION MEMORY CONCENTRATION TEST (OMCT)
OMCT INTERPRETATION: 0-6: NO SIGNIFICANT IMPAIRMENT
COUNT BACKWARDS FROM 20 TO 1: CORRECT
SAY THE MONTHS IN REVERSE ORDER STARTING WITH LAST MONTH: CORRECT
WHAT YEAR IS IT NOW (MUST BE EXACT): CORRECT
WHAT TIME IS IT (NO WATCH OR CLOCK): CORRECT
OMCT SCORE: 0
REPEAT THE NAME AND ADDRESS I ASKED YOU TO REMEMBER: CORRECT
WHAT MONTH IS IT NOW: CORRECT

## 2024-08-06 ASSESSMENT — ACTIVITIES OF DAILY LIVING (ADL)
RECENT_DECLINE_ADL: NO
ADL_SCORE: 12
ADL_SHORT_OF_BREATH: NO
ADL_BEFORE_ADMISSION: INDEPENDENT
PRIOR_ADL: INDEPENDENT

## 2024-08-06 ASSESSMENT — COGNITIVE AND FUNCTIONAL STATUS - GENERAL
BASIC_MOBILITY_RAW_SCORE: 21
BASIC_MOBILITY_CONVERTED_SCORE: 45.55

## 2024-08-06 ASSESSMENT — LIFESTYLE VARIABLES
HOW OFTEN DO YOU HAVE A DRINK CONTAINING ALCOHOL: 4 OR MORE TIMES PER WEEK
ALCOHOL_USE_STATUS: NO OR LOW RISK WITH VALIDATED TOOL
HOW MANY STANDARD DRINKS CONTAINING ALCOHOL DO YOU HAVE ON A TYPICAL DAY: 0,1 OR 2
AUDIT-C TOTAL SCORE: 4
HOW OFTEN DO YOU HAVE 6 OR MORE DRINKS ON ONE OCCASION: NEVER

## 2024-08-06 ASSESSMENT — PATIENT HEALTH QUESTIONNAIRE - PHQ9
1. LITTLE INTEREST OR PLEASURE IN DOING THINGS: NOT AT ALL
SUM OF ALL RESPONSES TO PHQ9 QUESTIONS 1 AND 2: 0
CLINICAL INTERPRETATION OF PHQ2 SCORE: NO FURTHER SCREENING NEEDED
IS PATIENT ABLE TO COMPLETE PHQ2 OR PHQ9: YES
2. FEELING DOWN, DEPRESSED OR HOPELESS: NOT AT ALL
SUM OF ALL RESPONSES TO PHQ9 QUESTIONS 1 AND 2: 0

## 2024-08-06 ASSESSMENT — COLUMBIA-SUICIDE SEVERITY RATING SCALE - C-SSRS
1. WITHIN THE PAST MONTH, HAVE YOU WISHED YOU WERE DEAD OR WISHED YOU COULD GO TO SLEEP AND NOT WAKE UP?: NO
IS THE PATIENT ABLE TO COMPLETE C-SSRS: YES
2. HAVE YOU ACTUALLY HAD ANY THOUGHTS OF KILLING YOURSELF?: NO
6. HAVE YOU EVER DONE ANYTHING, STARTED TO DO ANYTHING, OR PREPARED TO DO ANYTHING TO END YOUR LIFE?: NO

## 2024-08-06 ASSESSMENT — PAIN SCALES - GENERAL
PAINLEVEL_OUTOF10: 4
PAINLEVEL_OUTOF10: 5
PAINLEVEL_OUTOF10: 2

## 2024-08-08 ENCOUNTER — TELEPHONE (OUTPATIENT)
Dept: CARE COORDINATION | Age: 79
End: 2024-08-08

## 2024-08-15 ENCOUNTER — TELEPHONE (OUTPATIENT)
Dept: CARE COORDINATION | Age: 79
End: 2024-08-15

## 2024-08-22 ENCOUNTER — LAB REQUISITION (OUTPATIENT)
Age: 79
End: 2024-08-22
Payer: MEDICARE

## 2024-08-22 ENCOUNTER — TELEPHONE (OUTPATIENT)
Dept: CARE COORDINATION | Age: 79
End: 2024-08-22

## 2024-08-22 DIAGNOSIS — R59.0 CERVICAL LYMPHADENOPATHY: ICD-10-CM

## 2024-08-22 PROCEDURE — 88365 INSITU HYBRIDIZATION (FISH): CPT | Performed by: PATHOLOGY

## 2024-08-23 ENCOUNTER — TELEPHONE (OUTPATIENT)
Dept: CARE COORDINATION | Age: 79
End: 2024-08-23

## 2024-08-27 ENCOUNTER — LAB REQUISITION (OUTPATIENT)
Age: 79
End: 2024-08-27
Payer: MEDICARE

## 2024-08-27 DIAGNOSIS — D48.9 NEOPLASM OF UNCERTAIN BEHAVIOR: ICD-10-CM

## 2024-08-29 ENCOUNTER — TELEPHONE (OUTPATIENT)
Dept: CARE COORDINATION | Age: 79
End: 2024-08-29

## 2024-09-03 ENCOUNTER — ORDER TRANSCRIPTION (OUTPATIENT)
Dept: PHYSICAL THERAPY | Facility: HOSPITAL | Age: 79
End: 2024-09-03

## 2024-09-03 DIAGNOSIS — C76.0 HEAD AND NECK CANCER (HCC): Primary | ICD-10-CM

## 2024-09-03 DIAGNOSIS — K08.89 PROBLEMS WITH MASTICATION: ICD-10-CM

## 2024-09-05 ENCOUNTER — TELEPHONE (OUTPATIENT)
Dept: CARE COORDINATION | Age: 79
End: 2024-09-05

## 2024-09-09 ENCOUNTER — OFFICE VISIT (OUTPATIENT)
Dept: PODIATRY CLINIC | Facility: CLINIC | Age: 79
End: 2024-09-09
Payer: MEDICARE

## 2024-09-09 DIAGNOSIS — L60.0 ONYCHOCRYPTOSIS: ICD-10-CM

## 2024-09-09 DIAGNOSIS — E11.40 DIABETIC NEUROPATHY WITH NEUROLOGIC COMPLICATION (HCC): ICD-10-CM

## 2024-09-09 DIAGNOSIS — E11.49 DIABETIC NEUROPATHY WITH NEUROLOGIC COMPLICATION (HCC): ICD-10-CM

## 2024-09-09 DIAGNOSIS — I73.89 OTHER SPECIFIED PERIPHERAL VASCULAR DISEASES (HCC): Primary | ICD-10-CM

## 2024-09-09 DIAGNOSIS — B35.1 ONYCHOMYCOSIS: ICD-10-CM

## 2024-09-09 DIAGNOSIS — M79.674 PAIN OF TOE OF RIGHT FOOT: ICD-10-CM

## 2024-09-09 DIAGNOSIS — L60.0 INGROWING TOENAIL WITH INFECTION: ICD-10-CM

## 2024-09-09 DIAGNOSIS — N18.30 STAGE 3 CHRONIC KIDNEY DISEASE, UNSPECIFIED WHETHER STAGE 3A OR 3B CKD (HCC): ICD-10-CM

## 2024-09-09 DIAGNOSIS — B35.1 OM (ONYCHOMYCOSIS): ICD-10-CM

## 2024-09-09 DIAGNOSIS — L60.0 OC (ONYCHOCRYPTOSIS): ICD-10-CM

## 2024-09-09 PROBLEM — E11.42 POLYNEUROPATHY DUE TO TYPE 2 DIABETES MELLITUS (HCC): Status: ACTIVE | Noted: 2023-02-15

## 2024-09-09 PROBLEM — C76.0 HEAD AND NECK CANCER (HCC): Status: ACTIVE | Noted: 2024-08-30

## 2024-09-09 PROBLEM — H44.20 DEGENERATIVE PROGRESSIVE HIGH MYOPIA: Status: ACTIVE | Noted: 2022-04-12

## 2024-09-09 PROBLEM — H43.819 VITREOUS DEGENERATION: Status: ACTIVE | Noted: 2022-04-12

## 2024-09-09 PROBLEM — D68.59 HYPERCOAGULABLE STATE (HCC): Status: ACTIVE | Noted: 2024-06-06

## 2024-09-09 PROBLEM — H35.3290 EXUDATIVE AGE-RELATED MACULAR DEGENERATION (HCC): Status: ACTIVE | Noted: 2022-04-12

## 2024-09-09 PROBLEM — R22.1 NECK MASS: Status: ACTIVE | Noted: 2024-08-09

## 2024-09-09 PROBLEM — M25.462 EFFUSION OF LEFT KNEE: Status: ACTIVE | Noted: 2024-08-09

## 2024-09-09 PROBLEM — M79.18 MYOFASCIAL PAIN: Status: ACTIVE | Noted: 2023-11-06

## 2024-09-09 PROBLEM — K59.1 FUNCTIONAL DIARRHEA: Status: ACTIVE | Noted: 2024-06-20

## 2024-09-09 PROBLEM — H35.379 EPIRETINAL MEMBRANE: Status: ACTIVE | Noted: 2022-04-12

## 2024-09-09 PROBLEM — M99.73 CONNECTIVE TISSUE AND DISC STENOSIS OF INTERVERTEBRAL FORAMINA OF LUMBAR REGION: Status: ACTIVE | Noted: 2024-06-10

## 2024-09-09 PROCEDURE — 99213 OFFICE O/P EST LOW 20 MIN: CPT | Performed by: PODIATRIST

## 2024-09-09 RX ORDER — DEXTROSE MONOHYDRATE 25 G/50ML
50 INJECTION, SOLUTION INTRAVENOUS
OUTPATIENT
Start: 2024-09-09

## 2024-09-09 RX ORDER — NICOTINE POLACRILEX 4 MG
15 LOZENGE BUCCAL
OUTPATIENT
Start: 2024-09-09

## 2024-09-09 RX ORDER — SODIUM CHLORIDE, SODIUM LACTATE, POTASSIUM CHLORIDE, CALCIUM CHLORIDE 600; 310; 30; 20 MG/100ML; MG/100ML; MG/100ML; MG/100ML
INJECTION, SOLUTION INTRAVENOUS CONTINUOUS
OUTPATIENT
Start: 2024-09-09

## 2024-09-09 RX ORDER — NICOTINE POLACRILEX 4 MG
30 LOZENGE BUCCAL
OUTPATIENT
Start: 2024-09-09

## 2024-09-09 NOTE — PROGRESS NOTES
You Titus is a 79 year old male.   Chief Complaint   Patient presents with    Follow - Up     F/u toenail care. Declines pain.         HPI:   Patient returns to clinic for routine toenail care.  None of his toenails are bothering him at this point in time however he has recently been diagnosed with throat cancer and later in this month he will begin chemotherapy and radiation treatment for that.  He also recently had therapy on his knee and the physical therapist rub so hard created evidently a hematoma so he is kind of back to the beginning with his rehab on his knee so he is very discouraged at today's visit.  At today's visit reviewed nurse's history as taken above, allergies medications and medical history as documented below.  All changes duly noted  Allergies: Tizanidine   Current Outpatient Medications   Medication Sig Dispense Refill    glipiZIDE ER 2.5 MG Oral Tablet 24 Hr Take 1 tablet (2.5 mg total) by mouth daily.      mupirocin 2 % External Ointment Apply a small amount to the affected nail edge twice daily after soaking and cover with a Band-Aid, 30 g 0    traMADol 50 MG Oral Tab Take 1 tablet (50 mg total) by mouth every 12 (twelve) hours as needed for Pain. 30 tablet 0    Multiple Vitamins-Minerals (PRESERVISION AREDS) Oral Tab Take by mouth daily.      AMLODIPINE 5 MG Oral Tab TAKE 1 TABLET(5 MG) BY MOUTH DAILY 90 tablet 1    predniSONE 2.5 MG Oral Tab Take 1 tablet (2.5 mg total) by mouth daily. 90 tablet 0    XARELTO 10 MG Oral Tab TAKE 1 TABLET(10 MG) BY MOUTH DAILY WITH FOOD 30 tablet 2    ALPRAZolam 0.25 MG Oral Tab Take 1 tablet (0.25 mg total) by mouth daily. 30 tablet 0    gabapentin 300 MG Oral Cap Take 1 capsule (300 mg total) by mouth in the morning and 1 capsule (300 mg total) before bedtime.      metFORMIN 500 MG Oral Tab Take 1 tablet (500 mg total) by mouth 2 (two) times daily. 180 tablet 3    ALLOPURINOL 100 MG Oral Tab TAKE 1 TABLET(100 MG) BY MOUTH DAILY 90 tablet 2    losartan  100 MG Oral Tab Take 1 tablet (100 mg total) by mouth daily. 90 tablet 1    rosuvastatin 5 MG Oral Tab Take 1 tablet (5 mg total) by mouth daily. 90 tablet 3    ACCU-CHEK FASTCLIX LANCETS Does not apply Misc Test once daily 100 each 4    ACCU-CHEK SMARTVIEW In Vitro Strip Test once daily 150 strip 4    Probiotic Product (ALIGN) 4 MG Oral Cap Take 4 mg by mouth daily.        HYDROcodone-acetaminophen 5-325 MG Oral Tab Take 1 tablet by mouth every 6 (six) hours as needed for Pain. (Patient not taking: Reported on 6/25/2024) 10 tablet 0    mupirocin 2 % External Ointment Apply a small amount to the affected nail edge twice daily after soaking and cover with a Band-Aid, 30 g 1      Past Medical History:    BACK PAIN    Back problem    DIABETES    DVT (deep venous thrombosis) (HCC)    bilateral legs- different times though    Gastritis    H pylori negative    GOUT    Gout    High blood pressure    High cholesterol    HLA B27 (HLA B27 positive)    HYPERLIPIDEMIA    HYPERTENSION    Neuropathy    shoulders arms and into the hands    PE (physical exam), annual    PMR (polymyalgia rheumatica) (HCC)    Pulmonary nodule    repeat CT chest  1/2016    Thrombocytopenia (HCC)    Type II or unspecified type diabetes mellitus without mention of complication, not stated as uncontrolled    Unspecified essential hypertension    Visual impairment    glasses      Past Surgical History:   Procedure Laterality Date    Anal sphincterotomy N/A 11/2/2015    Procedure: EXAM UNDER ANESTHESIA, RECTAL;  Surgeon: Eben Ann MD;  [place] [facility]    Back surgery  03/15/2018    L3-L5 Decompression, L4-L5 Discectomy uninstrumented fusion     Back surgery  05/24/2018    C5-C7 ACDF     Colonoscopy  3/10    polyps, due 2013    Colonoscopy  3/9/19= Polyp (TA), Bx: Normal colon    Repeat PRN    Colonoscopy N/A 3/9/2019    Procedure: COLONOSCOPY, POSSIBLE BIOPSY, POSSIBLE POLYPECTOMY 52545;  Surgeon: Camden Coardo MD;  Location:  [facility]    Colonoscopy & polypectomy  3/8/13= Polyps (TA)    Repeat 2018    Colonoscopy,remv lesn,snare  3/5/2013    Procedure: ESOPHAGOGASTRODUODENOSCOPY, COLONOSCOPY, POSSIBLE BIOPSY, POSSIBLE POLYPECTOMY 27558,38976;  Surgeon: Camden Corado MD;  Location: [facility]    Other surgical history      pilonidal cyst    Other surgical history      hammertoes and bunionb/l     Other surgical history      C-spine fusion    Patient documented not to have experienced any of the following events  3/5/2013    Procedure: ESOPHAGOGASTRODUODENOSCOPY, COLONOSCOPY, POSSIBLE BIOPSY, POSSIBLE POLYPECTOMY 32704,31098;  Surgeon: Camden Corado MD;  Location: [facility]    Patient withough preoperative order for iv antibiotic surgical site infection prophylaxis.  3/5/2013    Procedure: ESOPHAGOGASTRODUODENOSCOPY, COLONOSCOPY, POSSIBLE BIOPSY, POSSIBLE POLYPECTOMY 98177,[id number];  Surgeon: Camden Corado MD;  Location: [facility]    Tonsillectomy      Upper gi endoscopy performed  3/8/13= Gastritis    Normal gastric/duodenal Bx: Normal. No H pylori    Upper gi endoscopy,biopsy  3/5/2013    Procedure: ESOPHAGOGASTRODUODENOSCOPY, COLONOSCOPY, POSSIBLE BIOPSY, POSSIBLE POLYPECTOMY 37535,79188;  Surgeon: Camden Corado MD;  Location: [facility]      Family History   Problem Relation Age of Onset    Other (Other) Mother         scleroderma    Diabetes Neg     Cancer Neg     Heart Disorder Neg     Hypertension Neg     Lipids Neg       Social History     Socioeconomic History    Marital status:    Tobacco Use    Smoking status: Former     Current packs/day: 0.50     Average packs/day: 0.5 packs/day for 50.0 years (25.0 ttl pk-yrs)     Types: Cigars, Cigarettes    Smokeless tobacco: Never   Vaping Use    Vaping status: Every Day   Substance and Sexual Activity    Alcohol use: Yes     Alcohol/week: 7.0 standard drinks of alcohol     Types: 7 Standard drinks or  equivalent per week    Drug use: No           REVIEW OF SYSTEMS:   Today reviewed systens as documented below  GENERAL HEALTH: feels well otherwise  SKIN: Refer to exam below  RESPIRATORY: denies shortness of breath with exertion  CARDIOVASCULAR: denies chest pain on exertion  GI: denies abdominal pain and denies heartburn  NEURO: denies headaches    EXAM:   There were no vitals taken for this visit.  GENERAL: well developed, well nourished, in no apparent distress  EXTREMITIES:   1. Integument: The skin on his feet is warm and dry the surgical scar over the fourth metatarsal resection of it was taken out for partial fourth ray amputation.  He has thickening of his toenails 1-5 on on the left and 1-3 and #5 on the right.  These are all characteristic of onychomycosis.   2. Vascular: Patient has palpable pulses dorsalis pedis but I could not palpate posterior tibial there is some occlusion near   3. Neurologic: Patient has pain sensation intact but does have some loss of protective sensation.  Cannot feel the [place] 10 g filament in all dermatomes.   4. 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    ASSESSMENT AND PLAN:   Diagnoses and all orders for this visit:    Other specified peripheral vascular diseases (HCC)    Diabetic neuropathy with neurologic complication (HCC)    Onychomycosis    Onychocryptosis    Ingrowing toenail with infection    Pain of toe of right foot    Stage 3 chronic kidney disease, unspecified whether stage 3a or 3b CKD (HCC)    OC (onychocryptosis)    OM (onychomycosis)        Plan: Today using a nail nippers were trimmed and debrided toenails 1-5 manually and mechanically in girth and width as far down to healthy tissue as possible on both feet.  This was done uneventfully there was no hemorrhage.  There is mild incurvation of the medial and lateral nail borders of the hallux which using a slant back technique were removed and they would not get  ingrown.   At today's visit I reminded the patient about daily foot checks  Reminded patient again of proper control of his diabetes to help prevent any severe complications in his feet  Proper foot hygiene moisturizing except between the toes was reviewed  Advised follow-up again every 2 to 3 months for routine care but emphasized to him the importance of coming in sooner if he notices any problems.    The patient indicates understanding of these issues and agrees to the plan.    Anibal Crum DPM

## 2024-09-10 ENCOUNTER — ANESTHESIA EVENT (OUTPATIENT)
Dept: ENDOSCOPY | Facility: HOSPITAL | Age: 79
End: 2024-09-10
Payer: MEDICARE

## 2024-09-11 ENCOUNTER — HOSPITAL ENCOUNTER (OUTPATIENT)
Facility: HOSPITAL | Age: 79
Setting detail: HOSPITAL OUTPATIENT SURGERY
Discharge: HOME | End: 2024-09-11
Attending: INTERNAL MEDICINE | Admitting: INTERNAL MEDICINE
Payer: MEDICARE

## 2024-09-11 ENCOUNTER — ANESTHESIA (OUTPATIENT)
Dept: ENDOSCOPY | Facility: HOSPITAL | Age: 79
End: 2024-09-11
Payer: MEDICARE

## 2024-09-11 VITALS
SYSTOLIC BLOOD PRESSURE: 157 MMHG | BODY MASS INDEX: 24.77 KG/M2 | HEIGHT: 70 IN | DIASTOLIC BLOOD PRESSURE: 74 MMHG | TEMPERATURE: 97 F | RESPIRATION RATE: 16 BRPM | HEART RATE: 71 BPM | WEIGHT: 173 LBS | OXYGEN SATURATION: 100 %

## 2024-09-11 PROCEDURE — 0DH63UZ INSERTION OF FEEDING DEVICE INTO STOMACH, PERCUTANEOUS APPROACH: ICD-10-PCS | Performed by: INTERNAL MEDICINE

## 2024-09-11 DEVICE — IMPLANTABLE DEVICE: Type: IMPLANTABLE DEVICE | Status: FUNCTIONAL

## 2024-09-11 RX ORDER — HYDROCODONE BITARTRATE AND ACETAMINOPHEN 5; 325 MG/1; MG/1
2 TABLET ORAL ONCE AS NEEDED
OUTPATIENT
Start: 2024-09-11 | End: 2024-09-11

## 2024-09-11 RX ORDER — ACETAMINOPHEN 500 MG
1000 TABLET ORAL ONCE AS NEEDED
OUTPATIENT
Start: 2024-09-11 | End: 2024-09-11

## 2024-09-11 RX ORDER — LABETALOL HYDROCHLORIDE 5 MG/ML
5 INJECTION, SOLUTION INTRAVENOUS EVERY 5 MIN PRN
OUTPATIENT
Start: 2024-09-11 | End: 2024-09-11

## 2024-09-11 RX ORDER — DEXTROSE MONOHYDRATE 25 G/50ML
50 INJECTION, SOLUTION INTRAVENOUS
OUTPATIENT
Start: 2024-09-11

## 2024-09-11 RX ORDER — NICOTINE POLACRILEX 4 MG
15 LOZENGE BUCCAL
OUTPATIENT
Start: 2024-09-11

## 2024-09-11 RX ORDER — HYDROMORPHONE HYDROCHLORIDE 1 MG/ML
0.4 INJECTION, SOLUTION INTRAMUSCULAR; INTRAVENOUS; SUBCUTANEOUS EVERY 5 MIN PRN
OUTPATIENT
Start: 2024-09-11 | End: 2024-09-11

## 2024-09-11 RX ORDER — HYDROMORPHONE HYDROCHLORIDE 1 MG/ML
0.2 INJECTION, SOLUTION INTRAMUSCULAR; INTRAVENOUS; SUBCUTANEOUS EVERY 5 MIN PRN
OUTPATIENT
Start: 2024-09-11 | End: 2024-09-11

## 2024-09-11 RX ORDER — HYDROMORPHONE HYDROCHLORIDE 1 MG/ML
0.6 INJECTION, SOLUTION INTRAMUSCULAR; INTRAVENOUS; SUBCUTANEOUS EVERY 5 MIN PRN
OUTPATIENT
Start: 2024-09-11 | End: 2024-09-11

## 2024-09-11 RX ORDER — SODIUM CHLORIDE, SODIUM LACTATE, POTASSIUM CHLORIDE, CALCIUM CHLORIDE 600; 310; 30; 20 MG/100ML; MG/100ML; MG/100ML; MG/100ML
INJECTION, SOLUTION INTRAVENOUS CONTINUOUS
OUTPATIENT
Start: 2024-09-11

## 2024-09-11 RX ORDER — LIDOCAINE HYDROCHLORIDE 10 MG/ML
INJECTION, SOLUTION EPIDURAL; INFILTRATION; INTRACAUDAL; PERINEURAL AS NEEDED
Status: DISCONTINUED | OUTPATIENT
Start: 2024-09-11 | End: 2024-09-11 | Stop reason: SURG

## 2024-09-11 RX ORDER — ONDANSETRON 2 MG/ML
4 INJECTION INTRAMUSCULAR; INTRAVENOUS EVERY 6 HOURS PRN
OUTPATIENT
Start: 2024-09-11

## 2024-09-11 RX ORDER — HYDROCODONE BITARTRATE AND ACETAMINOPHEN 5; 325 MG/1; MG/1
1 TABLET ORAL ONCE AS NEEDED
OUTPATIENT
Start: 2024-09-11 | End: 2024-09-11

## 2024-09-11 RX ORDER — METOCLOPRAMIDE HYDROCHLORIDE 5 MG/ML
10 INJECTION INTRAMUSCULAR; INTRAVENOUS EVERY 8 HOURS PRN
OUTPATIENT
Start: 2024-09-11

## 2024-09-11 RX ORDER — CEFAZOLIN SODIUM 1 G/3ML
INJECTION, POWDER, FOR SOLUTION INTRAMUSCULAR; INTRAVENOUS AS NEEDED
Status: DISCONTINUED | OUTPATIENT
Start: 2024-09-11 | End: 2024-09-11 | Stop reason: SURG

## 2024-09-11 RX ORDER — NICOTINE POLACRILEX 4 MG
30 LOZENGE BUCCAL
OUTPATIENT
Start: 2024-09-11

## 2024-09-11 RX ORDER — MEPERIDINE HYDROCHLORIDE 25 MG/ML
12.5 INJECTION INTRAMUSCULAR; INTRAVENOUS; SUBCUTANEOUS AS NEEDED
OUTPATIENT
Start: 2024-09-11

## 2024-09-11 RX ORDER — MIDAZOLAM HYDROCHLORIDE 1 MG/ML
1 INJECTION INTRAMUSCULAR; INTRAVENOUS EVERY 5 MIN PRN
OUTPATIENT
Start: 2024-09-11 | End: 2024-09-11

## 2024-09-11 RX ORDER — NALOXONE HYDROCHLORIDE 0.4 MG/ML
80 INJECTION, SOLUTION INTRAMUSCULAR; INTRAVENOUS; SUBCUTANEOUS AS NEEDED
OUTPATIENT
Start: 2024-09-11 | End: 2024-09-11

## 2024-09-11 RX ADMIN — LIDOCAINE HYDROCHLORIDE 25 MG: 10 INJECTION, SOLUTION EPIDURAL; INFILTRATION; INTRACAUDAL; PERINEURAL at 13:37:00

## 2024-09-11 RX ADMIN — CEFAZOLIN SODIUM 2 G: 1 INJECTION, POWDER, FOR SOLUTION INTRAMUSCULAR; INTRAVENOUS at 13:39:00

## 2024-09-11 NOTE — ANESTHESIA POSTPROCEDURE EVALUATION
[facility]    You Titus Patient Status:  Hospital Outpatient Surgery   Age/Gender 79 year old male MRN [id number]   Location [facility] Attending Camden Corado MD   Hosp Day # 0 PCP Naldo Torre MD       Anesthesia Post-op Note    PERCUTANEOUS ENDOSCOPIC GASTROSTOMY PLACEMENT    Procedure Summary       Date: 09/11/24 Room / Location:  ENDOSCOPY 03 /  ENDOSCOPY    Anesthesia Start: 1336 Anesthesia Stop: 1357    Procedure: PERCUTANEOUS ENDOSCOPIC GASTROSTOMY PLACEMENT Diagnosis: (normal egd, peg)    Surgeons: Camden Corado MD Anesthesiologist: Terrance Parson MD    Anesthesia Type: MAC ASA Status: 2            Anesthesia Type: No value filed.    Vitals Value Taken Time   /78 09/11/24 1416   Temp  09/11/24 1421   Pulse 73 09/11/24 1421   Resp 16 09/11/24 1356   SpO2 100 % 09/11/24 1421   Vitals shown include unfiled device data.    Patient Location: PACU    Anesthesia Type: general    Airway Patency: patent    Postop Pain Control: adequate    Mental Status: mildly sedated but able to meaningfully participate in the post-anesthesia evaluation    Nausea/Vomiting: none    Cardiopulmonary/Hydration status: stable euvolemic    Complications: no apparent anesthesia related complications    Postop vital signs: stable    Dental Exam: Unchanged from Preop    Patient to be discharged from PACU when criteria met.

## 2024-09-11 NOTE — DISCHARGE INSTRUCTIONS
[facility]    Procedure(s): Upper Endoscopy    Findings:    1.  Normal endoscopy.  2.  G tube placed    Disposition: home    Patient Instructions:     1.  Resume Eliquis x 2 days (Friday)  2.  Follow up at [facility]      Camden Corado MD    Gastrostomy Tube Feeding   The gastrostomy tube (PEG) is a short feeding tube that goes directly into your stomach for nutritional support and medication delivery.     Supplies you will need:   -60cc syringe   -Liquid food ( as recommended by your dietician or physician)   -IV pole or wall hook to hang the food container while receiving the feeding    The Tube Feeding:   Attach a clean 60cc syringe to the end of your feeding tube.     -Pull back on the plunger. You should see some gastric juices (yellow-green fluid). This is stomach content s and it tells you the tube is in your stomach.   -If you pull back more than 60 cc of fluid, do not give yourself food. Push back in the stomach contents, which contains important minerals, back into the tube.   -Then flush with 20 cc of water. Wait for a few hours and check again. Tell your visiting nurse or doctor if this occurs frequently .    To give your self the feeding:   -Attach a clean 60cc syringe to the end of your feeding without the plunger or attach a feeding tube kit with bag.     If you have a clamped tube:   -Open the clamp slowly to adjust the speed of the feeding.   -Your meal should last 45 minutes to an hour. It is important to sit up or prop your head up while receiving your feeding. If you choke or have difficulty breathing during a feeding, stop and call your doctor immediately.   -When the feeding is done, fill the syringe or bag with 20 cc of water to flush the tube.   -After the water is given, roll the clamp down to turn off and disconnect the syringe     If you do not have a clamped tube:   -Hold the tip of the tube above your waist, open end cap and insert syringe without the plunger tightly into  opening.   -Pour your desired tube feeding slowing into the syringe around 20-30 cc.’s . keep the syringe elevated. Allow the feeding to flow easily into your stomach. Refill as directed by your dietary regimen.   -Your meal should last 45 minutes to an hour. It is important to sit up or prop your head up while receiving your feeding. If you choke or have difficulty breathing during a feeding, stop and call your doctor immediately.   -When the feeding is done, fill the syringe with 20 cc of water to flush the tube.   -Wash out the syringe or bag after each with dishwashing liquid and water.Rinse thoroughly and air dry .       Taking your Medication   -Medications can be given through your PEG tube. Use the liquid form of your medication if it is available at your pharmacy. If the liquid form is not available, you must crush your pills.   -If the letters “SR” appear after the drug name on the label, this indicates the medication is “sustained-release.” Do not crush these pills. Check with your pharmacist or nurse to be sure that your pills may be crushed and given at the same time.     To give your medication, follow these steps:   -Attach a 60 cc syringe to your tube without the plunger.   -Flush your feeding tube with 20cc of water before giving your medication.   -Crush the pills. To crush your pills, place them in a plastic bag, and then use a rolling pin or soup can as a crushing instrument. After you have crushed your pills finely, let the pieces dissolve in warm water (not hot water) so that pieces will not clog your tube.   -Place your medication into the syringe   -Flush the tube with 20cc of water after giving your medication.   -Some medications should be given with food; others on an empty stomach       PEG tube site care   While the site heals, clean around the site daily. Follow these steps:   1. Choose any of these products and moisten it with warm, soapy water:   Cotton-tip applicator or swab   Clean,  soft washcloth     2. Cleanse the site using outward circles around the site    3. After cleaning, rinse the area around the site with water and any of the products listed above.     4. Allow skin to air dry.     You may apply antibiotic ointment if your doctor says you may.     Observe your site daily for redness, pain, swelling, or unusual drainage around the tube. If you notice any of these signs, call your doctor.     PEG Tube Care   To prevent a clogged feeding tube, flush your tube with water each time after giving a feeding or medication.   -If your feeding tube becomes clogged, you can use these methods:   -Place the syringe into your feeding tube, and pull back on the plunger.   -Flush your tube with warm tap water.   -You may use a small amount of Coca-Cola to flush the PEG tube   -If you cannot unclog your tube, call your doctor       When to call your doctor   -If you have choking or difficulty breathing during a feeding, stop the feeding and call your Doctor immediately.   -If you cannot unclog your tube, call your doctor immediately.   -Call your doctor if the following persist: diarrhea, constipation, nausea, or dehydration   -Call your doctor if you have redness, pain, swelling, or unusual drainage at the site.     PEG tube CARE when not in use   If you are currently not using your tube for feedings or medications, flush the tube with 20 cc of water daily     Call your GI physician to discuss discontinuance of PEG tube feeding.

## 2024-09-11 NOTE — H&P
[facility] GASTROENTEROLOGY    REFERRING PHYSICIAN: Dr. Torre    You Titus is a 79 year old male.  Tongue cancer Dx needs PEG prior to chemo XRT    PROCEDURE: PEG    Allergies: Tizanidine  No current outpatient medications on file.     Past Medical History:    BACK PAIN    Back problem    Cancer (HCC)    TONGUE    DIABETES    DVT (deep venous thrombosis) (HCC)    bilateral legs- different times though    Esophageal reflux    Gastritis    H pylori negative    GOUT    Gout    High blood pressure    High cholesterol    HLA B27 (HLA B27 positive)    HYPERLIPIDEMIA    HYPERTENSION    Neuropathy    shoulders arms and into the hands    PE (physical exam), annual    PMR (polymyalgia rheumatica) (HCC)    Problems with swallowing    Pulmonary nodule    repeat CT chest  1/2016    Renal disorder    CKD STAGE 3 RECONCILLED FROM PROBLEM LIST    Thrombocytopenia (HCC)    Type II or unspecified type diabetes mellitus without mention of complication, not stated as uncontrolled    Unspecified essential hypertension    Visual impairment    READING GLASSES     Past Surgical History:   Procedure Laterality Date    Anal sphincterotomy N/A 11/2/2015    Procedure: EXAM UNDER ANESTHESIA, RECTAL;  Surgeon: Eben Ann MD;  Location: [facility]    Back surgery  03/15/2018    L3-L5 Decompression, L4-L5 Discectomy uninstrumented fusion     Back surgery  05/24/2018    C5-C7 ACDF     Colonoscopy  3/10    polyps, due 2013    Colonoscopy  3/9/19= Polyp (TA), Bx: Normal colon    Repeat PRN    Colonoscopy N/A 3/9/2019    Procedure: COLONOSCOPY, POSSIBLE BIOPSY, POSSIBLE POLYPECTOMY 11476;  Surgeon: Camden Corado MD;  Location: [facility]    Colonoscopy & polypectomy  3/8/13= Polyps (TA)    Repeat 2018    Colonoscopy,remv lesn,snare  3/5/2013    Procedure: ESOPHAGOGASTRODUODENOSCOPY, COLONOSCOPY, POSSIBLE BIOPSY, POSSIBLE POLYPECTOMY 66398,48688;  Surgeon: Camden Corado MD;  Location: [facility]  [facility]    Other surgical history      pilonidal cyst    Other surgical history      hammertoes and bunionb/l     Other surgical history      C-spine fusion    Patient documented not to have experienced any of the following events  3/5/2013    Procedure: ESOPHAGOGASTRODUODENOSCOPY, COLONOSCOPY, POSSIBLE BIOPSY, POSSIBLE POLYPECTOMY 26189,20496;  Surgeon: Camden Corado MD;  Location: [facility]    Patient withough preoperative order for iv antibiotic surgical site infection prophylaxis.  3/5/2013    Procedure: ESOPHAGOGASTRODUODENOSCOPY, COLONOSCOPY, POSSIBLE BIOPSY, POSSIBLE POLYPECTOMY 14706,40102;  Surgeon: Camden Corado MD;  Location: [facility]    Tonsillectomy      Upper gi endoscopy performed  3/8/13= Gastritis    Normal gastric/duodenal Bx: Normal. No H pylori    Upper gi endoscopy,biopsy  3/5/2013    Procedure: ESOPHAGOGASTRODUODENOSCOPY, COLONOSCOPY, POSSIBLE BIOPSY, POSSIBLE POLYPECTOMY 92936,75776;  Surgeon: Camden Corado MD;  Location: [facility]     Social History     Socioeconomic History    Marital status:    Tobacco Use    Smoking status: Former     Current packs/day: 0.50     Average packs/day: 0.5 packs/day for 50.0 years (25.0 ttl pk-yrs)     Types: Cigars, Cigarettes    Smokeless tobacco: Never   Vaping Use    Vaping status: Every Day   Substance and Sexual Activity    Alcohol use: Yes     Alcohol/week: 7.0 standard drinks of alcohol     Types: 7 Standard drinks or equivalent per week    Drug use: No     Social Determinants of Health     Financial Resource Strain: Low Risk  (8/6/2024)    Received from [facility]    Financial Resource Strain     In the past year, have you or any family members you live with been unable to get any of the following when it was really needed? Check all that apply.: None   Food Insecurity: Low Risk  (8/6/2024)    Received from [facility]    Food Insecurity     Within the past 12 months,  you worried that your food would run out before you got money to buy more.  : Never true     Within the past 12 months, the food you bought just didn't last and you didn't have money to get more. : Never true   Transportation Needs: Not At Risk (8/6/2024)    Received from [facility]    Transportation Needs     In the past 12 months, has lack of reliable transportation kept you from medical appointments, meetings, work or from getting things needed for daily living? : No   Social Connections: Low Risk  (8/6/2024)    Received from [facility]    Social Connections     How often do you see or talk to people that you care about and feel close to? (For example: talking to friends on the phone, visiting friends or family, going to Hindu or club meetings): 5 or more times a week    [facility] [place] [facility]         Exam:  /85 (BP Location: Left arm)   Pulse 85   Temp 97.1 °F (36.2 °C) (Temporal)   Resp 16   Ht 5' 10\" (1.778 m)   Wt 173 lb (78.5 kg)   SpO2 100%   BMI 24.82 kg/m²  - Body mass index is 24.82 kg/m²., A+0x3, HEENT: non-icteric, LUNGS: CTA, HEART: RRR, ABDOMEN:+BS, soft, non-tender, no guarding, EXTREM: no peripheral edema      ASSESSMENT AND PLAN:  You Titus is a 79 year old male.  Tongue cancer PEG    1.  PEG    I have discussed the risks and benefits and alternatives with the patient/family.  They understand and agree to proceed with the plan of care.    I have reviewed the History and Physical performed.  I have examined this patient today and any changes are documented above.     Camden Corado MD  9/11/2024  12:45 PM

## 2024-09-11 NOTE — OPERATIVE REPORT
[facility]    You Titus Patient Status:  Hospital Outpatient Surgery    7/15/1945 MRN [id number]   Location [facility] ENDOSCOPY PAIN [facility]ENTER Attending Camden Corado MD   Hosp Day # 0 PCP Naldo Torre MD         PATIENT NAME: You Titus  DATE OF OPERATION: 2024    PREOPERATIVE DIAGNOSIS:  Tongue cancer planning XRT chemo  Need for PEG placement  POSTOPERATIVE DIAGNOSIS:  Normal EGD.PEG placed    PROCEDURE PERFORMED: Upper endoscopy and percutaneous endoscopic gastrostomy tube (PEG tube) placement with MAC sedation  SURGEON: Camden Corado MD   MEDICATIONS: MAC IV in divided doses under the supervision of Anesthesia.  PROCEDURE AND FINDINGS: The patient was placed into the supine position after informed consent was obtained.  All questions were answered.  MAC IV sedation was administered.  The Olympus video gastroscope was introduced into the mouth and passed to the third portion of the duodenum.  The entire examined esophagus was normal.  The entire examined stomach,  including retroflexion view, was normal.  The entire examined duodenum was normal.   The gastroscope was withdrawn into the stomach.    An appropriate location was identified on the anterior abdominal wall with direct transillumination and excellent 1:1 finger palpation under direct endoscopic visualization.  The area was then draped and prepped in sterile fashion.  5 cc Lidocaine was used for topical anesthesia.  A 10 mm incision was made with a straight blade.  A trocar was passed through the anterior wall of the abdomen across the anterior under direct endoscopic visualization.  A wire was passed through the trocar and grabbed with a snare via the endoscope.  The wire, snare and endoscope were then withdrawn from the patient.  The G tube was attached to the wire and pulled through the anterior wall of the stomach and put into place.  The tube was then cut and covered with bacteriocidal ointment and pad.    The  gastroscope was once more passed through the mouth into the stomach.  The G tube was confirmed to be in appropriate position under endoscopic visualization.  The gastroscope was then removed from the patient.  The procedure was completed. There was a G tube implant placed with no significant blood loss.  The patient tolerated the procedure well.  There were no immediate post procedure complications.   Moderate sedation time:  None.  Deep sedation provided by anesthesia    RECOMMENDATIONS:  Follow up Cancer Center    Camden Corado MD

## 2024-09-11 NOTE — ANESTHESIA PREPROCEDURE EVALUATION
PRE-OP EVALUATION    Patient Name: You Titus    Admit Diagnosis: other dysphagia,tongue cancer    Pre-op Diagnosis: other dysphagia,tongue cancer    PERCUTANEOUS ENDOSCOPIC GASTROSTOMY PLACEMENT    Anesthesia Procedure: PERCUTANEOUS ENDOSCOPIC GASTROSTOMY PLACEMENT    Surgeons and Role:     * Camden Corado MD - Primary    Pre-op vitals reviewed.  Temp: 97.1 °F (36.2 °C)  Pulse: 85  Resp: 16  BP: 137/85  SpO2: 100 %  Body mass index is 24.82 kg/m².    Current medications reviewed.  Hospital Medications:  No current facility-administered medications on file as of 9/11/2024.       Outpatient Medications:     Medications Prior to Admission   Medication Sig Dispense Refill Last Dose    glipiZIDE ER 2.5 MG Oral Tablet 24 Hr Take 1 tablet (2.5 mg total) by mouth daily.   9/10/2024    mupirocin 2 % External Ointment Apply a small amount to the affected nail edge twice daily after soaking and cover with a Band-Aid, 30 g 0     traMADol 50 MG Oral Tab Take 1 tablet (50 mg total) by mouth every 12 (twelve) hours as needed for Pain. 30 tablet 0 9/10/2024    Multiple Vitamins-Minerals (PRESERVISION AREDS) Oral Tab Take by mouth daily.       AMLODIPINE 5 MG Oral Tab TAKE 1 TABLET(5 MG) BY MOUTH DAILY 90 tablet 1 Past Week    predniSONE 2.5 MG Oral Tab Take 1 tablet (2.5 mg total) by mouth daily. 90 tablet 0     XARELTO 10 MG Oral Tab TAKE 1 TABLET(10 MG) BY MOUTH DAILY WITH FOOD 30 tablet 2 9/8/2024    ALPRAZolam 0.25 MG Oral Tab Take 1 tablet (0.25 mg total) by mouth daily. 30 tablet 0 9/10/2024    gabapentin 300 MG Oral Cap Take 1 capsule (300 mg total) by mouth in the morning and 1 capsule (300 mg total) before bedtime.       metFORMIN 500 MG Oral Tab Take 1 tablet (500 mg total) by mouth 2 (two) times daily. 180 tablet 3 9/10/2024    ALLOPURINOL 100 MG Oral Tab TAKE 1 TABLET(100 MG) BY MOUTH DAILY 90 tablet 2     losartan 100 MG Oral Tab Take 1 tablet (100 mg total) by mouth daily. 90 tablet 1     rosuvastatin 5 MG Oral  Tab Take 1 tablet (5 mg total) by mouth daily. 90 tablet 3     Probiotic Product (ALIGN) 4 MG Oral Cap Take 4 mg by mouth daily.         ACCU-CHEK FASTCLIX LANCETS Does not apply Misc Test once daily 100 each 4     ACCU-CHEK SMARTVIEW In Vitro Strip Test once daily 150 strip 4        Allergies: Tizanidine      Anesthesia Evaluation    Patient summary reviewed.    Anesthetic Complications           GI/Hepatic/Renal      (+) GERD                           Cardiovascular      ECG reviewed.            (+) hypertension                                     Endo/Other      (+) diabetes                            Pulmonary                           Neuro/Psych                 (+) neuromuscular disease             Patient Active Problem List:     Essential hypertension, benign     Special screening for malignant neoplasms, colon     Thrombocytopenia (HCC)     Anxiety     Special screening for malignant neoplasm of prostate     Gout     DNS (deviated nasal septum)     First degree hemorrhoids     Aortic atherosclerosis ([facility])     Vitamin D deficiency     Upper back pain     Recurrent deep vein thrombosis (DVT) ([facility])     Type 2 diabetes mellitus with stage 3 chronic kidney disease, without long-term current use of insulin (HCC)     Rotator cuff syndrome of both shoulders     Lumbar radiculopathy     Chronic bilateral low back pain with bilateral sciatica     Neurogenic claudication due to lumbar spinal stenosis     HNP (herniated nucleus pulposus), lumbar     S/P lumbar laminectomy     S/P lumbar fusion     Brachial (cervical) neuritis     Spinal stenosis in cervical region     Pulmonary embolism (HCC)     Atherosclerosis of both carotid arteries     Preop exam for internal medicine     Inflammatory polyarthritis ([facility])     PMR (polymyalgia rheumatica) ([facility])     Chondrocalcinosis     PAD (peripheral artery disease) ([facility])     CKD (chronic kidney disease) stage 3, GFR 30-59 ml/min ([facility])     Iron deficiency anemia due to chronic  blood loss     Iron deficiency anemia     HLA B27 (HLA B27 positive)     Diabetic neuropathy with neurologic complication (HCC)     Right foot pain     Primary osteoarthritis of both knees     Immunocompromised state due to drug therapy (HCC)     Osteomyelitis (HCC)     Connective tissue and disc stenosis of intervertebral foramina of lumbar region     Degenerative progressive high myopia     Effusion of left knee     Epiretinal membrane     Exudative age-related macular degeneration (HCC)     Functional diarrhea     Head and neck cancer (HCC)     Hypercoagulable state (HCC)     Ingrown nail of great toe     Myofascial pain     Neck mass     Polyneuropathy due to type 2 diabetes mellitus (HCC)     Vitreous degeneration           Past Surgical History:   Procedure Laterality Date    Anal sphincterotomy N/A 11/2/2015    Procedure: EXAM UNDER ANESTHESIA, RECTAL;  Surgeon: Eben Ann MD;  Location: [facility]    Back surgery  03/15/2018    L3-L5 Decompression, L4-L5 Discectomy uninstrumented fusion     Back surgery  05/24/2018    C5-C7 ACDF     Colonoscopy  3/10    polyps, due 2013    Colonoscopy  3/9/19= Polyp (TA), Bx: Normal colon    Repeat PRN    Colonoscopy N/A 3/9/2019    Procedure: COLONOSCOPY, POSSIBLE BIOPSY, POSSIBLE POLYPECTOMY 48902;  Surgeon: Camden Corado MD;  Location: [facility]    Colonoscopy & polypectomy  3/8/13= Polyps (TA)    Repeat 2018    Colonoscopy,remv lesn,snare  3/5/2013    Procedure: ESOPHAGOGASTRODUODENOSCOPY, COLONOSCOPY, POSSIBLE BIOPSY, POSSIBLE POLYPECTOMY 21819,65522;  Surgeon: Camden Corado MD;  Location: [facility]    Other surgical history      pilonidal cyst    Other surgical history      hammertoes and bunionb/l     Other surgical history      C-spine fusion    Patient documented not to have experienced any of the following events  3/5/2013    Procedure: ESOPHAGOGASTRODUODENOSCOPY, COLONOSCOPY, POSSIBLE BIOPSY, POSSIBLE  POLYPECTOMY 54706,80735;  Surgeon: Camden Corado MD;  Location: [facility]    Patient withough preoperative order for iv antibiotic surgical site infection prophylaxis.  3/5/2013    Procedure: ESOPHAGOGASTRODUODENOSCOPY, COLONOSCOPY, POSSIBLE BIOPSY, POSSIBLE POLYPECTOMY 22175,[id number];  Surgeon: Camden Corado MD;  Location: [facility]    Tonsillectomy      Upper gi endoscopy performed  3/8/13= Gastritis    Normal gastric/duodenal Bx: Normal. No H pylori    Upper gi endoscopy,biopsy  3/5/2013    Procedure: ESOPHAGOGASTRODUODENOSCOPY, COLONOSCOPY, POSSIBLE BIOPSY, POSSIBLE POLYPECTOMY 97943,58908;  Surgeon: Camden Corado MD;  Location: [facility]     Social History     Socioeconomic History    Marital status:    Tobacco Use    Smoking status: Former     Current packs/day: 0.50     Average packs/day: 0.5 packs/day for 50.0 years (25.0 ttl pk-yrs)     Types: Cigars, Cigarettes    Smokeless tobacco: Never   Vaping Use    Vaping status: Every Day   Substance and Sexual Activity    Alcohol use: Yes     Alcohol/week: 7.0 standard drinks of alcohol     Types: 7 Standard drinks or equivalent per week    Drug use: No     History   Drug Use No     Available pre-op labs reviewed.               Airway      Mallampati: II  Mouth opening: >3 FB  TM distance: > 6 cm  Neck ROM: full Cardiovascular    Cardiovascular exam normal.         Dental    Dentition appears grossly intact         Pulmonary    Pulmonary exam normal.                 Other findings              ASA: 2   Plan: MAC  NPO status verified and patient meets guidelines.        Comment: Plan is MAC anesthesia, which likely will include deep sedation.  Implied that memory of procedure is unlikely although intraop recall, if it occurs, may be a reasonable and comfortable experience with this anesthetic.  Aware that general anesthesia is not intended though deep sedation may include brief moments of general anesthesia.    Questions answered. Accepts. The consent was signed without further questions.     Plan/risks discussed with: patient                Present on Admission:  **None**

## 2024-11-21 ENCOUNTER — OFFICE VISIT (OUTPATIENT)
Dept: PODIATRY CLINIC | Facility: CLINIC | Age: 79
End: 2024-11-21
Payer: MEDICARE

## 2024-11-21 DIAGNOSIS — B35.1 ONYCHOMYCOSIS: ICD-10-CM

## 2024-11-21 DIAGNOSIS — I73.89 OTHER SPECIFIED PERIPHERAL VASCULAR DISEASES (HCC): Primary | ICD-10-CM

## 2024-11-21 DIAGNOSIS — E11.49 DIABETIC NEUROPATHY WITH NEUROLOGIC COMPLICATION (HCC): ICD-10-CM

## 2024-11-21 DIAGNOSIS — E11.40 DIABETIC NEUROPATHY WITH NEUROLOGIC COMPLICATION (HCC): ICD-10-CM

## 2024-11-21 PROCEDURE — 1159F MED LIST DOCD IN RCRD: CPT | Performed by: PODIATRIST

## 2024-11-21 PROCEDURE — 99213 OFFICE O/P EST LOW 20 MIN: CPT | Performed by: PODIATRIST

## 2024-11-21 NOTE — PROGRESS NOTES
You Titus is a 79 year old male.   Chief Complaint   Patient presents with    Toenail Care     Nail care and foot check- No FBS taken - A1C 4.8 on 6/24         HPI:   Patient returns to the clinic he is just finished radiation therapy for throat cancer and is looking forward to getting his J-tube taken out of his stomach.  Evidently he came through very easily the therapy he was even talking and eating never affected his voice.  He presents today for evaluation of his feet diabetic footcare he has no complaints of sores ulcerations or other problems at this time.  At today's visit reviewed nurse's history as taken above, allergies medications and medical history as documented below.  All changes duly noted  Allergies: Tizanidine   Current Outpatient Medications   Medication Sig Dispense Refill    glipiZIDE ER 2.5 MG Oral Tablet 24 Hr Take 1 tablet (2.5 mg total) by mouth daily.      mupirocin 2 % External Ointment Apply a small amount to the affected nail edge twice daily after soaking and cover with a Band-Aid, 30 g 0    traMADol 50 MG Oral Tab Take 1 tablet (50 mg total) by mouth every 12 (twelve) hours as needed for Pain. 30 tablet 0    Multiple Vitamins-Minerals (PRESERVISION AREDS) Oral Tab Take by mouth daily.      AMLODIPINE 5 MG Oral Tab TAKE 1 TABLET(5 MG) BY MOUTH DAILY 90 tablet 1    predniSONE 2.5 MG Oral Tab Take 1 tablet (2.5 mg total) by mouth daily. 90 tablet 0    XARELTO 10 MG Oral Tab TAKE 1 TABLET(10 MG) BY MOUTH DAILY WITH FOOD 30 tablet 2    ALPRAZolam 0.25 MG Oral Tab Take 1 tablet (0.25 mg total) by mouth daily. 30 tablet 0    gabapentin 300 MG Oral Cap Take 1 capsule (300 mg total) by mouth in the morning and 1 capsule (300 mg total) before bedtime.      metFORMIN 500 MG Oral Tab Take 1 tablet (500 mg total) by mouth 2 (two) times daily. 180 tablet 3    ALLOPURINOL 100 MG Oral Tab TAKE 1 TABLET(100 MG) BY MOUTH DAILY 90 tablet 2    losartan 100 MG Oral Tab Take 1 tablet (100 mg total) by  mouth daily. 90 tablet 1    rosuvastatin 5 MG Oral Tab Take 1 tablet (5 mg total) by mouth daily. 90 tablet 3    ACCU-CHEK FASTCLIX LANCETS Does not apply Misc Test once daily 100 each 4    ACCU-CHEK SMARTVIEW In Vitro Strip Test once daily 150 strip 4    Probiotic Product (ALIGN) 4 MG Oral Cap Take 4 mg by mouth daily.          Past Medical History:    BACK PAIN    Back problem    Cancer (HCC)    TONGUE    DIABETES    DVT (deep venous thrombosis) (HCC)    bilateral legs- different times though    Esophageal reflux    Gastritis    H pylori negative    GOUT    Gout    High blood pressure    High cholesterol    HLA B27 (HLA B27 positive)    HYPERLIPIDEMIA    HYPERTENSION    Neuropathy    shoulders arms and into the hands    PE (physical exam), annual    PMR (polymyalgia rheumatica) (HCC)    Problems with swallowing    Pulmonary nodule    repeat CT chest  1/2016    Renal disorder    CKD STAGE 3 RECONCILLED FROM PROBLEM LIST    Thrombocytopenia (HCC)    Type II or unspecified type diabetes mellitus without mention of complication, not stated as uncontrolled    Unspecified essential hypertension    Visual impairment    READING GLASSES      Past Surgical History:   Procedure Laterality Date    Anal sphincterotomy N/A 11/2/2015    Procedure: EXAM UNDER ANESTHESIA, RECTAL;  Surgeon: Eben Ann MD;  Location: [facility]    Back surgery  03/15/2018    L3-L5 Decompression, L4-L5 Discectomy uninstrumented fusion     Back surgery  05/24/2018    C5-C7 ACDF     Colonoscopy  3/10    polyps, due 2013    Colonoscopy  3/9/19= Polyp (TA), Bx: Normal colon    Repeat PRN    Colonoscopy N/A 3/9/2019    Procedure: COLONOSCOPY, POSSIBLE BIOPSY, POSSIBLE POLYPECTOMY 68635;  Surgeon: Camden Corado MD;  Location: [facility]    Colonoscopy & polypectomy  3/8/13= Polyps (TA)    Repeat 2018    Colonoscopy,remloulou schwartz,snare  3/5/2013    Procedure: ESOPHAGOGASTRODUODENOSCOPY, COLONOSCOPY, POSSIBLE BIOPSY, POSSIBLE  POLYPECTOMY 13450,73899;  Surgeon: Camden Corado MD;  Location: [facility]    Other surgical history      pilonidal cyst    Other surgical history      hammertoes and bunionb/l     Other surgical history      C-spine fusion    Patient documented not to have experienced any of the following events  3/5/2013    Procedure: ESOPHAGOGASTRODUODENOSCOPY, COLONOSCOPY, POSSIBLE BIOPSY, POSSIBLE POLYPECTOMY 00126,94554;  Surgeon: Camden Corado MD;  Location: [facility]    Patient withough preoperative order for iv antibiotic surgical site infection prophylaxis.  3/5/2013    Procedure: ESOPHAGOGASTRODUODENOSCOPY, COLONOSCOPY, POSSIBLE BIOPSY, POSSIBLE POLYPECTOMY 43095,77374;  Surgeon: Camden Corado MD;  Location: [facility]    Tonsillectomy      Upper gi endoscopy performed  3/8/13= Gastritis    Normal gastric/duodenal Bx: Normal. No H pylori    Upper gi endoscopy,biopsy  3/5/2013    Procedure: ESOPHAGOGASTRODUODENOSCOPY, COLONOSCOPY, POSSIBLE BIOPSY, POSSIBLE POLYPECTOMY 44843,28823;  Surgeon: Camden Corado MD;  Location: [facility]      Family History   Problem Relation Age of Onset    Other (Other) Mother         scleroderma    Diabetes Neg     Cancer Neg     Heart Disorder Neg     Hypertension Neg     Lipids Neg       Social History     Socioeconomic History    Marital status:    Tobacco Use    Smoking status: Former     Current packs/day: 0.50     Average packs/day: 0.5 packs/day for 50.0 years (25.0 ttl pk-yrs)     Types: Cigars, Cigarettes    Smokeless tobacco: Never   Vaping Use    Vaping status: Every Day   Substance and Sexual Activity    Alcohol use: Yes     Alcohol/week: 7.0 standard drinks of alcohol     Types: 7 Standard drinks or equivalent per week    Drug use: No           REVIEW OF SYSTEMS:   Today reviewed systens as documented below  GENERAL HEALTH: feels well otherwise  SKIN: Refer to exam below  RESPIRATORY: denies shortness of breath  with exertion  CARDIOVASCULAR: denies chest pain on exertion  GI: denies abdominal pain and denies heartburn  NEURO: denies headaches    EXAM:   There were no vitals taken for this visit.  GENERAL: well developed, well nourished, in no apparent distress  EXTREMITIES:   1. Integument: The skin on his feet is warm and dry the surgical scar over the fourth metatarsal resection of it was taken out for partial fourth ray amputation.  He has thickening of his toenails 1-5 on on the left and 1-3 and #5 on the right.  These are all characteristic of onychomycosis.   2. Vascular: Patient has palpable pulses dorsalis pedis but I could not palpate posterior tibial there is some occlusion near   3. Neurologic: Patient has pain sensation intact but does have some loss of protective sensation.  Cannot feel the Edgerton-Jumana 10 g filament in all dermatomes.   4. 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    ASSESSMENT AND PLAN:   Diagnoses and all orders for this visit:    Other specified peripheral vascular diseases (HCC)    Diabetic neuropathy with neurologic complication (HCC)    Onychomycosis        Plan: Today using a nail nippers were trimmed and debrided toenails 1-5 manually and mechanically in girth and width as far down to healthy tissue as possible on both feet.  This was done uneventfully there was no hemorrhage.  There is mild incurvation of the medial and lateral nail borders of the hallux which using a slant back technique were removed and they would not get ingrown.   At today's visit I reminded the patient about daily foot checks  Reminded patient again of proper control of his diabetes to help prevent any severe complications in his feet  Proper foot hygiene moisturizing except between the toes was reviewed  Advised follow-up again every 2 to 3 months for routine care but emphasized to him the importance of coming in sooner if he notices any problems.    The patient indicates  understanding of these issues and agrees to the plan.    Anibal Crum, ALESSANDRAM

## 2025-01-22 ENCOUNTER — OFFICE VISIT (OUTPATIENT)
Dept: PODIATRY CLINIC | Facility: CLINIC | Age: 80
End: 2025-01-22

## 2025-01-22 VITALS — SYSTOLIC BLOOD PRESSURE: 130 MMHG | DIASTOLIC BLOOD PRESSURE: 72 MMHG

## 2025-01-22 DIAGNOSIS — E11.40 DIABETIC NEUROPATHY WITH NEUROLOGIC COMPLICATION (HCC): ICD-10-CM

## 2025-01-22 DIAGNOSIS — I73.89 OTHER SPECIFIED PERIPHERAL VASCULAR DISEASES: Primary | ICD-10-CM

## 2025-01-22 DIAGNOSIS — B35.1 ONYCHOMYCOSIS: ICD-10-CM

## 2025-01-22 DIAGNOSIS — L60.0 ONYCHOCRYPTOSIS: ICD-10-CM

## 2025-01-22 DIAGNOSIS — E11.49 DIABETIC NEUROPATHY WITH NEUROLOGIC COMPLICATION (HCC): ICD-10-CM

## 2025-01-22 DIAGNOSIS — M20.5X1 HALLUX LIMITUS, RIGHT: ICD-10-CM

## 2025-01-22 DIAGNOSIS — M77.50 CAPSULITIS OF METATARSOPHALANGEAL (MTP) JOINT: ICD-10-CM

## 2025-01-22 DIAGNOSIS — M19.079 ARTHRITIS OF GREAT TOE AT METATARSOPHALANGEAL JOINT: ICD-10-CM

## 2025-01-22 PROCEDURE — 99213 OFFICE O/P EST LOW 20 MIN: CPT | Performed by: PODIATRIST

## 2025-01-22 PROCEDURE — 1159F MED LIST DOCD IN RCRD: CPT | Performed by: PODIATRIST

## 2025-01-22 RX ORDER — TRIAMCINOLONE ACETONIDE 40 MG/ML
20 INJECTION, SUSPENSION INTRA-ARTICULAR; INTRAMUSCULAR ONCE
Status: COMPLETED | OUTPATIENT
Start: 2025-01-22 | End: 2025-01-22

## 2025-01-22 NOTE — PROGRESS NOTES
Per Dr. Crum draw up 0.5ml of 0.5% Marcaine and 0.5ml of Kenalog 40 for injection to right foot.

## 2025-01-22 NOTE — PROGRESS NOTES
You Titus is a 79 year old male.   Chief Complaint   Patient presents with    Diabetic Foot Care     Nail care and foot check- No FBS taken-A1C 4.6 on 12/16- LOV PCP on 12/19 Naldo Torre         HPI:   Patient returns to clinic for nail and footcare.  He has a known history of vasculopathy with interventions history of partial fourth ray amputation on the right foot.  There is also now complaining of pain over his great toe joint area.  He is also diabetic.  At today's visit reviewed nurse's history as taken above, allergies medications and medical history as documented below.  All changes duly noted  Allergies: Tizanidine   Current Outpatient Medications   Medication Sig Dispense Refill    glipiZIDE ER 2.5 MG Oral Tablet 24 Hr Take 1 tablet (2.5 mg total) by mouth daily.      mupirocin 2 % External Ointment Apply a small amount to the affected nail edge twice daily after soaking and cover with a Band-Aid, 30 g 0    traMADol 50 MG Oral Tab Take 1 tablet (50 mg total) by mouth every 12 (twelve) hours as needed for Pain. 30 tablet 0    Multiple Vitamins-Minerals (PRESERVISION AREDS) Oral Tab Take by mouth daily.      AMLODIPINE 5 MG Oral Tab TAKE 1 TABLET(5 MG) BY MOUTH DAILY 90 tablet 1    predniSONE 2.5 MG Oral Tab Take 1 tablet (2.5 mg total) by mouth daily. 90 tablet 0    XARELTO 10 MG Oral Tab TAKE 1 TABLET(10 MG) BY MOUTH DAILY WITH FOOD 30 tablet 2    ALPRAZolam 0.25 MG Oral Tab Take 1 tablet (0.25 mg total) by mouth daily. 30 tablet 0    gabapentin 300 MG Oral Cap Take 1 capsule (300 mg total) by mouth in the morning and 1 capsule (300 mg total) before bedtime.      metFORMIN 500 MG Oral Tab Take 1 tablet (500 mg total) by mouth 2 (two) times daily. 180 tablet 3    ALLOPURINOL 100 MG Oral Tab TAKE 1 TABLET(100 MG) BY MOUTH DAILY 90 tablet 2    losartan 100 MG Oral Tab Take 1 tablet (100 mg total) by mouth daily. 90 tablet 1    rosuvastatin 5 MG Oral Tab Take 1 tablet (5 mg total) by mouth daily. 90  tablet 3    ACCU-CHEK FASTCLIX LANCETS Does not apply Misc Test once daily 100 each 4    ACCU-CHEK SMARTVIEW In Vitro Strip Test once daily 150 strip 4    Probiotic Product (ALIGN) 4 MG Oral Cap Take 4 mg by mouth daily.          Past Medical History:    BACK PAIN    Back problem    Cancer (HCC)    TONGUE    DIABETES    DVT (deep venous thrombosis) (HCC)    bilateral legs- different times though    Esophageal reflux    Gastritis    H pylori negative    GOUT    Gout    High blood pressure    High cholesterol    HLA B27 (HLA B27 positive)    HYPERLIPIDEMIA    HYPERTENSION    Neuropathy    shoulders arms and into the hands    PE (physical exam), annual    PMR (polymyalgia rheumatica) (HCC)    Problems with swallowing    Pulmonary nodule    repeat CT chest  1/2016    Renal disorder    CKD STAGE 3 RECONCILLED FROM PROBLEM LIST    Thrombocytopenia ([facility])    Type II or unspecified type diabetes mellitus without mention of complication, not stated as uncontrolled    Unspecified essential hypertension    Visual impairment    READING GLASSES      Past Surgical History:   Procedure Laterality Date    Anal sphincterotomy N/A 11/2/2015    Procedure: EXAM UNDER ANESTHESIA, RECTAL;  Surgeon: Eben Ann MD;  Location: [facility]    Back surgery  03/15/2018    L3-L5 Decompression, L4-L5 Discectomy uninstrumented fusion     Back surgery  05/24/2018    C5-C7 ACDF     Colonoscopy  3/10    polyps, due 2013    Colonoscopy  3/9/19= Polyp (TA), Bx: Normal colon    Repeat PRN    Colonoscopy N/A 3/9/2019    Procedure: COLONOSCOPY, POSSIBLE BIOPSY, POSSIBLE POLYPECTOMY 35507;  Surgeon: Camden Corado MD;  Location: [facility]    Colonoscopy & polypectomy  3/8/13= Polyps (TA)    Repeat 2018    Colonoscopy,remv lesobey,snare  3/5/2013    Procedure: ESOPHAGOGASTRODUODENOSCOPY, COLONOSCOPY, POSSIBLE BIOPSY, POSSIBLE POLYPECTOMY 75345,73755;  Surgeon: Camden Corado MD;  Location: [facility]    Other  surgical history      pilonidal cyst    Other surgical history      hammertoes and bunionb/l     Other surgical history      C-spine fusion    Patient documented not to have experienced any of the following events  3/5/2013    Procedure: ESOPHAGOGASTRODUODENOSCOPY, COLONOSCOPY, POSSIBLE BIOPSY, POSSIBLE POLYPECTOMY 77902,52479;  Surgeon: Camden Corado MD;  Location: [facility]    Patient withough preoperative order for iv antibiotic surgical site infection prophylaxis.  3/5/2013    Procedure: ESOPHAGOGASTRODUODENOSCOPY, COLONOSCOPY, POSSIBLE BIOPSY, POSSIBLE POLYPECTOMY 84529,82748;  Surgeon: Camden Corado MD;  Location: [facility]    Tonsillectomy      Upper gi endoscopy performed  3/8/13= Gastritis    Normal gastric/duodenal Bx: Normal. No H pylori    Upper gi endoscopy,biopsy  3/5/2013    Procedure: ESOPHAGOGASTRODUODENOSCOPY, COLONOSCOPY, POSSIBLE BIOPSY, POSSIBLE POLYPECTOMY 99906,08676;  Surgeon: Camden Corado MD;  Location: [facility]      Family History   Problem Relation Age of Onset    Other (Other) Mother         scleroderma    Diabetes Neg     Cancer Neg     Heart Disorder Neg     Hypertension Neg     Lipids Neg       Social History     Socioeconomic History    Marital status:    Tobacco Use    Smoking status: Former     Current packs/day: 0.50     Average packs/day: 0.5 packs/day for 50.0 years (25.0 ttl pk-yrs)     Types: Cigars, Cigarettes    Smokeless tobacco: Never   Vaping Use    Vaping status: Every Day   Substance and Sexual Activity    Alcohol use: Yes     Alcohol/week: 7.0 standard drinks of alcohol     Types: 7 Standard drinks or equivalent per week    Drug use: No           REVIEW OF SYSTEMS:   Today reviewed systens as documented below  GENERAL HEALTH: feels well otherwise  SKIN: Refer to exam below  RESPIRATORY: denies shortness of breath with exertion  CARDIOVASCULAR: denies chest pain on exertion  GI: denies abdominal pain and denies  heartburn  NEURO: denies headaches    EXAM:   There were no vitals taken for this visit.  GENERAL: well developed, well nourished, in no apparent distress  EXTREMITIES:   1. Integument: The skin on his feet was evaluated is warm and dry is a scar on the dorsal right foot where the partial fourth ray was performed.  His toenails 1-3 and 5 on the right as well as 1-5 on the left are all thickened and dystrophic some are incurvated.  He has prominence of the first metatarsal head.  He complains of pain in that area especially in the middle of the night he does not get so much during the day but sometimes it can be very severe at night   2. Vascular: Patient has palpable posterior tibial pulse bilateral dorsalis pedis very weak and are absent on the right   3. Neurologic: Patient has diminished pedal sensorium loss of protective sensation   4. Musculoskeletal: Patient has limited first ray range of motion at the first MTP joint with hallux limitus and prominence of the first metatarsal head  X-rays were taken 3 views showing osteoarthritic changes to the first MTP joint previous bunion surgery in addition to that partial fourth ray amputation and atherosclerotic plaques are noted.    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  This was done uneventfully there was no hemorrhage.  There is mild incurvation of the medial and lateral nail borders of the hallux which using a slant back technique were removed and they would not get ingrown.  Today educated the patient about the problem with the first MTP joint has not had any treatment for this on the right foot.Today discussed the nature and extent of a cortisone injection as well as the possible complications and risks.  Patient was in agreement to receive the injection.  The patient was consented for a cortisone injection and after timeout was taken the injection consisting of 20 mg Kenalog and 5 cc of 0.5% Marcaine plain was injected into the symptomatic first MTP joint on the right foot using aseptic technique and appropriate approach.  A Band-Aid was applied to the injection site.   At today's visit I reminded the patient about daily foot checks  Reminded patient again of proper control of his diabetes to help prevent any severe complications in his feet  Proper foot hygiene moisturizing except between the toes was reviewed  Advised follow-up again every 2 to 3 months for routine care but emphasized to him the importance of coming in sooner if he notices any problems.    The patient indicates understanding of these issues and agrees to the plan.    Anibal Crum DPM

## 2025-02-17 ENCOUNTER — TELEPHONE (OUTPATIENT)
Dept: PODIATRY CLINIC | Facility: CLINIC | Age: 80
End: 2025-02-17

## 2025-02-17 RX ORDER — MUPIROCIN 20 MG/G
OINTMENT TOPICAL
Qty: 30 G | Refills: 0 | Status: SHIPPED | OUTPATIENT
Start: 2025-02-17

## 2025-02-17 NOTE — TELEPHONE ENCOUNTER
LOV 1/22/25  Called patient and he states last night he woke up due to pain in his right hallux on the lateral border and base of the nail. He denies any redness, swelling, or drainage. Patient states pain feels like an \"electric pulse\". Patient does have a hx of gout and takes allopurinol but has been off his meds the past 2 days due to having eye surgery today to repair a macular hole. He states he has to keep his head down for 24/7 until he sees his ophthalmologist on 2/18 but may have restrictions like that for the entire week as he is not clear yet on how long so he is unable to come in for an appointment. Patient soaked his foot last night with warm water and epsom salt but did not note any improvement. He took tylenol and Tramadol for pain about 45 minutes ago but hasn't noted any pain relief yet. Patient inquiring if there is anything to take for the pain. I did advise that patient needs an evaluation and would recommend speaking with ophthalmologist to see when he can be seen in office. Patient will also send pictures in [facility]. In the meantime he will continue soaking foot with warm water and epsom salt twice daily.

## 2025-02-17 NOTE — TELEPHONE ENCOUNTER
S/w Dr Crum in clinic- He is aware that patient just had eye surgery and would like patient to be very careful when he comes in for follow up. Dr Crum recommends continuing to soak the toes. He states that would like Rx of Mupirocin put in for patient. He states that we can get patient in as overbook once it is safe for him to come in for appointment.    Order placed for Mupirocin 2% ointment, #30g, 0 refills per Dr Crum's verbal instructions    Called patient- Informed him of the Rx. He states that he will soak BID and use the mupirocin ointment. I told him to call us once he sees his retinologist and we can work and getting him in for an appointment. He was agreeable to plan.

## 2025-02-17 NOTE — TELEPHONE ENCOUNTER
Patient was able to reach office and needs a nurse to call about a temporary pain med for possible ingrown but had eye sx this morning and cannot go to a clinic or ED for several days. Would a nurse be able to call?

## 2025-03-25 ENCOUNTER — OFFICE VISIT (OUTPATIENT)
Dept: PODIATRY CLINIC | Facility: CLINIC | Age: 80
End: 2025-03-25

## 2025-03-25 DIAGNOSIS — B35.1 ONYCHOMYCOSIS: ICD-10-CM

## 2025-03-25 DIAGNOSIS — L60.0 ONYCHOCRYPTOSIS: ICD-10-CM

## 2025-03-25 DIAGNOSIS — E11.49 DIABETIC NEUROPATHY WITH NEUROLOGIC COMPLICATION (HCC): ICD-10-CM

## 2025-03-25 DIAGNOSIS — I73.89 OTHER SPECIFIED PERIPHERAL VASCULAR DISEASES: Primary | ICD-10-CM

## 2025-03-25 DIAGNOSIS — B35.1 OM (ONYCHOMYCOSIS): ICD-10-CM

## 2025-03-25 DIAGNOSIS — E11.40 DIABETIC NEUROPATHY WITH NEUROLOGIC COMPLICATION (HCC): ICD-10-CM

## 2025-03-25 DIAGNOSIS — M19.079 ARTHRITIS OF GREAT TOE AT METATARSOPHALANGEAL JOINT: ICD-10-CM

## 2025-03-25 DIAGNOSIS — M20.5X1 HALLUX LIMITUS, RIGHT: ICD-10-CM

## 2025-03-25 DIAGNOSIS — L60.0 OC (ONYCHOCRYPTOSIS): ICD-10-CM

## 2025-03-25 DIAGNOSIS — N18.30 STAGE 3 CHRONIC KIDNEY DISEASE, UNSPECIFIED WHETHER STAGE 3A OR 3B CKD (HCC): ICD-10-CM

## 2025-03-25 PROCEDURE — 99213 OFFICE O/P EST LOW 20 MIN: CPT | Performed by: PODIATRIST

## 2025-03-25 PROCEDURE — 1159F MED LIST DOCD IN RCRD: CPT | Performed by: PODIATRIST

## 2025-03-25 NOTE — PROGRESS NOTES
You Titus is a 79 year old male.   Chief Complaint   Patient presents with    Diabetic Foot Care     Nail trim and foot check f/u- last A1c=4.7 on 3/17/2025- was also LOV w/ PCP Dr. Torre- FBS this am was not taken         HPI:   Patient has recently had a retinal surgery has developed a secondary cataract which she will be getting lasered off soon.  He also states that his left knee where he had a replacement is doing well but his right knee is painful he is going to get a PRP injection in that soon.  He presents today for routine nail care and evaluation of his feet he is not having any specific complaints other than a little throbbing over the right great toe joint where he has osteoarthritis.  At today's visit reviewed nurse's history as taken above, allergies medications and medical history as documented below.  All changes duly noted  Allergies: Tizanidine   Current Outpatient Medications   Medication Sig Dispense Refill    mupirocin 2 % External Ointment Apply a small amount to the affected nail edge twice daily after soaking and cover with a Band-Aid, 30 g 0    glipiZIDE ER 2.5 MG Oral Tablet 24 Hr Take 1 tablet (2.5 mg total) by mouth daily.      traMADol 50 MG Oral Tab Take 1 tablet (50 mg total) by mouth every 12 (twelve) hours as needed for Pain. 30 tablet 0    Multiple Vitamins-Minerals (PRESERVISION AREDS) Oral Tab Take by mouth daily.      AMLODIPINE 5 MG Oral Tab TAKE 1 TABLET(5 MG) BY MOUTH DAILY 90 tablet 1    predniSONE 2.5 MG Oral Tab Take 1 tablet (2.5 mg total) by mouth daily. 90 tablet 0    XARELTO 10 MG Oral Tab TAKE 1 TABLET(10 MG) BY MOUTH DAILY WITH FOOD 30 tablet 2    ALPRAZolam 0.25 MG Oral Tab Take 1 tablet (0.25 mg total) by mouth daily. 30 tablet 0    gabapentin 300 MG Oral Cap Take 1 capsule (300 mg total) by mouth in the morning and 1 capsule (300 mg total) before bedtime.      metFORMIN 500 MG Oral Tab Take 1 tablet (500 mg total) by mouth 2 (two) times daily. 180 tablet 3     ALLOPURINOL 100 MG Oral Tab TAKE 1 TABLET(100 MG) BY MOUTH DAILY 90 tablet 2    losartan 100 MG Oral Tab Take 1 tablet (100 mg total) by mouth daily. 90 tablet 1    rosuvastatin 5 MG Oral Tab Take 1 tablet (5 mg total) by mouth daily. 90 tablet 3    ACCU-CHEK FASTCLIX LANCETS Does not apply Misc Test once daily 100 each 4    ACCU-CHEK SMARTVIEW In Vitro Strip Test once daily 150 strip 4    Probiotic Product (ALIGN) 4 MG Oral Cap Take 4 mg by mouth daily.          Past Medical History:    BACK PAIN    Back problem    Cancer (HCC)    TONGUE    DIABETES    DVT (deep venous thrombosis) (HCC)    bilateral legs- different times though    Esophageal reflux    Gastritis    H pylori negative    GOUT    Gout    High blood pressure    High cholesterol    HLA B27 (HLA B27 positive)    HYPERLIPIDEMIA    HYPERTENSION    Neuropathy    shoulders arms and into the hands    PE (physical exam), annual    PMR (polymyalgia rheumatica) (HCC)    Problems with swallowing    Pulmonary nodule    repeat CT chest  1/2016    Renal disorder    CKD STAGE 3 RECONCILLED FROM PROBLEM LIST    Thrombocytopenia    Type II or unspecified type diabetes mellitus without mention of complication, not stated as uncontrolled    Unspecified essential hypertension    Visual impairment    READING GLASSES      Past Surgical History:   Procedure Laterality Date    Anal sphincterotomy N/A 11/2/2015    Procedure: EXAM UNDER ANESTHESIA, RECTAL;  Surgeon: Ebne Ann MD;  Location: [facility]    Back surgery  03/15/2018    L3-L5 Decompression, L4-L5 Discectomy uninstrumented fusion     Back surgery  05/24/2018    C5-C7 ACDF     Colonoscopy  3/10    polyps, due 2013    Colonoscopy  3/9/19= Polyp (TA), Bx: Normal colon    Repeat PRN    Colonoscopy N/A 3/9/2019    Procedure: COLONOSCOPY, POSSIBLE BIOPSY, POSSIBLE POLYPECTOMY 40337;  Surgeon: Camden Corado MD;  Location: [facility]    Colonoscopy & polypectomy  3/8/13= Polyps (TA)     Repeat 2018    Colonoscopy,remv lesn,snare  3/5/2013    Procedure: ESOPHAGOGASTRODUODENOSCOPY, COLONOSCOPY, POSSIBLE BIOPSY, POSSIBLE POLYPECTOMY 54007,54494;  Surgeon: Camden Corado MD;  Location: [facility]    Other surgical history      pilonidal cyst    Other surgical history      hammertoes and bunionb/l     Other surgical history      C-spine fusion    Patient documented not to have experienced any of the following events  3/5/2013    Procedure: ESOPHAGOGASTRODUODENOSCOPY, COLONOSCOPY, POSSIBLE BIOPSY, POSSIBLE POLYPECTOMY 36885,14548;  Surgeon: Camden Corado MD;  Location: [facility]    Patient withough preoperative order for iv antibiotic surgical site infection prophylaxis.  3/5/2013    Procedure: ESOPHAGOGASTRODUODENOSCOPY, COLONOSCOPY, POSSIBLE BIOPSY, POSSIBLE POLYPECTOMY 75788,72048;  Surgeon: Camden Corado MD;  Location: [facility]    Tonsillectomy      Upper gi endoscopy performed  3/8/13= Gastritis    Normal gastric/duodenal Bx: Normal. No H pylori    Upper gi endoscopy,biopsy  3/5/2013    Procedure: ESOPHAGOGASTRODUODENOSCOPY, COLONOSCOPY, POSSIBLE BIOPSY, POSSIBLE POLYPECTOMY 96651,50911;  Surgeon: Camden Corado MD;  Location: [facility]      Family History   Problem Relation Age of Onset    Other (Other) Mother         scleroderma    Diabetes Neg     Cancer Neg     Heart Disorder Neg     Hypertension Neg     Lipids Neg       Social History     Socioeconomic History    Marital status:    Tobacco Use    Smoking status: Former     Current packs/day: 0.50     Average packs/day: 0.5 packs/day for 50.0 years (25.0 ttl pk-yrs)     Types: Cigars, Cigarettes    Smokeless tobacco: Never   Vaping Use    Vaping status: Every Day   Substance and Sexual Activity    Alcohol use: Yes     Alcohol/week: 7.0 standard drinks of alcohol     Types: 7 Standard drinks or equivalent per week    Drug use: No           REVIEW OF SYSTEMS:   Today reviewed  systens as documented below  GENERAL HEALTH: feels well otherwise  SKIN: Refer to exam below  RESPIRATORY: denies shortness of breath with exertion  CARDIOVASCULAR: denies chest pain on exertion  GI: denies abdominal pain and denies heartburn  NEURO: denies headaches    EXAM:   There were no vitals taken for this visit.  GENERAL: well developed, well nourished, in no apparent distress  EXTREMITIES:   1. Integument: The skin on his feet was evaluated is warm and dry is a scar on the dorsal right foot where the partial fourth ray was performed.  His toenails 1-3 and 5 on the right as well as 1-5 on the left are all thickened and dystrophic some are incurvated.  He has prominence of the first metatarsal head.  He complains of pain in that area especially in the middle of the night he does not get so much during the day but sometimes it can be very severe at night   2. Vascular: Patient has palpable posterior tibial pulse bilateral dorsalis pedis very weak and are absent on the right   3. Neurologic: Patient has diminished pedal sensorium loss of protective sensation   4. 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  This was done uneventfully there was no hemorrhage.  There is mild  incurvation of the medial and lateral nail borders of the hallux which using a slant back technique were removed and they would not get ingrown.  Today educated the patient about the problem with the first MTP joint has not had any treatment for this on the right foot.Today discussed the nature and extent of a cortisone injection as well as the possible complications and risks.  Patient was in agreement to receive the injection.  The patient was consented for a cortisone injection and after timeout was taken the injection consisting of 20 mg Kenalog and 5 cc of 0.5% Marcaine plain was injected into the symptomatic first MTP joint on the right foot using aseptic technique and appropriate approach.  A Band-Aid was applied to the injection site.   At today's visit I reminded the patient about daily foot checks  Reminded patient again of proper control of his diabetes to help prevent any severe complications in his feet  Proper foot hygiene moisturizing except between the toes was reviewed  Advised follow-up again every 2 to 3 months for routine care but emphasized to him the importance of coming in sooner if he notices any problems.    The patient indicates understanding of these issues and agrees to the plan.    Anibal Crum DPM

## 2025-05-23 ENCOUNTER — EKG ENCOUNTER (OUTPATIENT)
Dept: LAB | Age: 80
End: 2025-05-23
Payer: MEDICARE

## 2025-05-23 ENCOUNTER — LABORATORY ENCOUNTER (OUTPATIENT)
Dept: LAB | Age: 80
End: 2025-05-23
Payer: MEDICARE

## 2025-05-23 DIAGNOSIS — Z01.818 PRE-OP TESTING: ICD-10-CM

## 2025-05-23 DIAGNOSIS — D69.6 THROMBOCYTOPENIA: ICD-10-CM

## 2025-05-23 LAB
ALBUMIN SERPL-MCNC: 4.2 G/DL (ref 3.2–4.8)
ALBUMIN/GLOB SERPL: 2 {RATIO} (ref 1–2)
ALP LIVER SERPL-CCNC: 100 U/L (ref 45–117)
ALT SERPL-CCNC: 8 U/L (ref 10–49)
ANION GAP SERPL CALC-SCNC: 7 MMOL/L (ref 0–18)
AST SERPL-CCNC: 14 U/L (ref ?–34)
BASOPHILS # BLD AUTO: 0.02 X10(3) UL (ref 0–0.2)
BASOPHILS NFR BLD AUTO: 0.4 %
BILIRUB SERPL-MCNC: 1 MG/DL (ref 0.2–1.1)
BUN BLD-MCNC: 16 MG/DL (ref 9–23)
CALCIUM BLD-MCNC: 9.2 MG/DL (ref 8.7–10.6)
CHLORIDE SERPL-SCNC: 106 MMOL/L (ref 98–112)
CO2 SERPL-SCNC: 28 MMOL/L (ref 21–32)
CREAT BLD-MCNC: 1.23 MG/DL (ref 0.7–1.3)
EGFRCR SERPLBLD CKD-EPI 2021: 60 ML/MIN/1.73M2 (ref 60–?)
EOSINOPHIL # BLD AUTO: 0.01 X10(3) UL (ref 0–0.7)
EOSINOPHIL NFR BLD AUTO: 0.2 %
ERYTHROCYTE [DISTWIDTH] IN BLOOD BY AUTOMATED COUNT: 13.6 %
GLOBULIN PLAS-MCNC: 2.1 G/DL (ref 2–3.5)
GLUCOSE BLD-MCNC: 118 MG/DL (ref 70–99)
HCT VFR BLD AUTO: 36.8 % (ref 39–53)
HGB BLD-MCNC: 12.8 G/DL (ref 13–17.5)
IMM GRANULOCYTES # BLD AUTO: 0.05 X10(3) UL (ref 0–1)
IMM GRANULOCYTES NFR BLD: 0.9 %
LYMPHOCYTES # BLD AUTO: 0.79 X10(3) UL (ref 1–4)
LYMPHOCYTES NFR BLD AUTO: 14.8 %
MCH RBC QN AUTO: 33.1 PG (ref 26–34)
MCHC RBC AUTO-ENTMCNC: 34.8 G/DL (ref 31–37)
MCV RBC AUTO: 95.1 FL (ref 80–100)
MONOCYTES # BLD AUTO: 0.53 X10(3) UL (ref 0.1–1)
MONOCYTES NFR BLD AUTO: 10 %
NEUTROPHILS # BLD AUTO: 3.92 X10 (3) UL (ref 1.5–7.7)
NEUTROPHILS # BLD AUTO: 3.92 X10(3) UL (ref 1.5–7.7)
NEUTROPHILS NFR BLD AUTO: 73.7 %
OSMOLALITY SERPL CALC.SUM OF ELEC: 294 MOSM/KG (ref 275–295)
PLATELET # BLD AUTO: 131 10(3)UL (ref 150–450)
PLATELETS.RETICULATED NFR BLD AUTO: 3.3 % (ref 0–7)
POTASSIUM SERPL-SCNC: 4.2 MMOL/L (ref 3.5–5.1)
PROT SERPL-MCNC: 6.3 G/DL (ref 5.7–8.2)
RBC # BLD AUTO: 3.87 X10(6)UL (ref 3.8–5.8)
SODIUM SERPL-SCNC: 141 MMOL/L (ref 136–145)
WBC # BLD AUTO: 5.3 X10(3) UL (ref 4–11)

## 2025-05-23 PROCEDURE — 36415 COLL VENOUS BLD VENIPUNCTURE: CPT

## 2025-05-23 PROCEDURE — 93005 ELECTROCARDIOGRAM TRACING: CPT

## 2025-05-23 PROCEDURE — 80053 COMPREHEN METABOLIC PANEL: CPT

## 2025-05-23 PROCEDURE — 85025 COMPLETE CBC W/AUTO DIFF WBC: CPT

## 2025-05-23 PROCEDURE — 93010 ELECTROCARDIOGRAM REPORT: CPT | Performed by: INTERNAL MEDICINE

## 2025-05-24 LAB
ATRIAL RATE: 91 BPM
P-R INTERVAL: 180 MS
Q-T INTERVAL: 346 MS
QRS DURATION: 74 MS
QTC CALCULATION (BEZET): 425 MS
R AXIS: -1 DEGREES
T AXIS: 49 DEGREES
VENTRICULAR RATE: 91 BPM

## 2025-05-27 ENCOUNTER — OFFICE VISIT (OUTPATIENT)
Dept: PODIATRY CLINIC | Facility: CLINIC | Age: 80
End: 2025-05-27

## 2025-05-27 DIAGNOSIS — L60.0 ONYCHOCRYPTOSIS: ICD-10-CM

## 2025-05-27 DIAGNOSIS — B35.1 ONYCHOMYCOSIS: ICD-10-CM

## 2025-05-27 DIAGNOSIS — N18.30 STAGE 3 CHRONIC KIDNEY DISEASE, UNSPECIFIED WHETHER STAGE 3A OR 3B CKD (HCC): Primary | ICD-10-CM

## 2025-05-27 DIAGNOSIS — E11.49 DIABETIC NEUROPATHY WITH NEUROLOGIC COMPLICATION (HCC): ICD-10-CM

## 2025-05-27 DIAGNOSIS — E11.40 DIABETIC NEUROPATHY WITH NEUROLOGIC COMPLICATION (HCC): ICD-10-CM

## 2025-05-27 DIAGNOSIS — M20.5X1 HALLUX LIMITUS, RIGHT: ICD-10-CM

## 2025-05-27 PROCEDURE — 99213 OFFICE O/P EST LOW 20 MIN: CPT | Performed by: PODIATRIST

## 2025-05-27 PROCEDURE — 1159F MED LIST DOCD IN RCRD: CPT | Performed by: PODIATRIST

## 2025-05-27 PROCEDURE — 1125F AMNT PAIN NOTED PAIN PRSNT: CPT | Performed by: PODIATRIST

## 2025-05-27 NOTE — H&P
Preprocedure Note    I have reviewed the progress note by Dr. Johnson, dated 5/22/25. There has been no interval change.    You Titus is a 79 year old former smoker with a history of DVT/PE on xarelto, DM, hypertension, hyperlipidemia, polymyalgia rheumatica, and squamous cell carcinoma of the base of tongue, who is normally followed by Dr. Johnson (ENT) and Dr. Grover (Oncology) with [facility]. The patient was noted on recent PET scan (5/15/25) to have hypermetabolic mediastinal and hilar lymph nodes. He was referred to me for bronchoscopic biopsy.    Informed consent was obtained for bronchoscopy with EBUS-TBNA biopsy under general anesthesia.      Martínez Ken M.D.  Pulmonary/Critical Care

## 2025-05-27 NOTE — PROGRESS NOTES
You Titus is a 79 year old male.   Chief Complaint   Patient presents with    Toenail Care     Pt is here for toe nail care and nail trimming, pt states has pain in the right foot 3rd toe         HPI:   Patient returns to the clinic complaining his routine footcare he cannot trim his own toenails he has a history of PAD with amputation of the fourth toe on his right foot for osteomyelitis.  He states that his throat cancer has cleared up but now he has nodules on his lungs on his most recent PET scan and he will be going in for a biopsy tomorrow.  At today's visit reviewed nurse's history as taken above, allergies medications and medical history as documented below.  All changes duly noted  Allergies: Tizanidine   Current Outpatient Medications   Medication Sig Dispense Refill    mupirocin 2 % External Ointment Apply a small amount to the affected nail edge twice daily after soaking and cover with a Band-Aid, 30 g 0    glipiZIDE ER 2.5 MG Oral Tablet 24 Hr Take 1 tablet (2.5 mg total) by mouth in the morning.      traMADol 50 MG Oral Tab Take 1 tablet (50 mg total) by mouth every 12 (twelve) hours as needed for Pain. 30 tablet 0    Multiple Vitamins-Minerals (PRESERVISION AREDS) Oral Tab Take by mouth 2 (two) times daily.      AMLODIPINE 5 MG Oral Tab TAKE 1 TABLET(5 MG) BY MOUTH DAILY 90 tablet 1    predniSONE 2.5 MG Oral Tab Take 1 tablet (2.5 mg total) by mouth daily. 90 tablet 0    XARELTO 10 MG Oral Tab TAKE 1 TABLET(10 MG) BY MOUTH DAILY WITH FOOD 30 tablet 2    ALPRAZolam 0.25 MG Oral Tab Take 1 tablet (0.25 mg total) by mouth daily. 30 tablet 0    gabapentin 300 MG Oral Cap Take 1 capsule (300 mg total) by mouth nightly.      metFORMIN 500 MG Oral Tab Take 1 tablet (500 mg total) by mouth 2 (two) times daily. 180 tablet 3    ALLOPURINOL 100 MG Oral Tab TAKE 1 TABLET(100 MG) BY MOUTH DAILY 90 tablet 2    losartan 100 MG Oral Tab Take 1 tablet (100 mg total) by mouth daily. 90 tablet 1    rosuvastatin  5 MG Oral Tab Take 1 tablet (5 mg total) by mouth daily. 90 tablet 3    Probiotic Product (ALIGN) 4 MG Oral Cap Take 4 mg by mouth daily.        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 DIAGNOSED    DIABETES    DVT (deep venous thrombosis) (HCC)    bilateral legs- different times though    Esophageal reflux    Exposure to medical diagnostic radiation    2024    Gastritis    H pylori negative    GOUT    Gout    High blood pressure    High cholesterol    HLA B27 (HLA B27 positive)    HYPERLIPIDEMIA    HYPERTENSION    Lung nodules    Neuropathy    shoulders arms and into the hands    PE (physical exam), annual    Personal history of antineoplastic chemotherapy    11/2024    PMR (polymyalgia rheumatica) (HCC)    Problems with swallowing    Pulmonary nodule    repeat CT chest  1/2016    Renal disorder    CKD STAGE 3 RECONCILLED FROM PROBLEM LIST    Thrombocytopenia    Type II or unspecified type diabetes mellitus without mention of complication, not stated as uncontrolled    Unspecified essential hypertension    Visual impairment    READING GLASSES/WET AMD      Past Surgical History:   Procedure Laterality Date    Anal sphincterotomy N/A 11/02/2015    Procedure: EXAM UNDER ANESTHESIA, RECTAL;  Surgeon: Eben Ann MD;  Location: [facility]    Back surgery  03/15/2018    L3-L5 Decompression, L4-L5 Discectomy uninstrumented fusion     Back surgery  05/24/2018    C5-C7 ACDF     Colonoscopy  03/2010    polyps, due 2013    Colonoscopy  3/9/19= Polyp (TA), Bx: Normal colon    Repeat PRN    Colonoscopy N/A 03/09/2019    Procedure: COLONOSCOPY, POSSIBLE BIOPSY, POSSIBLE POLYPECTOMY 18308;  Surgeon: Camden Corado MD;  Location: [facility]    Colonoscopy &  polypectomy  3/8/13= Polyps (TA)    Repeat 2018    Colonoscopy,remv lesn,snare  03/05/2013    Procedure: ESOPHAGOGASTRODUODENOSCOPY, COLONOSCOPY, POSSIBLE BIOPSY, POSSIBLE POLYPECTOMY 46640,29446;  Surgeon: Camden Corado MD;  Location: [facility]    Other surgical history      pilonidal cyst    Other surgical history      hammertoes and bunionb/l     Other surgical history      C-spine fusion    Other surgical history      2022? RIGHT FOOT A  REMOVAL OF TOE    Other surgical history      PORT A CATH    Patient documented not to have experienced any of the following events  03/05/2013    Procedure: ESOPHAGOGASTRODUODENOSCOPY, COLONOSCOPY, POSSIBLE BIOPSY, POSSIBLE POLYPECTOMY 10439,35834;  Surgeon: Camden Corado MD;  Location: [facility]    Patient withough preoperative order for iv antibiotic surgical site infection prophylaxis.  03/05/2013    Procedure: ESOPHAGOGASTRODUODENOSCOPY, COLONOSCOPY, POSSIBLE BIOPSY, POSSIBLE POLYPECTOMY 83084,74746;  Surgeon: Camden Corado MD;  Location: [facility]    Tonsillectomy      Total knee replacement      LEFT KNEE 2024    Upper gi endoscopy performed  3/8/13= Gastritis    Normal gastric/duodenal Bx: Normal. No H pylori    Upper gi endoscopy,biopsy  03/05/2013    Procedure: ESOPHAGOGASTRODUODENOSCOPY, COLONOSCOPY, POSSIBLE BIOPSY, POSSIBLE POLYPECTOMY 48017,02038;  Surgeon: Camden Corado MD;  Location: [facility]      Family History   Problem Relation Age of Onset    Other (Other) Mother         scleroderma    Diabetes Neg     Cancer Neg     Heart Disorder Neg     Hypertension Neg     Lipids Neg       Social History     Socioeconomic History    Marital status:    Tobacco Use    Smoking status: Former     Current packs/day: 0.50     Average packs/day: 0.5 packs/day for 50.0 years (25.0 ttl pk-yrs)     Types: Cigars, Cigarettes    Smokeless tobacco: Never   Vaping Use    Vaping status: Former   Substance and  Sexual Activity    Alcohol use: Yes     Alcohol/week: 7.0 standard drinks of alcohol     Types: 7 Standard drinks or equivalent per week     Comment: COCKTAIL X1 EVERY NIGHT    Drug use: No           REVIEW OF SYSTEMS:   Today reviewed systens as documented below  GENERAL HEALTH: feels well otherwise  SKIN: Refer to exam below  RESPIRATORY: denies shortness of breath with exertion  CARDIOVASCULAR: denies chest pain on exertion  GI: denies abdominal pain and denies heartburn  NEURO: denies headaches    EXAM:   There were no vitals taken for this visit.  GENERAL: well developed, well nourished, in no apparent distress  EXTREMITIES:   1. Integument: The skin on his feet was evaluated is warm and dry is a scar on the dorsal right foot where the partial fourth ray was performed.  His toenails 1-3 and 5 on the right as well as 1-5 on the left are all thickened and dystrophic some are incurvated.  He has prominence of the first metatarsal head.  He complains of pain in that area especially in the middle of the night he does not get so much during the day but sometimes it can be very severe at night   2. Vascular: Patient has palpable posterior tibial pulse bilateral dorsalis pedis very weak and are absent on the right   3. Neurologic: Patient has diminished pedal sensorium loss of protective sensation   4. Musculoskeletal: Patient has limited first ray range of motion at the first MTP joint with hallux limitus and prominence of the first metatarsal head, right foot      ASSESSMENT AND PLAN:   Diagnoses and all orders for this visit:    Stage 3 chronic kidney disease, unspecified whether stage 3a or 3b CKD (HCC)    Diabetic neuropathy with neurologic complication (HCC)    Onychocryptosis    Onychomycosis    Hallux limitus, right        Plan: Today using a nail nippers were trimmed and debrided toenails 1-5 left and 1-3, 5 on the right manually and mechanically in girth and width as far down to healthy tissue as possible on  both feet.  This was done uneventfully there was no hemorrhage.  There is mild incurvation of the medial and lateral nail borders of the hallux which using a slant back technique were removed and they would not get ingrown.  Today educated the patient about the problem with the first MTP joint has not had any treatment for this on the right foot.Today discussed the nature and extent of a cortisone injection as well as the possible complications and risks.  Patient was in agreement to receive the injection.  The patient was consented for a cortisone injection and after timeout was taken the injection consisting of 20 mg Kenalog and 5 cc of 0.5% Marcaine plain was injected into the symptomatic first MTP joint on the right foot using aseptic technique and appropriate approach.  A Band-Aid was applied to the injection site.   At today's visit I reminded the patient about daily foot checks  Reminded patient again of proper control of his diabetes to help prevent any severe complications in his feet  Proper foot hygiene moisturizing except between the toes was reviewed  Advised follow-up again every 2 to 3 months for routine care but emphasized to him the importance of coming in sooner if he notices any problems.    The patient indicates understanding of these issues and agrees to the plan.    Anibal Crum DPM

## 2025-05-28 ENCOUNTER — ANESTHESIA (OUTPATIENT)
Dept: ENDOSCOPY | Facility: HOSPITAL | Age: 80
End: 2025-05-28
Payer: MEDICARE

## 2025-05-28 ENCOUNTER — HOSPITAL ENCOUNTER (OUTPATIENT)
Facility: HOSPITAL | Age: 80
Setting detail: HOSPITAL OUTPATIENT SURGERY
Discharge: HOME | End: 2025-05-28
Attending: INTERNAL MEDICINE | Admitting: INTERNAL MEDICINE
Payer: MEDICARE

## 2025-05-28 ENCOUNTER — ANESTHESIA EVENT (OUTPATIENT)
Dept: ENDOSCOPY | Facility: HOSPITAL | Age: 80
End: 2025-05-28
Payer: MEDICARE

## 2025-05-28 VITALS
TEMPERATURE: 97 F | HEART RATE: 79 BPM | DIASTOLIC BLOOD PRESSURE: 65 MMHG | HEIGHT: 70 IN | SYSTOLIC BLOOD PRESSURE: 139 MMHG | BODY MASS INDEX: 24.34 KG/M2 | OXYGEN SATURATION: 100 % | WEIGHT: 170 LBS | RESPIRATION RATE: 13 BRPM

## 2025-05-28 DIAGNOSIS — D69.6 THROMBOCYTOPENIA: ICD-10-CM

## 2025-05-28 DIAGNOSIS — Z01.818 PRE-OP TESTING: Primary | ICD-10-CM

## 2025-05-28 LAB — GLUCOSE BLD-MCNC: 102 MG/DL (ref 70–99)

## 2025-05-28 PROCEDURE — 88173 CYTOPATH EVAL FNA REPORT: CPT | Performed by: INTERNAL MEDICINE

## 2025-05-28 PROCEDURE — 88365 INSITU HYBRIDIZATION (FISH): CPT | Performed by: INTERNAL MEDICINE

## 2025-05-28 PROCEDURE — 88305 TISSUE EXAM BY PATHOLOGIST: CPT | Performed by: INTERNAL MEDICINE

## 2025-05-28 PROCEDURE — 82962 GLUCOSE BLOOD TEST: CPT

## 2025-05-28 PROCEDURE — 88341 IMHCHEM/IMCYTCHM EA ADD ANTB: CPT | Performed by: INTERNAL MEDICINE

## 2025-05-28 PROCEDURE — 88342 IMHCHEM/IMCYTCHM 1ST ANTB: CPT | Performed by: INTERNAL MEDICINE

## 2025-05-28 RX ORDER — ONDANSETRON 2 MG/ML
4 INJECTION INTRAMUSCULAR; INTRAVENOUS EVERY 6 HOURS PRN
Status: DISCONTINUED | OUTPATIENT
Start: 2025-05-28 | End: 2025-05-28 | Stop reason: HOSPADM

## 2025-05-28 RX ORDER — PHENYLEPHRINE HCL 10 MG/ML
VIAL (ML) INJECTION AS NEEDED
Status: DISCONTINUED | OUTPATIENT
Start: 2025-05-28 | End: 2025-05-28 | Stop reason: SURG

## 2025-05-28 RX ORDER — HYDROCODONE BITARTRATE AND ACETAMINOPHEN 5; 325 MG/1; MG/1
2 TABLET ORAL ONCE AS NEEDED
Status: DISCONTINUED | OUTPATIENT
Start: 2025-05-28 | End: 2025-05-28 | Stop reason: HOSPADM

## 2025-05-28 RX ORDER — DEXAMETHASONE SODIUM PHOSPHATE 4 MG/ML
VIAL (ML) INJECTION AS NEEDED
Status: DISCONTINUED | OUTPATIENT
Start: 2025-05-28 | End: 2025-05-28 | Stop reason: SURG

## 2025-05-28 RX ORDER — ONDANSETRON 2 MG/ML
INJECTION INTRAMUSCULAR; INTRAVENOUS AS NEEDED
Status: DISCONTINUED | OUTPATIENT
Start: 2025-05-28 | End: 2025-05-28 | Stop reason: SURG

## 2025-05-28 RX ORDER — MIDAZOLAM HYDROCHLORIDE 1 MG/ML
1 INJECTION INTRAMUSCULAR; INTRAVENOUS EVERY 5 MIN PRN
Status: DISCONTINUED | OUTPATIENT
Start: 2025-05-28 | End: 2025-05-28 | Stop reason: HOSPADM

## 2025-05-28 RX ORDER — ACETAMINOPHEN 500 MG
1000 TABLET ORAL ONCE AS NEEDED
Status: DISCONTINUED | OUTPATIENT
Start: 2025-05-28 | End: 2025-05-28 | Stop reason: HOSPADM

## 2025-05-28 RX ORDER — NEOSTIGMINE METHYLSULFATE 1 MG/ML
INJECTION INTRAVENOUS AS NEEDED
Status: DISCONTINUED | OUTPATIENT
Start: 2025-05-28 | End: 2025-05-28 | Stop reason: SURG

## 2025-05-28 RX ORDER — DEXTROSE MONOHYDRATE 25 G/50ML
50 INJECTION, SOLUTION INTRAVENOUS
Status: DISCONTINUED | OUTPATIENT
Start: 2025-05-28 | End: 2025-05-28 | Stop reason: HOSPADM

## 2025-05-28 RX ORDER — NICOTINE POLACRILEX 4 MG
30 LOZENGE BUCCAL
Status: DISCONTINUED | OUTPATIENT
Start: 2025-05-28 | End: 2025-05-28 | Stop reason: HOSPADM

## 2025-05-28 RX ORDER — SODIUM CHLORIDE, SODIUM LACTATE, POTASSIUM CHLORIDE, CALCIUM CHLORIDE 600; 310; 30; 20 MG/100ML; MG/100ML; MG/100ML; MG/100ML
INJECTION, SOLUTION INTRAVENOUS CONTINUOUS
Status: DISCONTINUED | OUTPATIENT
Start: 2025-05-28 | End: 2025-05-28

## 2025-05-28 RX ORDER — NALOXONE HYDROCHLORIDE 0.4 MG/ML
80 INJECTION, SOLUTION INTRAMUSCULAR; INTRAVENOUS; SUBCUTANEOUS AS NEEDED
Status: DISCONTINUED | OUTPATIENT
Start: 2025-05-28 | End: 2025-05-28 | Stop reason: HOSPADM

## 2025-05-28 RX ORDER — HYDROMORPHONE HYDROCHLORIDE 1 MG/ML
0.6 INJECTION, SOLUTION INTRAMUSCULAR; INTRAVENOUS; SUBCUTANEOUS EVERY 5 MIN PRN
Status: DISCONTINUED | OUTPATIENT
Start: 2025-05-28 | End: 2025-05-28 | Stop reason: HOSPADM

## 2025-05-28 RX ORDER — HYDROMORPHONE HYDROCHLORIDE 1 MG/ML
0.2 INJECTION, SOLUTION INTRAMUSCULAR; INTRAVENOUS; SUBCUTANEOUS EVERY 5 MIN PRN
Status: DISCONTINUED | OUTPATIENT
Start: 2025-05-28 | End: 2025-05-28 | Stop reason: HOSPADM

## 2025-05-28 RX ORDER — NICOTINE POLACRILEX 4 MG
15 LOZENGE BUCCAL
Status: DISCONTINUED | OUTPATIENT
Start: 2025-05-28 | End: 2025-05-28 | Stop reason: HOSPADM

## 2025-05-28 RX ORDER — METOCLOPRAMIDE HYDROCHLORIDE 5 MG/ML
5 INJECTION INTRAMUSCULAR; INTRAVENOUS EVERY 8 HOURS PRN
Status: DISCONTINUED | OUTPATIENT
Start: 2025-05-28 | End: 2025-05-28 | Stop reason: HOSPADM

## 2025-05-28 RX ORDER — HYDROMORPHONE HYDROCHLORIDE 1 MG/ML
0.4 INJECTION, SOLUTION INTRAMUSCULAR; INTRAVENOUS; SUBCUTANEOUS EVERY 5 MIN PRN
Status: DISCONTINUED | OUTPATIENT
Start: 2025-05-28 | End: 2025-05-28 | Stop reason: HOSPADM

## 2025-05-28 RX ORDER — HYDROCODONE BITARTRATE AND ACETAMINOPHEN 5; 325 MG/1; MG/1
1 TABLET ORAL ONCE AS NEEDED
Status: DISCONTINUED | OUTPATIENT
Start: 2025-05-28 | End: 2025-05-28 | Stop reason: HOSPADM

## 2025-05-28 RX ORDER — ALBUTEROL SULFATE 0.83 MG/ML
2.5 SOLUTION RESPIRATORY (INHALATION) AS NEEDED
Status: DISCONTINUED | OUTPATIENT
Start: 2025-05-28 | End: 2025-05-28 | Stop reason: HOSPADM

## 2025-05-28 RX ORDER — GLYCOPYRROLATE 0.2 MG/ML
INJECTION, SOLUTION INTRAMUSCULAR; INTRAVENOUS AS NEEDED
Status: DISCONTINUED | OUTPATIENT
Start: 2025-05-28 | End: 2025-05-28 | Stop reason: SURG

## 2025-05-28 RX ORDER — LIDOCAINE HYDROCHLORIDE 10 MG/ML
INJECTION, SOLUTION EPIDURAL; INFILTRATION; INTRACAUDAL; PERINEURAL AS NEEDED
Status: DISCONTINUED | OUTPATIENT
Start: 2025-05-28 | End: 2025-05-28 | Stop reason: SURG

## 2025-05-28 RX ORDER — SODIUM CHLORIDE, SODIUM LACTATE, POTASSIUM CHLORIDE, CALCIUM CHLORIDE 600; 310; 30; 20 MG/100ML; MG/100ML; MG/100ML; MG/100ML
INJECTION, SOLUTION INTRAVENOUS CONTINUOUS
Status: DISCONTINUED | OUTPATIENT
Start: 2025-05-28 | End: 2025-05-28 | Stop reason: HOSPADM

## 2025-05-28 RX ORDER — ROCURONIUM BROMIDE 10 MG/ML
INJECTION, SOLUTION INTRAVENOUS AS NEEDED
Status: DISCONTINUED | OUTPATIENT
Start: 2025-05-28 | End: 2025-05-28 | Stop reason: SURG

## 2025-05-28 RX ORDER — LABETALOL HYDROCHLORIDE 5 MG/ML
5 INJECTION, SOLUTION INTRAVENOUS EVERY 5 MIN PRN
Status: DISCONTINUED | OUTPATIENT
Start: 2025-05-28 | End: 2025-05-28 | Stop reason: HOSPADM

## 2025-05-28 RX ADMIN — GLYCOPYRROLATE 0.8 MG: 0.2 INJECTION, SOLUTION INTRAMUSCULAR; INTRAVENOUS at 10:05:00

## 2025-05-28 RX ADMIN — ROCURONIUM BROMIDE 10 MG: 10 INJECTION, SOLUTION INTRAVENOUS at 09:28:00

## 2025-05-28 RX ADMIN — ROCURONIUM BROMIDE 10 MG: 10 INJECTION, SOLUTION INTRAVENOUS at 09:35:00

## 2025-05-28 RX ADMIN — DEXAMETHASONE SODIUM PHOSPHATE 4 MG: 4 MG/ML VIAL (ML) INJECTION at 09:28:00

## 2025-05-28 RX ADMIN — NEOSTIGMINE METHYLSULFATE 5 MG: 1 INJECTION INTRAVENOUS at 10:05:00

## 2025-05-28 RX ADMIN — PHENYLEPHRINE HCL 100 MCG: 10 MG/ML VIAL (ML) INJECTION at 09:28:00

## 2025-05-28 RX ADMIN — SODIUM CHLORIDE, SODIUM LACTATE, POTASSIUM CHLORIDE, CALCIUM CHLORIDE: 600; 310; 30; 20 INJECTION, SOLUTION INTRAVENOUS at 09:26:00

## 2025-05-28 RX ADMIN — LIDOCAINE HYDROCHLORIDE 50 MG: 10 INJECTION, SOLUTION EPIDURAL; INFILTRATION; INTRACAUDAL; PERINEURAL at 09:28:00

## 2025-05-28 RX ADMIN — ONDANSETRON 4 MG: 2 INJECTION INTRAMUSCULAR; INTRAVENOUS at 09:35:00

## 2025-05-28 NOTE — OPERATIVE REPORT
Patient Name: You Titus    MRN: [id number]    : 7/15/1945      EBUS Bronchoscopy Procedure [facility]     Preop diagnosis:          Abnormal CT / PET scan, hilar adenopathy    Postop diagnosis:         Abnormal CT / PET scan, hilar adenopathy    Procedure(s) performed  Standard bronchoscopy  EBUS-TBNA biopsy, station 11Rs  EBUS-TBNA biopsy, station 11L     Sedation used:      General Anesthesia     Description of procedure:    Informed consent was obtained. Patient was brought to the bronchoscopy lab. The patient was induced under general anesthesia and intubated by the anesthesiologist.  A standard bronchoscope was inserted via the endotracheal tube.   The trachea appeared normal. The main cesar appeared normal. There were no purulent secretions noted. The right lung was first inspected. The right mainstem appeared normal. The right upper lobe bronchus appeared normal. The right middle lobe and right lower lobe bronchi were inspected and appeared normal. The left lung was then inspected. The left mainstem appeared normal. The left upper lobe appeared normal. The lingula appeared normal. The left lower lobe bronchi appeared normal.  After airway inspection was completed, the standard bronchoscope was removed.     A convex probe endobronchial ultrasound (EBUS) bronchoscope was inserted via the endotracheal tube.  Bilateral lymph node stations were systematically surveyed.  Enlarged lymph nodes were noted at station 11Rs (just distal to the takeoff of the right upper lobe) and 11L.  A dedicated 22-gauge EBUS-TBNA needle was used to sample both the 11Rs and 11L locations. Multiple passes were taken from each location for slide preparation and cell block preparation by the cytopathology technician. Rapid onsite evaluation by the pathologist revealed no obvious malignancy.     After all biopsies were performed, the airways were cleared of bloody debris. There was adequate hemostasis. The  bronchoscope was withdrawn. The care of the patient was transferred to the anesthesiologist for reversal of anesthesia and extubation. The patient tolerated the procedure well and was transferred to the recovery area. Blood loss was minimal.    Samples obtained:                     EBUS-TBNA biopsy, station 11Rs node  EBUS-TBNA biopsy, station 11L node    Assessment:    Normal airway inspection on inspection bronchoscopy  EBUS-TBNA biopsy of station 11Rs and station 11L node     Plan:  Follow up cytology results         Martínez Ken M.D.  Pulmonary/Critical Care

## 2025-05-28 NOTE — ANESTHESIA PREPROCEDURE EVALUATION
PRE-OP EVALUATION    Patient Name: You Titus    Admit Diagnosis: ABNORMAL PET SCAN    Pre-op Diagnosis: ABNORMAL PET SCAN    ENDOBRONCHIAL ULTRASOUND (EBUS) WITH TRANSBRONCHIAL NEEDLE BIOPSY    Anesthesia Procedure: ENDOBRONCHIAL ULTRASOUND (EBUS) WITH TRANSBRONCHIAL NEEDLE BIOPSY  BRONCHOSCOPY    Surgeons and Role:     * Martínez Ken MD - Primary    Pre-op vitals reviewed.  Temp: 98.9 °F (37.2 °C)  Pulse: 94  Resp: 20  BP: 133/83  SpO2: 100 %  Body mass index is 24.39 kg/m².    Current medications reviewed.  Hospital Medications:  Current Medications[1]    Outpatient Medications:   Prescriptions Prior to Admission[2]    Allergies: Tizanidine      Anesthesia Evaluation    Patient summary reviewed.    Anesthetic Complications  (-) history of anesthetic complications         GI/Hepatic/Renal      (+) GERD       (+) chronic renal disease and CRI                   Cardiovascular        Exercise tolerance: good     MET: >4      (+) hypertension                                     Endo/Other      (+) diabetes  type 2,                          Pulmonary    Negative pulmonary ROS.                       Neuro/Psych        (+) anxiety                              Past Surgical History[3]  Social Hx on file[4]  History   Drug Use No     Available pre-op labs reviewed.  Lab Results   Component Value Date    WBC 5.3 05/23/2025    RBC 3.87 05/23/2025    HGB 12.8 (L) 05/23/2025    HCT 36.8 (L) 05/23/2025    MCV 95.1 05/23/2025    MCH 33.1 05/23/2025    MCHC 34.8 05/23/2025    RDW 13.6 05/23/2025    .0 (L) 05/23/2025     Lab Results   Component Value Date     05/23/2025    K 4.2 05/23/2025     05/23/2025    CO2 28.0 05/23/2025    BUN 16 05/23/2025    CREATSERUM 1.23 05/23/2025     (H) 05/23/2025    CA 9.2 05/23/2025            Airway      Mallampati: III  Mouth opening: >3 FB  TM distance: > 6 cm  Neck ROM: full Cardiovascular    Cardiovascular exam normal.  Rhythm: regular  Rate: normal      Dental    Dentition appears grossly intact         Pulmonary    Pulmonary exam normal.  Breath sounds clear to auscultation bilaterally.               Other findings              ASA: 3   Plan: general  NPO status verified and patient meets guidelines.  Patient has not taken beta blockers in last 24 hours.  Post-procedure pain management plan discussed with surgeon and patient.    Comment: I spoke with the patient and discussed the risks of general anesthesia, which include nausea and vomiting; sore throat; injury to the lips, gums, teeth, and eyes; cardiac, pulmonary, and neurologic events; aspiration; and allergic reactions. The patient understands these risks and consents to receiving general anesthesia for this procedure.  Plan/risks discussed with: patient                Present on Admission:  **None**             [1]    glucose (Dex4) 15 GM/59ML oral liquid 15 g  15 g Oral Q15 Min PRN    Or    glucose (Glutose) 40% oral gel 15 g  15 g Oral Q15 Min PRN    Or    glucose-vitamin C (Dex-4) chewable tab 4 tablet  4 tablet Oral Q15 Min PRN    Or    dextrose 50% injection 50 mL  50 mL Intravenous Q15 Min PRN    Or    glucose (Dex4) 15 GM/59ML oral liquid 30 g  30 g Oral Q15 Min PRN    Or    glucose (Glutose) 40% oral gel 30 g  30 g Oral Q15 Min PRN    Or    glucose-vitamin C (Dex-4) chewable tab 8 tablet  8 tablet Oral Q15 Min PRN    lactated ringers infusion   Intravenous Continuous   [2]   Medications Prior to Admission   Medication Sig Dispense Refill Last Dose/Taking    glipiZIDE ER 2.5 MG Oral Tablet 24 Hr Take 1 tablet (2.5 mg total) by mouth in the morning.   Past Week    traMADol 50 MG Oral Tab Take 1 tablet (50 mg total) by mouth every 12 (twelve) hours as needed for Pain. 30 tablet 0 5/27/2025    Multiple Vitamins-Minerals (PRESERVISION AREDS) Oral Tab Take by mouth 2 (two) times daily.   Past Week    AMLODIPINE 5 MG Oral Tab TAKE 1 TABLET(5 MG) BY MOUTH DAILY 90 tablet 1 Past Week    XARELTO 10 MG Oral  Tab TAKE 1 TABLET(10 MG) BY MOUTH DAILY WITH FOOD 30 tablet 2 5/25/2025    ALPRAZolam 0.25 MG Oral Tab Take 1 tablet (0.25 mg total) by mouth daily. 30 tablet 0 Past Week    gabapentin 300 MG Oral Cap Take 1 capsule (300 mg total) by mouth nightly.   5/27/2025    metFORMIN 500 MG Oral Tab Take 1 tablet (500 mg total) by mouth 2 (two) times daily. 180 tablet 3 5/27/2025    ALLOPURINOL 100 MG Oral Tab TAKE 1 TABLET(100 MG) BY MOUTH DAILY 90 tablet 2 Past Week    losartan 100 MG Oral Tab Take 1 tablet (100 mg total) by mouth daily. 90 tablet 1 Past Week    rosuvastatin 5 MG Oral Tab Take 1 tablet (5 mg total) by mouth daily. 90 tablet 3 Taking    Probiotic Product (ALIGN) 4 MG Oral Cap Take 4 mg by mouth in the morning.   Past Week    mupirocin 2 % External Ointment Apply a small amount to the affected nail edge twice daily after soaking and cover with a Band-Aid, 30 g 0 Unknown    predniSONE 2.5 MG Oral Tab Take 1 tablet (2.5 mg total) by mouth daily. 90 tablet 0     ACCU-CHEK FASTCLIX LANCETS Does not apply Misc Test once daily 100 each 4     ACCU-CHEK SMARTVIEW In Vitro Strip Test once daily 150 strip 4    [3]   Past Surgical History:  Procedure Laterality Date    Anal sphincterotomy N/A 11/02/2015    Procedure: EXAM UNDER ANESTHESIA, RECTAL;  Surgeon: Eben Ann MD;  Location: [facility]    Back surgery  03/15/2018    L3-L5 Decompression, L4-L5 Discectomy uninstrumented fusion     Back surgery  05/24/2018    C5-C7 ACDF     Colonoscopy  03/2010    polyps, due 2013    Colonoscopy  3/9/19= Polyp (TA), Bx: Normal colon    Repeat PRN    Colonoscopy N/A 03/09/2019    Procedure: COLONOSCOPY, POSSIBLE BIOPSY, POSSIBLE POLYPECTOMY 68057;  Surgeon: Camden Corado MD;  Location: [facility]    Colonoscopy & polypectomy  3/8/13= Polyps (TA)    Repeat 2018    Colonoscopy,issac schwartz,snare  03/05/2013    Procedure: ESOPHAGOGASTRODUODENOSCOPY, COLONOSCOPY, POSSIBLE BIOPSY, POSSIBLE POLYPECTOMY  37030,01373;  Surgeon: Camden Corado MD;  Location: [facility]    Other surgical history      pilonidal cyst    Other surgical history      hammertoes and bunionb/l     Other surgical history      C-spine fusion    Other surgical history      2022? RIGHT FOOT A  REMOVAL OF TOE    Other surgical history      PORT A CATH    Patient documented not to have experienced any of the following events  03/05/2013    Procedure: ESOPHAGOGASTRODUODENOSCOPY, COLONOSCOPY, POSSIBLE BIOPSY, POSSIBLE POLYPECTOMY 52266,33183;  Surgeon: Camden Corado MD;  Location: [facility]    Patient withough preoperative order for iv antibiotic surgical site infection prophylaxis.  03/05/2013    Procedure: ESOPHAGOGASTRODUODENOSCOPY, COLONOSCOPY, POSSIBLE BIOPSY, POSSIBLE POLYPECTOMY 68211,84710;  Surgeon: Camden Corado MD;  Location: [facility]    Tonsillectomy      Total knee replacement      LEFT KNEE 2024    Upper gi endoscopy performed  3/8/13= Gastritis    Normal gastric/duodenal Bx: Normal. No H pylori    Upper gi endoscopy,biopsy  03/05/2013    Procedure: ESOPHAGOGASTRODUODENOSCOPY, COLONOSCOPY, POSSIBLE BIOPSY, POSSIBLE POLYPECTOMY 40453,01025;  Surgeon: Camden Corado MD;  Location: [facility]   [4]   Social History  Socioeconomic History    Marital status:    Tobacco Use    Smoking status: Former     Current packs/day: 0.50     Average packs/day: 0.5 packs/day for 50.0 years (25.0 ttl pk-yrs)     Types: Cigars, Cigarettes    Smokeless tobacco: Never   Vaping Use    Vaping status: Former   Substance and Sexual Activity    Alcohol use: Yes     Alcohol/week: 7.0 standard drinks of alcohol     Types: 7 Standard drinks or equivalent per week     Comment: COCKTAIL X1 EVERY NIGHT    Drug use: No

## 2025-05-28 NOTE — DISCHARGE INSTRUCTIONS
Outpatient Instructions Following Bronchoscopy    1. Due to after effect of the medication given during your bronchoscopy, we would advise that for 12 hours after the procedure you:   - not return to work   - not drive a car or other vehicle   - not operate machinery (including kitchen equipment)   - not drink alcohol    2. Prior to your examination, a local anesthetic was used to numb the back of your throat. Do not eat for one to two hours. Sip fluids initially and advance to your regular diet as tolerated.    3. Tenderness or swelling occasionally occur at the site where medication was injected. Should this occur, it is helpful to apply heat to the area, and notify your doctor.    4. Contact your doctor if you experience the following:   - difficulty breathing   - fever greater than 101   - large amount of blood in sputum (small amount of blood in sputum expected after biopsy)    5. If a biopsy or cytology was performed, you will be notified should further investigation be recommended. Your referring physician will receive a full report of your examination and will contact you.    6. DOCTOR'S COMMENTS (if applicable):

## 2025-05-28 NOTE — ANESTHESIA POSTPROCEDURE EVALUATION
[facility]    You Titus Patient Status:  Hospital Outpatient Surgery   Age/Gender 79 year old male MRN [id number]   Location [facility] POST ANESTHESIA CARE UNIT Attending Martínez Ken MD   Hosp Day # 0 PCP Naldo Torre MD       Anesthesia Post-op Note    ENDOBRONCHIAL ULTRASOUND (EBUS) WITH TRANSBRONCHIAL NEEDLE BIOPSY    Procedure Summary       Date: 05/28/25 Room / Location:  ENDOSCOPY 04 /  ENDOSCOPY    Anesthesia Start: 0926 Anesthesia Stop: 1028    Procedures:       ENDOBRONCHIAL ULTRASOUND (EBUS) WITH TRANSBRONCHIAL NEEDLE BIOPSY      BRONCHOSCOPY Diagnosis: (ABNORMAL PET SCAN)    Surgeons: Martínez Ken MD Anesthesiologist: Neil Hernandez MD    Anesthesia Type: general ASA Status: 3            Anesthesia Type: general    Vitals Value Taken Time   /70 05/28/25 10:29   Temp 97.1 °F (36.2 °C) 05/28/25 10:29   Pulse 69 05/28/25 10:29   Resp 20 05/28/25 10:29   SpO2 99 % 05/28/25 10:29   Vitals shown include unfiled device data.        Patient Location: PACU    Anesthesia Type: general    Airway Patency: patent and extubated    Postop Pain Control: adequate    Mental Status: mildly sedated but able to meaningfully participate in the post-anesthesia evaluation    Nausea/Vomiting: none    Cardiopulmonary/Hydration status: stable euvolemic    Complications: no apparent anesthesia related complications    Postop vital signs: stable    Dental Exam: Unchanged from Preop    Patient to be discharged from PACU when criteria met.

## 2025-05-28 NOTE — ANESTHESIA PROCEDURE NOTES
Airway  Date/Time: 5/28/2025 9:30 AM  Reason: elective      General Information and Staff   Patient location during procedure: OR  Anesthesiologist: Neil Hernandez MD  Performed: anesthesiologist   Performed by: Neil Hernandez MD  Authorized by: Neil Hernandez MD        Indications and Patient Condition  Indications for airway management: anesthesia  Sedation level: deep      Preoxygenated: yesPatient position: sniffing    Mask difficulty assessment: 1 - vent by mask    Final Airway Details    Final airway type: endotracheal airway    Successful airway: ETT  Cuffed: yes   Successful intubation technique: Video laryngoscopy  Facilitating devices/methods: intubating stylet  Endotracheal tube insertion site: oral  Blade: GlideScope  Blade size: #4  ETT size (mm): 8.5    Cormack-Lehane Classification: grade I - full view of glottis  Placement verified by: capnometry   Measured from: lips  ETT to lips (cm): 24  Number of attempts at approach: 1  Number of other approaches attempted: 0

## 2025-05-30 ENCOUNTER — TELEPHONE (OUTPATIENT)
Dept: PODIATRY CLINIC | Facility: CLINIC | Age: 80
End: 2025-05-30

## 2025-05-30 NOTE — TELEPHONE ENCOUNTER
Patient received nail care for diabetic foot care and has a bad red toe after now and asking for nurse to call as Dr Crum out of office on Fridays.

## 2025-05-30 NOTE — TELEPHONE ENCOUNTER
Spoke with patient. Had nail care this week and he was awoken at 3 am with the toe throbbing and the lower right corner of the tip of toe is red. He denies, purulent discharge, warmth. He is doing Epsom soaks and mupirocin ointment. Advised to keep doing what he is doing and to monitor over the weekend. Advised if gets worse, went over signs/symptoms of infection to go to urgent care.advised if no improvement to reach out on Monday. Verbalized understanding.. Agreeable to plan.

## 2025-06-29 ENCOUNTER — WALK IN (OUTPATIENT)
Dept: URGENT CARE | Age: 80
End: 2025-06-29
Attending: EMERGENCY MEDICINE

## 2025-06-29 VITALS
OXYGEN SATURATION: 99 % | TEMPERATURE: 97.8 F | SYSTOLIC BLOOD PRESSURE: 137 MMHG | RESPIRATION RATE: 16 BRPM | HEART RATE: 82 BPM | DIASTOLIC BLOOD PRESSURE: 75 MMHG

## 2025-06-29 DIAGNOSIS — K12.1 STOMATITIS AND MUCOSITIS: Primary | ICD-10-CM

## 2025-06-29 DIAGNOSIS — K12.30 STOMATITIS AND MUCOSITIS: Primary | ICD-10-CM

## 2025-07-29 ENCOUNTER — OFFICE VISIT (OUTPATIENT)
Dept: PODIATRY CLINIC | Facility: CLINIC | Age: 80
End: 2025-07-29

## 2025-07-29 DIAGNOSIS — E11.49 DIABETIC NEUROPATHY WITH NEUROLOGIC COMPLICATION (HCC): ICD-10-CM

## 2025-07-29 DIAGNOSIS — B35.1 OM (ONYCHOMYCOSIS): ICD-10-CM

## 2025-07-29 DIAGNOSIS — I73.89 OTHER SPECIFIED PERIPHERAL VASCULAR DISEASES: ICD-10-CM

## 2025-07-29 DIAGNOSIS — E11.40 DIABETIC NEUROPATHY WITH NEUROLOGIC COMPLICATION (HCC): ICD-10-CM

## 2025-07-29 DIAGNOSIS — N18.30 STAGE 3 CHRONIC KIDNEY DISEASE, UNSPECIFIED WHETHER STAGE 3A OR 3B CKD (HCC): Primary | ICD-10-CM

## 2025-07-29 DIAGNOSIS — B35.1 ONYCHOMYCOSIS: ICD-10-CM

## 2025-07-29 DIAGNOSIS — L60.0 OC (ONYCHOCRYPTOSIS): ICD-10-CM

## 2025-07-29 DIAGNOSIS — L60.0 ONYCHOCRYPTOSIS: ICD-10-CM

## 2025-07-29 DIAGNOSIS — M20.5X1 HALLUX LIMITUS, RIGHT: ICD-10-CM

## 2025-07-29 PROCEDURE — 1159F MED LIST DOCD IN RCRD: CPT | Performed by: PODIATRIST

## 2025-07-29 PROCEDURE — 99213 OFFICE O/P EST LOW 20 MIN: CPT | Performed by: PODIATRIST

## 2025-08-16 ENCOUNTER — WALK IN (OUTPATIENT)
Dept: URGENT CARE | Age: 80
End: 2025-08-16
Attending: STUDENT IN AN ORGANIZED HEALTH CARE EDUCATION/TRAINING PROGRAM

## 2025-08-16 ENCOUNTER — HOSPITAL ENCOUNTER (EMERGENCY)
Age: 80
Discharge: STILL ADMITTED OR ADMINISTRATIVE | End: 2025-08-17

## 2025-08-16 VITALS
SYSTOLIC BLOOD PRESSURE: 102 MMHG | TEMPERATURE: 97.6 F | RESPIRATION RATE: 18 BRPM | HEART RATE: 86 BPM | OXYGEN SATURATION: 98 % | DIASTOLIC BLOOD PRESSURE: 59 MMHG

## 2025-08-16 DIAGNOSIS — R42 DIZZINESS: Primary | ICD-10-CM

## 2025-08-16 LAB
ATRIAL RATE (BPM): 88
PR-INTERVAL (MSEC): 184
QRS-INTERVAL (MSEC): 82
QT-INTERVAL (MSEC): 350
QTC: 423
R AXIS (DEGREES): 10
REPORT TEXT: NORMAL
T AXIS (DEGREES): 17
VENTRICULAR RATE EKG/MIN (BPM): 88

## 2025-08-16 PROCEDURE — 10003627 HB COUNTER ED NO SERVICE

## 2025-08-16 PROCEDURE — 93005 ELECTROCARDIOGRAM TRACING: CPT | Performed by: STUDENT IN AN ORGANIZED HEALTH CARE EDUCATION/TRAINING PROGRAM

## 2025-08-16 ASSESSMENT — PAIN SCALES - GENERAL
PAINLEVEL_OUTOF10: 0
PAINLEVEL_OUTOF10: 0

## 2025-08-28 ENCOUNTER — HOSPITAL ENCOUNTER (EMERGENCY)
Age: 80
Discharge: HOME | End: 2025-08-28
Attending: EMERGENCY MEDICINE

## 2025-08-28 ASSESSMENT — PAIN SCALES - GENERAL: PAINLEVEL_OUTOF10: 0

## (undated) DEVICE — SUTURE VICRYL 2-0 FSL

## (undated) DEVICE — SOL  .9 1000ML BTL

## (undated) DEVICE — LIGHT HANDLE

## (undated) DEVICE — SINGLE USE BIOPSY VALVE MAJ-210: Brand: SINGLE USE BIOPSY VALVE (STERILE)

## (undated) DEVICE — 3.0MM PRECISION NEURO (MATCH HEAD)

## (undated) DEVICE — 10FT COMBINED O2 DELIVERY/CO2 MONITORING. FILTER WITH MICROSTREAM TYPE LUER: Brand: DUAL ADULT NASAL CANNULA

## (undated) DEVICE — MAXCESS C MODULE

## (undated) DEVICE — STERILE SYNTHETIC POLYISOPRENE POWDER-FREE SURGICAL GLOVES WITH HYDROGEL COATING, SMOOTH FINISH, STRAIGHT FINGER: Brand: PROTEXIS

## (undated) DEVICE — KIT CUSTOM ENDOPROCEDURE STERIS

## (undated) DEVICE — SLEEVE KENDALL SCD EXPRESS MED

## (undated) DEVICE — MEGADYNE ELECTRODE ADULT PT RT

## (undated) DEVICE — UNIVERSAL STERIBUMP® STERILE (5/CASE): Brand: UNIVERSAL STERIBUMP®

## (undated) DEVICE — KIT MFLD FOR SPEC COLL

## (undated) DEVICE — DRILL SRG OIL CRTDG MAESTRO

## (undated) DEVICE — AGENT HMST STRL KT MTRX THRMB

## (undated) DEVICE — GLOVE SUR 7.5 SENSICARE PIP WHT PWD F

## (undated) DEVICE — 450 ML BOTTLE OF 0.05% CHLORHEXIDINE GLUCONATE IN 99.95% STERILE WATER FOR IRRIGATION, USP AND APPLICATOR.: Brand: IRRISEPT ANTIMICROBIAL WOUND LAVAGE

## (undated) DEVICE — BOWL MED MD 16OZ PLAS CAP GRAD

## (undated) DEVICE — BITEBLOCK ENDOSCP 60FR MAXI STRP

## (undated) DEVICE — LAMINECTOMY CDS: Brand: MEDLINE INDUSTRIES, INC.

## (undated) DEVICE — STANDARD HYPODERMIC NEEDLE,POLYPROPYLENE HUB: Brand: MONOJECT

## (undated) DEVICE — GLOVE SURG TRIUMPH SZ 7

## (undated) DEVICE — PAD SACRAL SPAN AID

## (undated) DEVICE — GOWN,SIRUS,FABRIC-REINFORCED,LARGE: Brand: MEDLINE

## (undated) DEVICE — Device

## (undated) DEVICE — 3M™ MICROFOAM™ TAPE 1528-4: Brand: 3M™ MICROFOAM™

## (undated) DEVICE — SINGLE USE SUCTION VALVE MAJ-209: Brand: SINGLE USE SUCTION VALVE (STERILE)

## (undated) DEVICE — 4-WAY HIGH FLOW STOPCOCK W/ROTATING LUER: Brand: ICU MEDICAL

## (undated) DEVICE — 1200CC GUARDIAN II: Brand: GUARDIAN

## (undated) DEVICE — C-ARM: Brand: UNBRANDED

## (undated) DEVICE — TRANSPOSAL ULTRAFLEX DUO/QUAD ULTRA CART MANIFOLD

## (undated) DEVICE — BIT DRL 10MM

## (undated) DEVICE — SOLUTION  .9 1000ML BTL

## (undated) DEVICE — Device: Brand: BALLOON

## (undated) DEVICE — GLOVE,SURG,SENSICARE,ALOE,LF,PF,7: Brand: MEDLINE

## (undated) DEVICE — SUT PROLENE 3-0 PS-1 8663G

## (undated) DEVICE — ADAPTER BRONCHSCP 15MM FBROPT SWVL 2 AXIS

## (undated) DEVICE — SPLINT PRECUT ORTHOGLASS 4X15

## (undated) DEVICE — OCCLUSIVE GAUZE STRIP OVERWRAP,3% BISMUTH TRIBROMOPHENATE IN PETROLATUM BLEND: Brand: XEROFORM

## (undated) DEVICE — PIN DSTRCT 14MM

## (undated) DEVICE — UNDYED BRAIDED (POLYGLACTIN 910), SYNTHETIC ABSORBABLE SUTURE: Brand: COATED VICRYL

## (undated) DEVICE — GLOVE SURG TRIUMPH SZ 71/2

## (undated) DEVICE — SYRINGE MED 10ML SLIP TIP CLR BRL TAPR PLUNG

## (undated) DEVICE — 3M™ RED DOT™ MONITORING ELECTRODE WITH FOAM TAPE AND STICKY GEL, 50/BAG, 20/CASE, 72/PLT 2570: Brand: RED DOT™

## (undated) DEVICE — LAPAROTOMY SPONGE - RF AND X-RAY DETECTABLE PRE-WASHED: Brand: SITUATE

## (undated) DEVICE — MAJ-1414 SINGLE USE ADPATER BIOPSY VALV: Brand: SINGLE USE ADAPTOR BIOPSY VALVE

## (undated) DEVICE — IMPAD RIGID SOLE FOOT COVER, MEDIUM: Brand: A-V IMPULSE

## (undated) DEVICE — NVM5 NEEDLE MODULE M/E

## (undated) DEVICE — MEDI-VAC NON-CONDUCTIVE SUCTION TUBING: Brand: CARDINAL HEALTH

## (undated) DEVICE — #15 STERILE STAINLESS BLADE: Brand: STERILE STAINLESS BLADES

## (undated) DEVICE — PCKNG STRP IODOFORM 1/4INX5YD

## (undated) DEVICE — MINI-BLADE®: Brand: BEAVER®

## (undated) DEVICE — SUTURE SILK 2-0 FSL

## (undated) DEVICE — 60 ML SYRINGE REGULAR TIP: Brand: MONOJECT

## (undated) DEVICE — BANDAGE ROLL,100% COTTON, 6 PLY, LARGE: Brand: KERLIX

## (undated) DEVICE — CAUTERY BLADE 2IN INS E1455

## (undated) DEVICE — NEEDLE ASPIR 22GA X 700MM SYR VLV ECHOGENIC

## (undated) DEVICE — KENDALL SCD EXPRESS SLEEVES, THIGH LENGTH, MEDIUM: Brand: KENDALL SCD

## (undated) DEVICE — BLADE SAW KM33-11

## (undated) DEVICE — FLOSEAL SEALENT STERILE 10ML

## (undated) DEVICE — WRAP COOLING BACK W/NO PILLOW

## (undated) DEVICE — ABDOMINAL PAD: Brand: DERMACEA

## (undated) DEVICE — LOWER EXTREMITY CDS-LF: Brand: MEDLINE INDUSTRIES, INC.

## (undated) DEVICE — MEDI-VAC SUCTION HANDLE REGULAR CAPACITY: Brand: CARDINAL HEALTH

## (undated) DEVICE — KIT VLV 5 PC AIR H2O SUCT BX ENDOGATOR CONN

## (undated) DEVICE — SUTURE VICRYL 1 OS-6

## (undated) DEVICE — DRAIN RELIAVAC W/DRN MED 1/8

## (undated) DEVICE — STRL PENROSE DRAIN 18" X 1/4": Brand: CARDINAL HEALTH

## (undated) DEVICE — DRAPE TABLE COVER 44X90 TC-10

## (undated) NOTE — LETTER
Consent to Procedure/Sedation    Date: 5/24/2018    Time: 4737    1. I authorize the performance upon Redmond Andreycalvin the following:    Inferior Vena Cava Filter Insertion    2.  I authorize Dr. Keily Norwood whomever is designated as the doctor’s assistant), t Witness: ____________________     Date: ______________    Printed: 2018   5:15 PM    Patient Name: Betsey Vigil        : 7/15/1945       Medical Record #: [id number]

## (undated) NOTE — LETTER
AUTHORIZATION FOR SURGICAL OPERATION OR OTHER PROCEDURE    1. I hereby authorize Dr. Anibal Crum, and [facility] staff assigned to my case to perform the following operation and/or procedure at the [facility] site:    _______________________________________________________________________________________________    Cortisone Injection Right Foot  _______________________________________________________________________________________________    2.  My physician has explained the nature and purpose of the operation or other procedure, possible alternative methods of treatment, the risks involved, and the possibility of complication to me.  I acknowledge that no guarantee has been made as to the result that may be obtained.  3.  I recognize that, during the course of this operation, or other procedure, unforseen conditions may necessitate additional or different procedure than those listed above.  I, therefore, further authorize and request that the above named physician, his/her physician assistants or designees perform such procedures as are, in his/her professional opinion, necessary and desirable.  4.  Any tissue or organs removed in the operation or other procedure may be disposed of by and at the discretion of the [facility] and [facility].  5.  I understand that in the event of a medical emergency, I will be transported by local paramedics to [facility] or other hospital emergency department.  6.  I certify that I have read and fully understand the above consent to operation and/or other procedure.    7.  I acknowledge that my physician has explained sedation/analgesia administration to me including the risks and benefits.  I consent to the administration of sedation/analgesia as may be necessary or desirable in the judgement of my physician.    Witness signature: ___________________________________________________ Date:   ______/______/_____                    Time:  ________ A.M.  P.M.       Patient Name:  ______________________________________________________  (please print)      Patient signature:  ___________________________________________________             Relationship to Patient:           []  Parent    Responsible person                          []  Spouse  In case of minor or                    [] Other  _____________   Incompetent name:  __________________________________________________                               (please print)      _____________      Responsible person  In case of minor or  Incompetent signature:  _______________________________________________    Statement of Physician  My signature below affirms that prior to the time of the procedure, I have explained to the patient and/or his/her guardian, the risks and benefits involved in the proposed treatment and any reasonable alternative to the proposed treatment.  I have also explained the risks and benefits involved in the refusal of the proposed treatment and have answered the patient's questions.                        Date:  ______/______/_______  Provider                      Signature:  __________________________________________________________       Time:  ___________ A.M    P.M.

## (undated) NOTE — LETTER
Consent to Procedure/Sedation    Date: __07/19/2018_    Time: _1016___    1. I authorize the performance upon Nyla Olivas the following:  IVC filter removal    2.  I authorize Dr. Kimmie Severino (and whomever is designated as the doctor’s assistant), to perform the a Witness: ____________________     Date: ______________    Printed: 2018   10:16 AM    Patient Name: Kaylene Guillen        : 7/15/1945       Medical Record #: [id number]